# Patient Record
Sex: MALE | Race: WHITE | NOT HISPANIC OR LATINO | Employment: OTHER | URBAN - METROPOLITAN AREA
[De-identification: names, ages, dates, MRNs, and addresses within clinical notes are randomized per-mention and may not be internally consistent; named-entity substitution may affect disease eponyms.]

---

## 2023-04-11 ENCOUNTER — HOSPITAL ENCOUNTER (INPATIENT)
Facility: HOSPITAL | Age: 88
LOS: 7 days | Discharge: HOME | End: 2023-04-19
Attending: EMERGENCY MEDICINE | Admitting: INTERNAL MEDICINE
Payer: OTHER GOVERNMENT

## 2023-04-11 ENCOUNTER — APPOINTMENT (OUTPATIENT)
Dept: GENERAL RADIOLOGY | Facility: HOSPITAL | Age: 88
End: 2023-04-11
Payer: OTHER GOVERNMENT

## 2023-04-11 ENCOUNTER — APPOINTMENT (OUTPATIENT)
Dept: CT IMAGING | Facility: HOSPITAL | Age: 88
End: 2023-04-11
Payer: OTHER GOVERNMENT

## 2023-04-11 DIAGNOSIS — R53.1 GENERALIZED WEAKNESS: Primary | ICD-10-CM

## 2023-04-11 DIAGNOSIS — E87.1 HYPONATREMIA: ICD-10-CM

## 2023-04-11 DIAGNOSIS — R74.8 ELEVATED LIVER ENZYMES: ICD-10-CM

## 2023-04-11 DIAGNOSIS — R41.0 CONFUSION: ICD-10-CM

## 2023-04-11 DIAGNOSIS — K29.80 DUODENITIS: ICD-10-CM

## 2023-04-11 DIAGNOSIS — N17.9 ACUTE KIDNEY INJURY: ICD-10-CM

## 2023-04-11 DIAGNOSIS — N39.0 ACUTE UTI: ICD-10-CM

## 2023-04-11 PROBLEM — N20.0 LEFT NEPHROLITHIASIS: Status: ACTIVE | Noted: 2023-04-11

## 2023-04-11 PROBLEM — N40.0 BPH (BENIGN PROSTATIC HYPERPLASIA): Status: ACTIVE | Noted: 2023-04-11

## 2023-04-11 PROBLEM — R79.89 ELEVATED SERUM CREATININE: Status: ACTIVE | Noted: 2023-04-11

## 2023-04-11 PROBLEM — I10 HYPERTENSION: Status: ACTIVE | Noted: 2023-04-11

## 2023-04-11 PROBLEM — M10.9 GOUT: Status: ACTIVE | Noted: 2023-04-11

## 2023-04-11 LAB
ALBUMIN SERPL-MCNC: 3.3 G/DL (ref 3.5–5.2)
ALBUMIN/GLOB SERPL: 0.9 G/DL
ALP SERPL-CCNC: 198 U/L (ref 39–117)
ALT SERPL W P-5'-P-CCNC: 79 U/L (ref 1–41)
AMMONIA BLD-SCNC: 24 UMOL/L (ref 16–60)
ANION GAP SERPL CALCULATED.3IONS-SCNC: 15 MMOL/L (ref 5–15)
AST SERPL-CCNC: 106 U/L (ref 1–40)
BACTERIA UR QL AUTO: ABNORMAL /HPF
BASOPHILS # BLD AUTO: 0.04 10*3/MM3 (ref 0–0.2)
BASOPHILS NFR BLD AUTO: 0.3 % (ref 0–1.5)
BILIRUB SERPL-MCNC: 0.9 MG/DL (ref 0–1.2)
BILIRUB UR QL STRIP: NEGATIVE
BUN SERPL-MCNC: 59 MG/DL (ref 8–23)
BUN/CREAT SERPL: 29.6 (ref 7–25)
CALCIUM SPEC-SCNC: 8.9 MG/DL (ref 8.6–10.5)
CHLORIDE SERPL-SCNC: 96 MMOL/L (ref 98–107)
CLARITY UR: ABNORMAL
CO2 SERPL-SCNC: 19 MMOL/L (ref 22–29)
COLOR UR: YELLOW
CREAT SERPL-MCNC: 1.99 MG/DL (ref 0.76–1.27)
D-LACTATE SERPL-SCNC: 2 MMOL/L (ref 0.5–2)
DEPRECATED RDW RBC AUTO: 44.8 FL (ref 37–54)
EGFRCR SERPLBLD CKD-EPI 2021: 31.9 ML/MIN/1.73
EOSINOPHIL # BLD AUTO: 0.02 10*3/MM3 (ref 0–0.4)
EOSINOPHIL NFR BLD AUTO: 0.1 % (ref 0.3–6.2)
ERYTHROCYTE [DISTWIDTH] IN BLOOD BY AUTOMATED COUNT: 13.2 % (ref 12.3–15.4)
GLOBULIN UR ELPH-MCNC: 3.6 GM/DL
GLUCOSE SERPL-MCNC: 184 MG/DL (ref 65–99)
GLUCOSE UR STRIP-MCNC: NEGATIVE MG/DL
HAV IGM SERPL QL IA: NORMAL
HBV CORE IGM SERPL QL IA: NORMAL
HBV SURFACE AG SERPL QL IA: NORMAL
HCT VFR BLD AUTO: 43.4 % (ref 37.5–51)
HCV AB SER DONR QL: NORMAL
HGB BLD-MCNC: 15.1 G/DL (ref 13–17.7)
HGB UR QL STRIP.AUTO: ABNORMAL
HOLD SPECIMEN: NORMAL
HYALINE CASTS UR QL AUTO: ABNORMAL /LPF
IMM GRANULOCYTES # BLD AUTO: 0.08 10*3/MM3 (ref 0–0.05)
IMM GRANULOCYTES NFR BLD AUTO: 0.5 % (ref 0–0.5)
KETONES UR QL STRIP: NEGATIVE
LEUKOCYTE ESTERASE UR QL STRIP.AUTO: ABNORMAL
LYMPHOCYTES # BLD AUTO: 0.9 10*3/MM3 (ref 0.7–3.1)
LYMPHOCYTES NFR BLD AUTO: 5.9 % (ref 19.6–45.3)
MAGNESIUM SERPL-MCNC: 2.3 MG/DL (ref 1.6–2.4)
MCH RBC QN AUTO: 31.7 PG (ref 26.6–33)
MCHC RBC AUTO-ENTMCNC: 34.8 G/DL (ref 31.5–35.7)
MCV RBC AUTO: 91.2 FL (ref 79–97)
MONOCYTES # BLD AUTO: 1.82 10*3/MM3 (ref 0.1–0.9)
MONOCYTES NFR BLD AUTO: 11.9 % (ref 5–12)
NEUTROPHILS NFR BLD AUTO: 12.4 10*3/MM3 (ref 1.7–7)
NEUTROPHILS NFR BLD AUTO: 81.3 % (ref 42.7–76)
NITRITE UR QL STRIP: NEGATIVE
NRBC BLD AUTO-RTO: 0 /100 WBC (ref 0–0.2)
PH UR STRIP.AUTO: <=5 [PH] (ref 5–8)
PLATELET # BLD AUTO: 212 10*3/MM3 (ref 140–450)
PMV BLD AUTO: 10.4 FL (ref 6–12)
POTASSIUM SERPL-SCNC: 3.7 MMOL/L (ref 3.5–5.2)
PROCALCITONIN SERPL-MCNC: 3.61 NG/ML (ref 0–0.25)
PROT SERPL-MCNC: 6.9 G/DL (ref 6–8.5)
PROT UR QL STRIP: ABNORMAL
RBC # BLD AUTO: 4.76 10*6/MM3 (ref 4.14–5.8)
RBC # UR STRIP: ABNORMAL /HPF
REF LAB TEST METHOD: ABNORMAL
SODIUM SERPL-SCNC: 130 MMOL/L (ref 136–145)
SP GR UR STRIP: 1.02 (ref 1–1.03)
SQUAMOUS #/AREA URNS HPF: ABNORMAL /HPF
TROPONIN T SERPL HS-MCNC: 23 NG/L
UROBILINOGEN UR QL STRIP: ABNORMAL
WBC # UR STRIP: ABNORMAL /HPF
WBC NRBC COR # BLD: 15.26 10*3/MM3 (ref 3.4–10.8)
WHOLE BLOOD HOLD COAG: NORMAL
WHOLE BLOOD HOLD SPECIMEN: NORMAL

## 2023-04-11 PROCEDURE — 82140 ASSAY OF AMMONIA: CPT | Performed by: EMERGENCY MEDICINE

## 2023-04-11 PROCEDURE — 80053 COMPREHEN METABOLIC PANEL: CPT | Performed by: EMERGENCY MEDICINE

## 2023-04-11 PROCEDURE — 99223 1ST HOSP IP/OBS HIGH 75: CPT | Performed by: PHYSICIAN ASSISTANT

## 2023-04-11 PROCEDURE — 83735 ASSAY OF MAGNESIUM: CPT | Performed by: EMERGENCY MEDICINE

## 2023-04-11 PROCEDURE — 83605 ASSAY OF LACTIC ACID: CPT | Performed by: EMERGENCY MEDICINE

## 2023-04-11 PROCEDURE — 70450 CT HEAD/BRAIN W/O DYE: CPT

## 2023-04-11 PROCEDURE — 93005 ELECTROCARDIOGRAM TRACING: CPT | Performed by: EMERGENCY MEDICINE

## 2023-04-11 PROCEDURE — G0378 HOSPITAL OBSERVATION PER HR: HCPCS

## 2023-04-11 PROCEDURE — 25010000002 CEFTRIAXONE PER 250 MG: Performed by: EMERGENCY MEDICINE

## 2023-04-11 PROCEDURE — 87077 CULTURE AEROBIC IDENTIFY: CPT | Performed by: EMERGENCY MEDICINE

## 2023-04-11 PROCEDURE — 74176 CT ABD & PELVIS W/O CONTRAST: CPT

## 2023-04-11 PROCEDURE — 83036 HEMOGLOBIN GLYCOSYLATED A1C: CPT | Performed by: PHYSICIAN ASSISTANT

## 2023-04-11 PROCEDURE — 87086 URINE CULTURE/COLONY COUNT: CPT | Performed by: EMERGENCY MEDICINE

## 2023-04-11 PROCEDURE — 84145 PROCALCITONIN (PCT): CPT | Performed by: FAMILY MEDICINE

## 2023-04-11 PROCEDURE — 87186 SC STD MICRODIL/AGAR DIL: CPT | Performed by: EMERGENCY MEDICINE

## 2023-04-11 PROCEDURE — 87040 BLOOD CULTURE FOR BACTERIA: CPT | Performed by: EMERGENCY MEDICINE

## 2023-04-11 PROCEDURE — 86140 C-REACTIVE PROTEIN: CPT | Performed by: PHYSICIAN ASSISTANT

## 2023-04-11 PROCEDURE — 99285 EMERGENCY DEPT VISIT HI MDM: CPT

## 2023-04-11 PROCEDURE — 85025 COMPLETE CBC W/AUTO DIFF WBC: CPT | Performed by: EMERGENCY MEDICINE

## 2023-04-11 PROCEDURE — 71045 X-RAY EXAM CHEST 1 VIEW: CPT

## 2023-04-11 PROCEDURE — 93005 ELECTROCARDIOGRAM TRACING: CPT

## 2023-04-11 PROCEDURE — 84484 ASSAY OF TROPONIN QUANT: CPT | Performed by: EMERGENCY MEDICINE

## 2023-04-11 PROCEDURE — 87150 DNA/RNA AMPLIFIED PROBE: CPT | Performed by: EMERGENCY MEDICINE

## 2023-04-11 PROCEDURE — 81001 URINALYSIS AUTO W/SCOPE: CPT | Performed by: EMERGENCY MEDICINE

## 2023-04-11 PROCEDURE — 80074 ACUTE HEPATITIS PANEL: CPT | Performed by: PHYSICIAN ASSISTANT

## 2023-04-11 RX ORDER — SODIUM CHLORIDE 9 MG/ML
40 INJECTION, SOLUTION INTRAVENOUS AS NEEDED
Status: DISCONTINUED | OUTPATIENT
Start: 2023-04-11 | End: 2023-04-19 | Stop reason: HOSPADM

## 2023-04-11 RX ORDER — AMLODIPINE BESYLATE 10 MG/1
10 TABLET ORAL DAILY
COMMUNITY
End: 2023-04-19 | Stop reason: HOSPADM

## 2023-04-11 RX ORDER — TAMSULOSIN HYDROCHLORIDE 0.4 MG/1
0.4 CAPSULE ORAL DAILY
Status: DISCONTINUED | OUTPATIENT
Start: 2023-04-12 | End: 2023-04-19 | Stop reason: HOSPADM

## 2023-04-11 RX ORDER — HEPARIN SODIUM 5000 [USP'U]/ML
5000 INJECTION, SOLUTION INTRAVENOUS; SUBCUTANEOUS EVERY 12 HOURS SCHEDULED
Status: DISCONTINUED | OUTPATIENT
Start: 2023-04-12 | End: 2023-04-11

## 2023-04-11 RX ORDER — ONDANSETRON 2 MG/ML
4 INJECTION INTRAMUSCULAR; INTRAVENOUS EVERY 6 HOURS PRN
Status: DISCONTINUED | OUTPATIENT
Start: 2023-04-11 | End: 2023-04-19 | Stop reason: HOSPADM

## 2023-04-11 RX ORDER — LOSARTAN POTASSIUM 50 MG/1
100 TABLET ORAL DAILY
COMMUNITY

## 2023-04-11 RX ORDER — CHOLECALCIFEROL (VITAMIN D3) 125 MCG
5 CAPSULE ORAL NIGHTLY PRN
Status: DISCONTINUED | OUTPATIENT
Start: 2023-04-11 | End: 2023-04-19 | Stop reason: HOSPADM

## 2023-04-11 RX ORDER — SODIUM CHLORIDE 0.9 % (FLUSH) 0.9 %
10 SYRINGE (ML) INJECTION AS NEEDED
Status: DISCONTINUED | OUTPATIENT
Start: 2023-04-11 | End: 2023-04-19 | Stop reason: HOSPADM

## 2023-04-11 RX ORDER — SODIUM CHLORIDE, SODIUM LACTATE, POTASSIUM CHLORIDE, CALCIUM CHLORIDE 600; 310; 30; 20 MG/100ML; MG/100ML; MG/100ML; MG/100ML
75 INJECTION, SOLUTION INTRAVENOUS CONTINUOUS
Status: ACTIVE | OUTPATIENT
Start: 2023-04-11 | End: 2023-04-12

## 2023-04-11 RX ORDER — FLUTICASONE PROPIONATE 50 MCG
2 SPRAY, SUSPENSION (ML) NASAL DAILY
COMMUNITY

## 2023-04-11 RX ORDER — SODIUM CHLORIDE 0.9 % (FLUSH) 0.9 %
10 SYRINGE (ML) INJECTION EVERY 12 HOURS SCHEDULED
Status: DISCONTINUED | OUTPATIENT
Start: 2023-04-12 | End: 2023-04-19 | Stop reason: HOSPADM

## 2023-04-11 RX ORDER — AMLODIPINE BESYLATE 5 MG/1
5 TABLET ORAL DAILY
COMMUNITY

## 2023-04-11 RX ORDER — TAMSULOSIN HYDROCHLORIDE 0.4 MG/1
1 CAPSULE ORAL NIGHTLY
COMMUNITY

## 2023-04-11 RX ORDER — FINASTERIDE 5 MG/1
5 TABLET, FILM COATED ORAL DAILY
COMMUNITY

## 2023-04-11 RX ADMIN — SODIUM CHLORIDE 1 G: 900 INJECTION INTRAVENOUS at 22:57

## 2023-04-11 RX ADMIN — SODIUM CHLORIDE 500 ML: 9 INJECTION, SOLUTION INTRAVENOUS at 18:39

## 2023-04-11 NOTE — ED PROVIDER NOTES
Subjective   History of Present Illness  Patient is a pleasant 87-year-old male who presents with generalized weakness and mild confusion over the past 4 days.  Family was not aware of this but the patient also states that he is experienced dysuria and increased urinary frequency for the last 8 weeks.  The patient believes that he has a urinary tract infection.  The family is noted that he is typically very independent and self-sufficient.  Able to bathe and even drives the car.  Over the last 3 to 4 days the family had to help him with all of the activities that he normally can perform independently.  Other than the generalized weakness and, dysuria, and increased urinary frequency he denies other acute complaints.        Review of Systems   All other systems reviewed and are negative.      Past Medical History:   Diagnosis Date   • Bladder stone    • BPH (benign prostatic hyperplasia) 04/11/2023   • Gout 04/11/2023   • Hypertension 04/11/2023       No Known Allergies    Past Surgical History:   Procedure Laterality Date   • CYSTOSCOPY     • CYSTOSCOPY W/ URETERAL STENT PLACEMENT     • TONSILLECTOMY         Family History   Problem Relation Age of Onset   • COPD Mother    • Dementia Mother    • Alcohol abuse Father    • Cerebral aneurysm Father        Social History     Socioeconomic History   • Marital status:    Tobacco Use   • Smoking status: Former     Packs/day: 1.00     Years: 30.00     Pack years: 30.00     Types: Cigarettes           Objective   Physical Exam  Vitals and nursing note reviewed.   Constitutional:       General: He is not in acute distress.     Appearance: He is not ill-appearing.   HENT:      Head: Normocephalic.      Comments: Patient has a contusion and abrasion to his right forehead.  Bleeding is controlled.  Eyes:      Conjunctiva/sclera: Conjunctivae normal.      Pupils: Pupils are equal, round, and reactive to light.   Neck:      Thyroid: No thyromegaly.   Cardiovascular:       Rate and Rhythm: Normal rate and regular rhythm.      Heart sounds: Normal heart sounds. No murmur heard.    No friction rub. No gallop.   Pulmonary:      Effort: Pulmonary effort is normal. No respiratory distress.      Breath sounds: Normal breath sounds.   Abdominal:      General: Bowel sounds are normal.      Palpations: Abdomen is soft.      Tenderness: There is no abdominal tenderness.   Musculoskeletal:         General: Normal range of motion.      Cervical back: Normal range of motion and neck supple.   Lymphadenopathy:      Cervical: No cervical adenopathy.   Skin:     General: Skin is warm and dry.   Neurological:      Mental Status: He is alert and oriented to person, place, and time.   Psychiatric:         Behavior: Behavior normal.         Thought Content: Thought content normal.         Procedures           ED Course      Recent Results (from the past 24 hour(s))   ECG 12 Lead ED Triage Standing Order; Weak / Dizzy / AMS    Collection Time: 04/11/23  4:35 PM   Result Value Ref Range    QT Interval 318 ms    QTC Interval 403 ms   Comprehensive Metabolic Panel    Collection Time: 04/11/23  4:57 PM    Specimen: Blood   Result Value Ref Range    Glucose 184 (H) 65 - 99 mg/dL    BUN 59 (H) 8 - 23 mg/dL    Creatinine 1.99 (H) 0.76 - 1.27 mg/dL    Sodium 130 (L) 136 - 145 mmol/L    Potassium 3.7 3.5 - 5.2 mmol/L    Chloride 96 (L) 98 - 107 mmol/L    CO2 19.0 (L) 22.0 - 29.0 mmol/L    Calcium 8.9 8.6 - 10.5 mg/dL    Total Protein 6.9 6.0 - 8.5 g/dL    Albumin 3.3 (L) 3.5 - 5.2 g/dL    ALT (SGPT) 79 (H) 1 - 41 U/L    AST (SGOT) 106 (H) 1 - 40 U/L    Alkaline Phosphatase 198 (H) 39 - 117 U/L    Total Bilirubin 0.9 0.0 - 1.2 mg/dL    Globulin 3.6 gm/dL    A/G Ratio 0.9 g/dL    BUN/Creatinine Ratio 29.6 (H) 7.0 - 25.0    Anion Gap 15.0 5.0 - 15.0 mmol/L    eGFR 31.9 (L) >60.0 mL/min/1.73   Single High Sensitivity Troponin T    Collection Time: 04/11/23  4:57 PM    Specimen: Blood   Result Value Ref Range    HS  Troponin T 23 (H) <15 ng/L   Magnesium    Collection Time: 04/11/23  4:57 PM    Specimen: Blood   Result Value Ref Range    Magnesium 2.3 1.6 - 2.4 mg/dL   Green Top (Gel)    Collection Time: 04/11/23  4:57 PM   Result Value Ref Range    Extra Tube Hold for add-ons.    Lavender Top    Collection Time: 04/11/23  4:57 PM   Result Value Ref Range    Extra Tube hold for add-on    Gold Top - SST    Collection Time: 04/11/23  4:57 PM   Result Value Ref Range    Extra Tube Hold for add-ons.    Gray Top    Collection Time: 04/11/23  4:57 PM   Result Value Ref Range    Extra Tube Hold for add-ons.    Light Blue Top    Collection Time: 04/11/23  4:57 PM   Result Value Ref Range    Extra Tube Hold for add-ons.    CBC Auto Differential    Collection Time: 04/11/23  4:57 PM    Specimen: Blood   Result Value Ref Range    WBC 15.26 (H) 3.40 - 10.80 10*3/mm3    RBC 4.76 4.14 - 5.80 10*6/mm3    Hemoglobin 15.1 13.0 - 17.7 g/dL    Hematocrit 43.4 37.5 - 51.0 %    MCV 91.2 79.0 - 97.0 fL    MCH 31.7 26.6 - 33.0 pg    MCHC 34.8 31.5 - 35.7 g/dL    RDW 13.2 12.3 - 15.4 %    RDW-SD 44.8 37.0 - 54.0 fl    MPV 10.4 6.0 - 12.0 fL    Platelets 212 140 - 450 10*3/mm3    Neutrophil % 81.3 (H) 42.7 - 76.0 %    Lymphocyte % 5.9 (L) 19.6 - 45.3 %    Monocyte % 11.9 5.0 - 12.0 %    Eosinophil % 0.1 (L) 0.3 - 6.2 %    Basophil % 0.3 0.0 - 1.5 %    Immature Grans % 0.5 0.0 - 0.5 %    Neutrophils, Absolute 12.40 (H) 1.70 - 7.00 10*3/mm3    Lymphocytes, Absolute 0.90 0.70 - 3.10 10*3/mm3    Monocytes, Absolute 1.82 (H) 0.10 - 0.90 10*3/mm3    Eosinophils, Absolute 0.02 0.00 - 0.40 10*3/mm3    Basophils, Absolute 0.04 0.00 - 0.20 10*3/mm3    Immature Grans, Absolute 0.08 (H) 0.00 - 0.05 10*3/mm3    nRBC 0.0 0.0 - 0.2 /100 WBC   Ammonia    Collection Time: 04/11/23  4:57 PM    Specimen: Blood   Result Value Ref Range    Ammonia 24 16 - 60 umol/L   Procalcitonin    Collection Time: 04/11/23  4:57 PM    Specimen: Blood   Result Value Ref Range     Procalcitonin 3.61 (H) 0.00 - 0.25 ng/mL   Hepatitis Panel, Acute    Collection Time: 04/11/23  4:57 PM    Specimen: Blood   Result Value Ref Range    Hepatitis B Surface Ag Non-Reactive Non-Reactive    Hep A IgM Non-Reactive Non-Reactive    Hep B C IgM Non-Reactive Non-Reactive    Hepatitis C Ab Non-Reactive Non-Reactive   C-reactive Protein    Collection Time: 04/11/23  4:57 PM    Specimen: Blood   Result Value Ref Range    C-Reactive Protein 29.63 (H) 0.00 - 0.50 mg/dL   Hemoglobin A1c    Collection Time: 04/11/23  4:57 PM    Specimen: Blood   Result Value Ref Range    Hemoglobin A1C 6.90 (H) 4.80 - 5.60 %   Urinalysis With Microscopic If Indicated (No Culture) - Urine, Clean Catch    Collection Time: 04/11/23  6:41 PM    Specimen: Urine, Clean Catch   Result Value Ref Range    Color, UA Yellow Yellow, Straw    Appearance, UA Cloudy (A) Clear    pH, UA <=5.0 5.0 - 8.0    Specific Gravity, UA 1.016 1.001 - 1.030    Glucose, UA Negative Negative    Ketones, UA Negative Negative    Bilirubin, UA Negative Negative    Blood, UA Large (3+) (A) Negative    Protein,  mg/dL (2+) (A) Negative    Leuk Esterase, UA Moderate (2+) (A) Negative    Nitrite, UA Negative Negative    Urobilinogen, UA 1.0 E.U./dL 0.2 - 1.0 E.U./dL   Urinalysis, Microscopic Only - Urine, Clean Catch    Collection Time: 04/11/23  6:41 PM    Specimen: Urine, Clean Catch   Result Value Ref Range    RBC, UA 13-20 (A) None Seen, 0-2 /HPF    WBC, UA Too Numerous to Count (A) None Seen, 0-2 /HPF    Bacteria, UA 4+ (A) None Seen, Trace /HPF    Squamous Epithelial Cells, UA 0-2 None Seen, 0-2 /HPF    Hyaline Casts, UA 0-6 0 - 6 /LPF    Methodology Manual Light Microscopy    Urine Culture - Urine, Urine, Clean Catch    Collection Time: 04/11/23  6:41 PM    Specimen: Urine, Clean Catch   Result Value Ref Range    Urine Culture Growth present, too young to evaluate    Lactic Acid, Plasma    Collection Time: 04/11/23 10:30 PM    Specimen: Blood   Result  Value Ref Range    Lactate 2.0 0.5 - 2.0 mmol/L   Comprehensive Metabolic Panel    Collection Time: 04/12/23  3:24 AM    Specimen: Blood   Result Value Ref Range    Glucose 175 (H) 65 - 99 mg/dL    BUN 53 (H) 8 - 23 mg/dL    Creatinine 1.81 (H) 0.76 - 1.27 mg/dL    Sodium 133 (L) 136 - 145 mmol/L    Potassium 3.8 3.5 - 5.2 mmol/L    Chloride 100 98 - 107 mmol/L    CO2 20.0 (L) 22.0 - 29.0 mmol/L    Calcium 8.1 (L) 8.6 - 10.5 mg/dL    Total Protein 5.0 (L) 6.0 - 8.5 g/dL    Albumin 2.7 (L) 3.5 - 5.2 g/dL    ALT (SGPT) 67 (H) 1 - 41 U/L    AST (SGOT) 82 (H) 1 - 40 U/L    Alkaline Phosphatase 165 (H) 39 - 117 U/L    Total Bilirubin 0.7 0.0 - 1.2 mg/dL    Globulin 2.3 gm/dL    A/G Ratio 1.2 g/dL    BUN/Creatinine Ratio 29.3 (H) 7.0 - 25.0    Anion Gap 13.0 5.0 - 15.0 mmol/L    eGFR 35.7 (L) >60.0 mL/min/1.73   CBC (No Diff)    Collection Time: 04/12/23  3:24 AM    Specimen: Blood   Result Value Ref Range    WBC 13.88 (H) 3.40 - 10.80 10*3/mm3    RBC 4.20 4.14 - 5.80 10*6/mm3    Hemoglobin 13.3 13.0 - 17.7 g/dL    Hematocrit 38.7 37.5 - 51.0 %    MCV 92.1 79.0 - 97.0 fL    MCH 31.7 26.6 - 33.0 pg    MCHC 34.4 31.5 - 35.7 g/dL    RDW 13.3 12.3 - 15.4 %    RDW-SD 45.1 37.0 - 54.0 fl    MPV 11.1 6.0 - 12.0 fL    Platelets 191 140 - 450 10*3/mm3   Sedimentation Rate    Collection Time: 04/12/23  3:24 AM    Specimen: Blood   Result Value Ref Range    Sed Rate 75 (H) 0 - 20 mm/hr   Lipase    Collection Time: 04/12/23  3:24 AM    Specimen: Blood   Result Value Ref Range    Lipase 24 13 - 60 U/L     Note: In addition to lab results from this visit, the labs listed above may include labs taken at another facility or during a different encounter within the last 24 hours. Please correlate lab times with ED admission and discharge times for further clarification of the services performed during this visit.    CT Abdomen Pelvis With Contrast   Final Result   Impression:   1. Obstructing calculus in mid left ureter resolved. Mild left  hydronephrosis and moderate left proximal ureter dilatation slightly improved. Bilateral nonobstructing nephrolithiasis with large calculus in bladder.   2. Inflammatory stranding surrounding second and third portion duodenum consistent with duodenitis.   3. 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   4. Stable small left pleural effusion.   5. Cholelithiasis, prostatomegaly, and chronic findings above.      Electronically Signed: Gurinder Templetonluis     4/12/2023 2:30 PM EDT     Workstation ID: [id number]      CT Abdomen Pelvis Without Contrast   Final Result   Impression:   Bilateral nephrolithiasis is present, with an obstructing stone present in the midportion of the left ureter, with associated left-sided hydronephrosis and nephropathy.      There is severe wall edema with adjacent mesenteric stranding present involving the transverse portion of the duodenum, incompletely characterized given lack of IV contrast, with appearance concerning for an adjacent inflamed large duodenal diverticulum,    with possible small adjacent abscess suspected, measuring approximately 3 cm on image 56 of the axial series. There is no evidence of associated bowel obstruction. Consider contrast-enhanced CT to further evaluate.         Electronically Signed: Myron Vyas     4/11/2023 11:08 PM EDT     Workstation ID: [id number]      CT Head Without Contrast Stroke Protocol   Final Result   Age-related changes of the brain as above, otherwise without evidence of acute intracranial abnormality.          Electronically Signed: Myron Vyas     4/11/2023 10:56 PM EDT     Workstation ID: [id number]      XR Chest 1 View   Final Result   Mild chronic changes of the lung fields without evidence of acute cardiopulmonary abnormality.       Electronically Signed: Myron Vyas     4/11/2023 6:10 PM EDT     Workstation ID: [id number]         Vitals:    04/12/23 0700 04/12/23 0823 04/12/23 1100 04/12/23 1427   BP: 126/60  132/61 137/65   BP Location: Right arm  Right arm Right arm   Patient Position: Lying  Lying Lying   Pulse: 79  77 67   Resp: 16  16 18   Temp: 100.5 °F (38.1 °C) 99.4 °F (37.4 °C) 98.1 °F (36.7 °C) 98.1 °F (36.7 °C)   TempSrc: Axillary Oral Oral Oral   SpO2: 93%  94% 94%   Weight:       Height:         Medications   sodium chloride 0.9 % flush 10 mL (has no administration in time range)   sodium chloride 0.9 % flush 10 mL (10 mL Intravenous Not Given 4/12/23 0819)   sodium chloride 0.9 % flush 10 mL (has no administration in time range)   sodium chloride 0.9 % infusion 40 mL (has no administration in time range)   melatonin tablet 5 mg (has no administration in time range)   ondansetron (ZOFRAN) injection 4 mg (has no administration in time range)   lactated ringers infusion (75 mL/hr Intravenous New Bag 4/12/23 0016)   tamsulosin (FLOMAX) 24 hr capsule 0.4 mg (0.4 mg Oral Given 4/12/23 0016)   piperacillin-tazobactam (ZOSYN) 3.375 g in iso-osmotic dextrose 50 ml (premix) (3.375 g Intravenous New Bag 4/12/23 1448)   sodium chloride 0.9 % bolus 500 mL (0 mL Intravenous Stopped 4/11/23 1900)   piperacillin-tazobactam (ZOSYN) 3.375 g in iso-osmotic dextrose 50 ml (premix) (3.375 g Intravenous New Bag 4/12/23 0818)   iopamidol (ISOVUE-300) 61 % injection 100 mL (95 mL Intravenous Given 4/12/23 1330)     ECG/EMG Results (last 24 hours)     Procedure Component Value Units Date/Time    ECG 12 Lead ED Triage Standing Order; Weak / Dizzy / AMS [[id number]] Collected: 04/11/23 1635     Updated: 04/12/23 0734     QT Interval 318 ms      QTC Interval 403 ms     Narrative:      Test Reason : ED Triage Standing Order~  Blood Pressure :   */*   mmHG  Vent. Rate :  97 BPM     Atrial Rate :  97 BPM     P-R Int : 196 ms          QRS Dur :  90 ms      QT Int : 318 ms       P-R-T Axes :  47 -45  50 degrees     QTc Int : 403 ms    Sinus rhythm with  premature atrial complexes  Left axis deviation  Inferior infarct , age undetermined  Abnormal ECG  No previous ECGs available    Referred By: EDMD           Confirmed By:         ECG 12 Lead ED Triage Standing Order; Weak / Dizzy / AMS   Preliminary Result   Test Reason : ED Triage Standing Order~   Blood Pressure :   */*   mmHG   Vent. Rate :  97 BPM     Atrial Rate :  97 BPM      P-R Int : 196 ms          QRS Dur :  90 ms       QT Int : 318 ms       P-R-T Axes :  47 -45  50 degrees      QTc Int : 403 ms      Sinus rhythm with premature atrial complexes   Left axis deviation   Inferior infarct , age undetermined   Abnormal ECG   No previous ECGs available      Referred By: EDMD           Confirmed By:                                                Medical Decision Making  Wide differential considered.  I suspect his weakness secondary to UTI.  Given his inability to care for himself and his fairly profound weakness and also mental status changes I think he would benefit from a IV antibiotics at this time and therapy evaluation.  I discussed this with the family and they are also most comfortable with this, along with the patient.  I discussed this with internal medicine attending who will admit for further evaluation and management.    Acute kidney injury: complicated acute illness or injury  Acute UTI: complicated acute illness or injury  Confusion: complicated acute illness or injury  Elevated liver enzymes: complicated acute illness or injury  Generalized weakness: complicated acute illness or injury  Hyponatremia: complicated acute illness or injury  Amount and/or Complexity of Data Reviewed  External Data Reviewed: notes.  Labs: ordered. Decision-making details documented in ED Course.  Radiology: ordered and independent interpretation performed. Decision-making details documented in ED Course.  ECG/medicine tests: ordered and independent interpretation performed. Decision-making details documented in ED  Course.  Discussion of management or test interpretation with external provider(s): Case discussed with internal medicine attending who will admit for further evaluation and management.    Risk  Prescription drug management.  Decision regarding hospitalization.          Final diagnoses:   Generalized weakness   Acute kidney injury   Hyponatremia   Confusion   Acute UTI   Elevated liver enzymes       ED Disposition  ED Disposition     ED Disposition   Decision to Admit    Condition   --    Comment   Level of Care: Telemetry [5]   Diagnosis: Generalized weakness [388784]   Admitting Physician: LATRELL DA SILVA [[id number]]   Attending Physician: LATRELL DA SILVA [[id number]]   Bed Request Comments: Ozzy Ralph DO  04/12/23 145

## 2023-04-12 ENCOUNTER — APPOINTMENT (OUTPATIENT)
Dept: CT IMAGING | Facility: HOSPITAL | Age: 88
End: 2023-04-12
Payer: OTHER GOVERNMENT

## 2023-04-12 LAB
ALBUMIN SERPL-MCNC: 2.7 G/DL (ref 3.5–5.2)
ALBUMIN/GLOB SERPL: 1.2 G/DL
ALP SERPL-CCNC: 165 U/L (ref 39–117)
ALT SERPL W P-5'-P-CCNC: 67 U/L (ref 1–41)
ANION GAP SERPL CALCULATED.3IONS-SCNC: 13 MMOL/L (ref 5–15)
AST SERPL-CCNC: 82 U/L (ref 1–40)
BILIRUB SERPL-MCNC: 0.7 MG/DL (ref 0–1.2)
BUN SERPL-MCNC: 53 MG/DL (ref 8–23)
BUN/CREAT SERPL: 29.3 (ref 7–25)
CALCIUM SPEC-SCNC: 8.1 MG/DL (ref 8.6–10.5)
CHLORIDE SERPL-SCNC: 100 MMOL/L (ref 98–107)
CO2 SERPL-SCNC: 20 MMOL/L (ref 22–29)
CREAT SERPL-MCNC: 1.81 MG/DL (ref 0.76–1.27)
CRP SERPL-MCNC: 29.63 MG/DL (ref 0–0.5)
DEPRECATED RDW RBC AUTO: 45.1 FL (ref 37–54)
EGFRCR SERPLBLD CKD-EPI 2021: 35.7 ML/MIN/1.73
ERYTHROCYTE [DISTWIDTH] IN BLOOD BY AUTOMATED COUNT: 13.3 % (ref 12.3–15.4)
ERYTHROCYTE [SEDIMENTATION RATE] IN BLOOD: 75 MM/HR (ref 0–20)
GLOBULIN UR ELPH-MCNC: 2.3 GM/DL
GLUCOSE SERPL-MCNC: 175 MG/DL (ref 65–99)
HBA1C MFR BLD: 6.9 % (ref 4.8–5.6)
HCT VFR BLD AUTO: 38.7 % (ref 37.5–51)
HGB BLD-MCNC: 13.3 G/DL (ref 13–17.7)
LIPASE SERPL-CCNC: 24 U/L (ref 13–60)
MCH RBC QN AUTO: 31.7 PG (ref 26.6–33)
MCHC RBC AUTO-ENTMCNC: 34.4 G/DL (ref 31.5–35.7)
MCV RBC AUTO: 92.1 FL (ref 79–97)
PLATELET # BLD AUTO: 191 10*3/MM3 (ref 140–450)
PMV BLD AUTO: 11.1 FL (ref 6–12)
POTASSIUM SERPL-SCNC: 3.8 MMOL/L (ref 3.5–5.2)
PROT SERPL-MCNC: 5 G/DL (ref 6–8.5)
RBC # BLD AUTO: 4.2 10*6/MM3 (ref 4.14–5.8)
SODIUM SERPL-SCNC: 133 MMOL/L (ref 136–145)
WBC NRBC COR # BLD: 13.88 10*3/MM3 (ref 3.4–10.8)

## 2023-04-12 PROCEDURE — 25510000001 IOPAMIDOL 61 % SOLUTION: Performed by: HOSPITALIST

## 2023-04-12 PROCEDURE — 74177 CT ABD & PELVIS W/CONTRAST: CPT

## 2023-04-12 PROCEDURE — 99232 SBSQ HOSP IP/OBS MODERATE 35: CPT | Performed by: HOSPITALIST

## 2023-04-12 PROCEDURE — 97116 GAIT TRAINING THERAPY: CPT

## 2023-04-12 PROCEDURE — 85027 COMPLETE CBC AUTOMATED: CPT | Performed by: PHYSICIAN ASSISTANT

## 2023-04-12 PROCEDURE — 25010000002 PIPERACILLIN SOD-TAZOBACTAM PER 1 G: Performed by: FAMILY MEDICINE

## 2023-04-12 PROCEDURE — 83690 ASSAY OF LIPASE: CPT | Performed by: STUDENT IN AN ORGANIZED HEALTH CARE EDUCATION/TRAINING PROGRAM

## 2023-04-12 PROCEDURE — 85652 RBC SED RATE AUTOMATED: CPT | Performed by: PHYSICIAN ASSISTANT

## 2023-04-12 PROCEDURE — 97162 PT EVAL MOD COMPLEX 30 MIN: CPT

## 2023-04-12 PROCEDURE — 97165 OT EVAL LOW COMPLEX 30 MIN: CPT

## 2023-04-12 PROCEDURE — 80053 COMPREHEN METABOLIC PANEL: CPT | Performed by: PHYSICIAN ASSISTANT

## 2023-04-12 RX ORDER — HYDROCHLOROTHIAZIDE 12.5 MG/1
12.5 CAPSULE, GELATIN COATED ORAL
COMMUNITY
End: 2023-04-19 | Stop reason: HOSPADM

## 2023-04-12 RX ORDER — COLCHICINE 0.6 MG/1
0.6 TABLET ORAL
COMMUNITY

## 2023-04-12 RX ADMIN — TAZOBACTAM SODIUM AND PIPERACILLIN SODIUM 3.38 G: 375; 3 INJECTION, SOLUTION INTRAVENOUS at 14:48

## 2023-04-12 RX ADMIN — SODIUM CHLORIDE, POTASSIUM CHLORIDE, SODIUM LACTATE AND CALCIUM CHLORIDE 75 ML/HR: 600; 310; 30; 20 INJECTION, SOLUTION INTRAVENOUS at 00:16

## 2023-04-12 RX ADMIN — TAZOBACTAM SODIUM AND PIPERACILLIN SODIUM 3.38 G: 375; 3 INJECTION, SOLUTION INTRAVENOUS at 08:18

## 2023-04-12 RX ADMIN — TAMSULOSIN HYDROCHLORIDE 0.4 MG: 0.4 CAPSULE ORAL at 00:16

## 2023-04-12 RX ADMIN — IOPAMIDOL 95 ML: 612 INJECTION, SOLUTION INTRAVENOUS at 13:30

## 2023-04-12 RX ADMIN — TAZOBACTAM SODIUM AND PIPERACILLIN SODIUM 3.38 G: 375; 3 INJECTION, SOLUTION INTRAVENOUS at 23:55

## 2023-04-12 NOTE — PROGRESS NOTES
[facility] Medicine Services  PROGRESS NOTE    Patient Name: Abdiaziz Enrique Jr.  : 1935  MRN: [id number]    Date of Admission: 2023  Primary Care Physician: System, Provider Not In    Subjective   Subjective     CC:   Generalized Weakness    HPI:   Daughter in room today.  [facility] and he says he was in the [employer].      ROS:    Gen - No fevers, chills  CV - No chest pain, palpitations  Resp - No cough, dyspnea  GI - No N/V/D, abd pain    Objective   Objective     Vital Signs:   Temp:  [97.7 °F (36.5 °C)-100.5 °F (38.1 °C)] 97.7 °F (36.5 °C)  Heart Rate:  [] 78  Resp:  [14-26] 18  BP: (125-164)/(60-83) 148/66     Physical Exam:    Constitutional:  awake  HENT: NCAT, no JVD  Respiratory: Clear to auscultation bilaterally, respiratory effort normal   Cardiovascular: RRR, s1 and s2  Gastrointestinal: Positive bowel sounds, soft, nontender, nondistended  Musculoskeletal: No bilateral ankle edema  Psychiatric: Appropriate affect, cooperative  Neurologic: Oriented x 3, generalized weakness, speech clear  Skin: No rashes    Results Reviewed:  LAB RESULTS:      Lab 23  2230 23  1657   WBC 13.88*  --  15.26*   HEMOGLOBIN 13.3  --  15.1   HEMATOCRIT 38.7  --  43.4   PLATELETS 191  --  212   NEUTROS ABS  --   --  12.40*   IMMATURE GRANS (ABS)  --   --  0.08*   LYMPHS ABS  --   --  0.90   MONOS ABS  --   --  1.82*   EOS ABS  --   --  0.02   MCV 92.1  --  91.2   SED RATE 75*  --   --    CRP  --   --  29.63*   PROCALCITONIN  --   --  3.61*   LACTATE  --  2.0  --          Lab 23  03223  1657   SODIUM 133* 130*   POTASSIUM 3.8 3.7   CHLORIDE 100 96*   CO2 20.0* 19.0*   ANION GAP 13.0 15.0   BUN 53* 59*   CREATININE 1.81* 1.99*   EGFR 35.7* 31.9*   GLUCOSE 175* 184*   CALCIUM 8.1* 8.9   MAGNESIUM  --  2.3   HEMOGLOBIN A1C  --  6.90*         Lab 23  0324 23  1657   TOTAL PROTEIN 5.0* 6.9   ALBUMIN 2.7* 3.3*   GLOBULIN  2.3 3.6   ALT (SGPT) 67* 79*   AST (SGOT) 82* 106*   BILIRUBIN 0.7 0.9   ALK PHOS 165* 198*   LIPASE 24  --          Lab 04/11/23  1657   HSTROP T 23*                 Brief Urine Lab Results  (Last result in the past 365 days)      Color   Clarity   Blood   Leuk Est   Nitrite   Protein   CREAT   Urine HCG        04/11/23 1841 Yellow   Cloudy   Large (3+)   Moderate (2+)   Negative   100 mg/dL (2+)                 Microbiology Results Abnormal     Procedure Component Value - Date/Time    Urine Culture - Urine, Urine, Clean Catch [[id number]]  (Normal) Collected: 04/11/23 1841    Lab Status: Preliminary result Specimen: Urine, Clean Catch Updated: 04/12/23 0934     Urine Culture Growth present, too young to evaluate          CT Abdomen Pelvis Without Contrast    Result Date: 4/11/2023  CT ABDOMEN PELVIS WO CONTRAST Date of Exam: 4/11/2023 10:34 PM EDT Indication: new UTI in male , microscopic hematuria. Comparison: None available. Technique: Axial CT images were obtained of the abdomen and pelvis without the administration of contrast. Reconstructed coronal and sagittal images were also obtained. Automated exposure control and iterative construction methods were used. Findings: The lung bases are clear. Evaluation of the body wall soft tissues demonstrates no acute or suspicious findings. There is advanced multilevel spondylosis and lumbar dextrocurvature. The liver is homogeneous. Cholelithiasis is present with the gallbladder  otherwise nondistended. Homogeneous spleen. No suspicious focal pancreatic lesion. There is extensive bilateral nephrolithiasis, without evidence of obstruction on the right. On the left, there is moderate to severe hydronephrosis and a rounded 6 mm stone is present within the mid portion of the left ureter. A large stone is noted in the bladder. There is severe wall edema with adjacent mesenteric stranding present involving the transverse portion of the duodenum, incompletely characterized  given lack of IV contrast, with appearance concerning for an adjacent inflamed large duodenal diverticulum, with component of small adjacent abscess suspected, measuring approximately 3 cm on image 56 of the axial series. There is no evidence of associated bowel obstruction. There is no overt pneumoperitoneum. Atherosclerotic abdominal aorta. The pelvic viscera demonstrate no additional acute findings.     Impression: Impression: Bilateral nephrolithiasis is present, with an obstructing stone present in the midportion of the left ureter, with associated left-sided hydronephrosis and nephropathy. There is severe wall edema with adjacent mesenteric stranding present involving the transverse portion of the duodenum, incompletely characterized given lack of IV contrast, with appearance concerning for an adjacent inflamed large duodenal diverticulum,  with possible small adjacent abscess suspected, measuring approximately 3 cm on image 56 of the axial series. There is no evidence of associated bowel obstruction. Consider contrast-enhanced CT to further evaluate. Electronically Signed: Myron Vays  4/11/2023 11:08 PM EDT  Workstation ID: [id number]    CT Abdomen Pelvis With Contrast    Result Date: 4/12/2023  CT ABDOMEN PELVIS W CONTRAST Date of Exam: 4/12/2023 1:26 PM EDT Indication: Abdominal pain, acute, nonlocalized, recent abdominal CT without contrast demonstrating possible diverticular abscess associated with the duodenum  Comparison: CT abdomen and pelvis without contrast 4/11/2023 Technique: Axial CT images were obtained of the abdomen and pelvis following the uneventful intravenous administration of 95 mL Isovue 300. Reconstructed coronal and sagittal images were also obtained. Automated exposure control and iterative construction methods were used. Findings: Lung bases without consolidation. The visualized pulmonary arteries are well-opacified with contrast. Heart size normal. Small left pleural effusion stable  from prior study with minimal left basilar atelectasis. The liver is normal in size and contour. Gallbladder present with cholelithiasis noted. No pericholecystic inflammation. The adrenal glands are normal. The spleen is normal in size. Scattered areas of fatty replacement of pancreatic parenchyma. At the pancreatic head there is a low-attenuation cystic lesion measuring 3.6 x 2.2 cm. No upstream main pancreatic duct dilatation. Normal caliber common bile duct. Kidneys are symmetric in size. Previously seen 6 mm obstructing calculus in mid left ureter has resolved. There is mild left urothelial hyperemia and thickening at this level the ureter which relate to inflammation. There are 2 nonobstructing calculi at the left kidney measuring 7 mm and 8 mm which are stable. Mild left hydronephrosis and moderate left renal pelvic dilatation and proximal ureteral dilatation slightly decreased from prior study. Multiple nonobstructing right-sided renal calculi noted for  example at the lower pole measuring 16 mm and mid left kidney measuring 14 mm. Duplicated right renal collecting system. No right ureteral calculus. Large calculus dependently in bladder measures 17 mm, unchanged. Prostatomegaly. Negative for pneumoperitoneum. No bowel obstruction. The appendix is normal. There is hyperemia and inflammatory stranding surrounding the second and third portions of the duodenum suggesting duodenitis. No intramural abscess. The portal vein, splenic vein, superior mesenteric vein are patent. Moderate vascular calcifications of the abdominal aorta with ectasia of infrarenal aorta measuring up to 2.8 x 2.4 cm. Moderate convex right dextrocurvature of the lumbar spine centered at L3. No aggressive osseous lesion or fracture. 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     Impression: Impression: 1. Obstructing calculus in mid left ureter resolved. Mild left hydronephrosis and moderate left proximal  ureter dilatation slightly improved. Bilateral nonobstructing nephrolithiasis with large calculus in bladder. 2. Inflammatory stranding surrounding second and third portion duodenum consistent with duodenitis. 3. 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 4. Stable small left pleural effusion. 5. Cholelithiasis, prostatomegaly, and chronic findings above. Electronically Signed: Gurinder Jeffers  4/12/2023 2:30 PM EDT  Workstation ID: [id number]    XR Chest 1 View    Result Date: 4/11/2023  XR CHEST 1 VW Date of Exam: 4/11/2023 5:22 PM EDT Indication: Weak/Dizzy/AMS triage protocol. Comparison: None available. FINDINGS: Mild chronic changes of the lung fields are present without focal airspace opacity. There is no significant pleural effusion or distinct pneumothorax. Normal heart and mediastinal contours.     Impression: Mild chronic changes of the lung fields without evidence of acute cardiopulmonary abnormality. Electronically Signed: Myron Vyas  4/11/2023 6:10 PM EDT  Workstation ID: [id number]    CT Head Without Contrast Stroke Protocol    Result Date: 4/11/2023  CT HEAD WO CONTRAST STROKE PROTOCOL Date of Exam: 4/11/2023 10:31 PM EDT Indication: aMS, ATAXIA. Comparison: None available. Technique: Axial CT images were obtained of the head without contrast administration.  Reconstructed coronal and sagittal images were also obtained. Automated exposure control and iterative construction methods were used. FINDINGS: Gray-white differentiation is maintained and there is no evidence of intracranial hemorrhage, mass or mass effect. Age-related changes of the brain are present including volume loss and typical periventricular sequela of chronic small vessel ischemia. There is otherwise no evidence of intracranial hemorrhage, mass or mass effect. The ventricles are normal in size and configuration  accounting for surrounding volume loss. The orbits are normal and the paranasal sinuses are grossly clear.     Impression: Age-related changes of the brain as above, otherwise without evidence of acute intracranial abnormality.  Electronically Signed: Myron Vyas  4/11/2023 10:56 PM EDT  Workstation ID: [id number]          Current medications:  Scheduled Meds:piperacillin-tazobactam, 3.375 g, Intravenous, Q8H  sodium chloride, 10 mL, Intravenous, Q12H  tamsulosin, 0.4 mg, Oral, Daily      Continuous Infusions:   PRN Meds:.•  melatonin  •  ondansetron  •  sodium chloride  •  sodium chloride  •  sodium chloride    Assessment & Plan   Assessment & Plan     Active Hospital Problems    Diagnosis  POA   • **Generalized weakness [R53.1]  Yes   • Acute UTI [N39.0]  Yes   • Elevated serum creatinine [R79.89]  Yes   • Elevated liver enzymes [R74.8]  Yes   • Hypertension [I10]  Yes   • Gout [M10.9]  Yes   • BPH (benign prostatic hyperplasia) [N40.0]  Yes   • Left obsructing nephrolithiasis [N20.0]  Yes      Resolved Hospital Problems   No resolved problems to display.        Brief Hospital Course to date:  Abdiaziz Enrique Jr. is a 87 y.o. male with history of BPH, hypertension, bladder stone, gout who presented to the ER with Generalized Weakness and Mild Confusion.    ER reported Urinary Symptoms for the last 8 weeks     Confusion  Generalized Weakness  Acute UTI  Obstructing stone  Concerning for Diverticular abscess   - AMS likely secondary to infectious process  - CT head negative  - PT/OT  - UA positive for acute infection, hematuria, TNTC WBCs  - complicated by obstructing left kidney stone with hydronephrosis  - history of BPH  - blood and urine cultures pending  - IV fluids  - Changed to Zosyn to cover UTI and GI issues  - tamsulosin 0.4 mg daily  - Urology Consultation     Abnormal CT abdomen  Possible Duodenal Abscess  - imaging without constrast concerning for diverticular abscess, suggests repeat scan WITH  CONTRAST to further access intraabdominal process.  - check procalcitonin, lactic acid, inflammatory markers  - continue with Zosyn per above  - General Surgery Evaluation tomorrow ? abscess     Elevated Liver Enzymes  - acute hepatitis panel  - CT A/P  - consider US gallbladder     Elevated Serum Creatinine  - likely post renal related to obstruction  - baseline unknown  - GFR stable at 59  - strict I&O  - avoid nephrotoxins  - continue with IV fluids      HTN  - on HCTZ  - holding due to DAE     Hyperuricemia   - previously on colchicine. Check uric acid level   - avoid restarting HCTZ due to DAE and hyperuricemia     Expected Discharge Location and Transportation:  home  Expected Discharge   Expected Discharge Date and Time     Expected Discharge Date Expected Discharge Time    Apr 19, 2023            DVT prophylaxis:  Mechanical DVT prophylaxis orders are present.     AM-PAC 6 Clicks Score (PT): 18 (04/12/23 0800)    CODE STATUS:   Code Status and Medical Interventions:   Ordered at: 04/11/23 2746     Level Of Support Discussed With:    Patient     Code Status (Patient has no pulse and is not breathing):    CPR (Attempt to Resuscitate)     Medical Interventions (Patient has pulse or is breathing):    Full Support       Rashaad Santiago MD  04/12/23

## 2023-04-12 NOTE — PLAN OF CARE
Goal Outcome Evaluation:  Plan of Care Reviewed With: patient, daughter        Progress: improving  Outcome Evaluation: Patient ambulated 200 feet with CGA and L HHA, demonstrating occasional mild unsteadiness, limited by fatigue. Patient currently below baseline, demonstrating decreased functional mobility status, impaired balance, and decreased strength. Will address these deficits to promote return to PLOF. Recommend d/c home wt 24/7 assist from family and outpatient PT.

## 2023-04-12 NOTE — PLAN OF CARE
Goal Outcome Evaluation:  Plan of Care Reviewed With: patient, daughter        Progress: improving  Outcome Evaluation: OT eval complete. Pt presents below baseline with decreased balance and activity tolerance. Claude for bed mobility and CGA for transfers. MinAx1 for all functional mobility due to noted LOB. Independent with LBD. Recommend d/c home with 24/7 assist and OP servives.

## 2023-04-12 NOTE — CONSULTS
Colorectal surgery was consulted due to CT findings of diverticulum and stranding versus abscess.  Upon further review of the report and evaluating the CT scan personally, this process is located in the third portion of the duodenum.    I believe this would be best served by the expertise of general surgery.  I have communicated this with internal medicine as well as the patient.    Please feel free to call back with any questions or concerns.

## 2023-04-12 NOTE — CASE MANAGEMENT/SOCIAL WORK
Discharge Planning Assessment  [facility]     Patient Name: Abdiaziz Enrique Jr.  MRN: [id number]  Today's Date: 4/12/2023    Admit Date: 4/11/2023    Plan: Home   Discharge Needs Assessment     Row Name 04/12/23 1600       Living Environment    People in Home alone    Current Living Arrangements home    Primary Care Provided by self    Provides Primary Care For no one    Family Caregiver if Needed child(josse), adult    Able to Return to Prior Arrangements yes       Transition Planning    Patient/Family Anticipates Transition to home    Transportation Anticipated family or friend will provide       Discharge Needs Assessment    Readmission Within the Last 30 Days no previous admission in last 30 days    Equipment Currently Used at Home none    Current Discharge Risk lives alone               Discharge Plan     Row Name 04/12/23 1600       Plan    Plan Home    Patient/Family in Agreement with Plan yes    Plan Comments I met with Mr. Enrique at the bedside. He lives alone in [facility]. He is independent with mobility and activities of daily living. He drives himself when leaving the home and is not current with any home or outpatient services. He does have a cleaning lady that comes once a week. He has no medical equipment at home and denies any difficulty affording his medications. Mr. Enrique will go home at the time of discharge and will be transported home by his family via private vehicle. No discharge needs identified. Case management will continue to follow.    Final Discharge Disposition Code 01 - home or self-care              Continued Care and Services - Admitted Since 4/11/2023    Coordination has not been started for this encounter.       Expected Discharge Date and Time     Expected Discharge Date Expected Discharge Time    Apr 19, 2023          Demographic Summary     Row Name 04/12/23 1559       General Information    General Information Comments [facility] and [facility]               Functional Status     Row Name 04/12/23 1600       Functional Status, IADL    Medications independent    Meal Preparation independent    Housekeeping independent    Laundry independent    Shopping independent    IADL Comments Son lives next door.       Employment/    Employment Status retired    Current or Previous Occupation                Psychosocial    No documentation.                Abuse/Neglect    No documentation.                Legal    No documentation.                Substance Abuse    No documentation.                Patient Forms    No documentation.                   J Luis Tate RN

## 2023-04-12 NOTE — H&P
"    [facility] Medicine Services  HISTORY AND PHYSICAL    Patient Name: Abdiaziz Enrique Jr.  : 1935  MRN: [id number]  Primary Care Physician: System, Provider Not In  Date of admission: 2023    Subjective   Subjective     Chief Complaint:  Generalized weakness    HPI:  Abdiaziz Enrique Jr. is an 87 y.o. male with a past medical history significant for hypertension, BPH, gout, kidney stones and bladder stone. He presents today with generalized weakness and confusion. Last known well 3 days ago. Daughter at bedside volunteers that patient lives at home alone. Cares for self independently and still drives. States brother called her concerned that patient seemed increasingly confused and lethargic. Patient himself admitted to \"not feeling well\", poor PO intake, and sleeping most of the day. Also reports dark, blood tinged urine with dysuria for 2 months plus dizziness and recurrent mechanical falls. He fell 3 days ago and hit his head, sustained a left elbow abrasion. Fall was unwitnessed. Denies LOC. He is not anticoagulated. Family was concerned and brought the patient in for further evaluation and treatment.  Currently there are no complaints of fever, cough, congestion, SOB, or chest pain. No focal weakness/parathesias. No headache. No abdominal pain or N/V/D. Now new medications. Denies alcohol or substance use. Of note, patient reports long standing history of kidney stones requiring stent placement in the past. He had some bladder sones a few years ago but refused intervention at that time. Will admit to inpatient.      Review of Systems   Constitutional: Positive for fatigue. Negative for chills and fever.   HENT: Negative for congestion and trouble swallowing.    Eyes: Negative for photophobia and visual disturbance.   Respiratory: Negative for cough and shortness of breath.    Cardiovascular: Positive for chest pain (pt reprots one episode of chest pain 4 weeks ago, none " since. ). Negative for leg swelling.   Gastrointestinal: Negative for abdominal pain, diarrhea, nausea and vomiting.   Endocrine: Negative for cold intolerance and heat intolerance.   Genitourinary: Negative for dysuria and flank pain.   Musculoskeletal: Positive for back pain and gait problem (frequent falls ).   Skin: Positive for wound. Negative for pallor.   Allergic/Immunologic: Positive for immunocompromised state.   Neurological: Positive for dizziness, weakness and light-headedness. Negative for headaches.   Hematological: Negative for adenopathy.   Psychiatric/Behavioral: Positive for confusion. Negative for agitation.            Personal History     Past Medical History:   Diagnosis Date   • Bladder stone    • BPH (benign prostatic hyperplasia) 04/11/2023   • Gout 04/11/2023   • Hypertension 04/11/2023             Past Surgical History:   Procedure Laterality Date   • CYSTOSCOPY     • TONSILLECTOMY         Family History:  family history includes Alcohol abuse in his father; COPD in his mother; Cerebral aneurysm in his father; Dementia in his mother.     Social History:  reports that he has quit smoking. His smoking use included cigarettes. He has a 30.00 pack-year smoking history. He does not have any smokeless tobacco history on file.  Social History     Social History Narrative   • Not on file       Medications:  amLODIPine, finasteride, fluticasone, losartan, metFORMIN, mupirocin, and tamsulosin    No Known Allergies    Objective   Objective     Vital Signs:   Temp:  [98.1 °F (36.7 °C)-98.4 °F (36.9 °C)] 98.1 °F (36.7 °C)  Heart Rate:  [] 82  Resp:  [14-26] 14  BP: (125-164)/(62-83) 155/62    Physical Exam   Constitutional: Awake, alert, pleasantly confused but alert and orient X3  Eyes: PERRLA, sclerae anicteric, no conjunctival injection  HENT: NCAT, mucous membranes moist  Neck: Supple, no thyromegaly, no lymphadenopathy, trachea midline  Respiratory: Clear to auscultation bilaterally,  nonlabored respirations   Cardiovascular: RRR, no murmurs, rubs, or gallops, palpable pedal pulses bilaterally  Gastrointestinal: Positive bowel sounds, soft, nontender, nondistended  Musculoskeletal: No bilateral ankle edema, no clubbing or cyanosis to extremities  Abrasion to left elbow  Psychiatric: Appropriate affect, cooperative  Neurologic: Oriented x 3, strength symmetric in all extremities, Cranial Nerves grossly intact to confrontation, speech clear  Skin: No rashes      Result Review:  I have personally reviewed the results from the time of this admission to 4/11/2023 23:26 EDT and agree with these findings:  [x]  Laboratory list / accordion  []  Microbiology  []  Radiology  []  EKG/Telemetry   []  Cardiology/Vascular   []  Pathology  [x]  Old records  []  Other:  Most notable findings include: vitals stable. Glucose 184. Sodium 130. Creatinine 1.99. BUN 59. Alk phos 198. ALT 79. . Albumin 3.3. procal 3.6. WBC 15.2    LAB RESULTS:      Lab 04/11/23 2230 04/11/23  1657   WBC  --  15.26*   HEMOGLOBIN  --  15.1   HEMATOCRIT  --  43.4   PLATELETS  --  212   NEUTROS ABS  --  12.40*   IMMATURE GRANS (ABS)  --  0.08*   LYMPHS ABS  --  0.90   MONOS ABS  --  1.82*   EOS ABS  --  0.02   MCV  --  91.2   PROCALCITONIN  --  3.61*   LACTATE 2.0  --          Lab 04/11/23  1657   SODIUM 130*   POTASSIUM 3.7   CHLORIDE 96*   CO2 19.0*   ANION GAP 15.0   BUN 59*   CREATININE 1.99*   EGFR 31.9*   GLUCOSE 184*   CALCIUM 8.9   MAGNESIUM 2.3         Lab 04/11/23  1657   TOTAL PROTEIN 6.9   ALBUMIN 3.3*   GLOBULIN 3.6   ALT (SGPT) 79*   AST (SGOT) 106*   BILIRUBIN 0.9   ALK PHOS 198*         Lab 04/11/23  1657   HSTROP T 23*                 Brief Urine Lab Results  (Last result in the past 365 days)      Color   Clarity   Blood   Leuk Est   Nitrite   Protein   CREAT   Urine HCG        04/11/23 1841 Yellow   Cloudy   Large (3+)   Moderate (2+)   Negative   100 mg/dL (2+)               Microbiology Results (last 10 days)      ** No results found for the last 240 hours. **          CT Abdomen Pelvis Without Contrast    Result Date: 4/11/2023  CT ABDOMEN PELVIS WO CONTRAST Date of Exam: 4/11/2023 10:34 PM EDT Indication: new UTI in male , microscopic hematuria. Comparison: None available. Technique: Axial CT images were obtained of the abdomen and pelvis without the administration of contrast. Reconstructed coronal and sagittal images were also obtained. Automated exposure control and iterative construction methods were used. Findings: The lung bases are clear. Evaluation of the body wall soft tissues demonstrates no acute or suspicious findings. There is advanced multilevel spondylosis and lumbar dextrocurvature. The liver is homogeneous. Cholelithiasis is present with the gallbladder  otherwise nondistended. Homogeneous spleen. No suspicious focal pancreatic lesion. There is extensive bilateral nephrolithiasis, without evidence of obstruction on the right. On the left, there is moderate to severe hydronephrosis and a rounded 6 mm stone is present within the mid portion of the left ureter. A large stone is noted in the bladder. There is severe wall edema with adjacent mesenteric stranding present involving the transverse portion of the duodenum, incompletely characterized given lack of IV contrast, with appearance concerning for an adjacent inflamed large duodenal diverticulum, with component of small adjacent abscess suspected, measuring approximately 3 cm on image 56 of the axial series. There is no evidence of associated bowel obstruction. There is no overt pneumoperitoneum. Atherosclerotic abdominal aorta. The pelvic viscera demonstrate no additional acute findings.     Impression: Impression: Bilateral nephrolithiasis is present, with an obstructing stone present in the midportion of the left ureter, with associated left-sided hydronephrosis and nephropathy. There is severe wall edema with adjacent mesenteric stranding present  involving the transverse portion of the duodenum, incompletely characterized given lack of IV contrast, with appearance concerning for an adjacent inflamed large duodenal diverticulum,  with possible small adjacent abscess suspected, measuring approximately 3 cm on image 56 of the axial series. There is no evidence of associated bowel obstruction. Consider contrast-enhanced CT to further evaluate. Electronically Signed: Myron Vyas  4/11/2023 11:08 PM EDT  Workstation ID: [id number]    XR Chest 1 View    Result Date: 4/11/2023  XR CHEST 1 VW Date of Exam: 4/11/2023 5:22 PM EDT Indication: Weak/Dizzy/AMS triage protocol. Comparison: None available. FINDINGS: Mild chronic changes of the lung fields are present without focal airspace opacity. There is no significant pleural effusion or distinct pneumothorax. Normal heart and mediastinal contours.     Impression: Mild chronic changes of the lung fields without evidence of acute cardiopulmonary abnormality. Electronically Signed: Myron Vyas  4/11/2023 6:10 PM EDT  Workstation ID: [id number]    CT Head Without Contrast Stroke Protocol    Result Date: 4/11/2023  CT HEAD WO CONTRAST STROKE PROTOCOL Date of Exam: 4/11/2023 10:31 PM EDT Indication: aMS, ATAXIA. Comparison: None available. Technique: Axial CT images were obtained of the head without contrast administration.  Reconstructed coronal and sagittal images were also obtained. Automated exposure control and iterative construction methods were used. FINDINGS: Gray-white differentiation is maintained and there is no evidence of intracranial hemorrhage, mass or mass effect. Age-related changes of the brain are present including volume loss and typical periventricular sequela of chronic small vessel ischemia. There is otherwise no evidence of intracranial hemorrhage, mass or mass effect. The ventricles are normal in size and configuration accounting for surrounding volume loss. The orbits are normal and the paranasal  sinuses are grossly clear.     Impression: Age-related changes of the brain as above, otherwise without evidence of acute intracranial abnormality.  Electronically Signed: Myron Vyas  4/11/2023 10:56 PM EDT  Workstation ID: [id number]          Assessment & Plan   Assessment & Plan       Generalized weakness    Acute UTI    Elevated serum creatinine    Left obsructing nephrolithiasis    Elevated liver enzymes    Hypertension    Gout    BPH (benign prostatic hyperplasia)    86 yo male here with confusion found to have acute UTI with obstructing stone plus possible diverticular abscess. Admitting for urology, abx, and further imaging.    Confusion  Generalized Weakness  Acute UTI  Obstructing stone  Concerning for Diverticular abscess   - AMS likely secondary to infectious process  - CT head negative  - PT/OT  - UA positive for acute infection, hematuria, TNTC WBCs  - complicated by obstructing left kidney stone with hydronephrosis  - history of BPH  - blood and urine cultures pending  - IV fluids  - administered rocephin in ED. Change to Zosyn to cover UTI and GI issues  - add flomax  - consult to urology  - pain and nausea control as needed  - am labs    Abnormal CT abdomen  - imaging without constrast concerning for diverticular abscess, suggests repeat scan WITH CONTRAST to further access intraabdominal process.  - check procalcitonin, lactic acid, inflammatory markers  - continue with Zosyn per above  - colorectal input as needed  - NPO for now      Elevated Liver Enzymes  - acute hepatitis panel  - CT A/P  - consider US gallbladder    Elevated Serum Creatinine  - likely post renal related to obstruction  - baseline unknown  - GFR stable at 59  - strict I&O  - avoid nephrotoxins  - continue with IV fluids     HTN  - on HCTZ, hold per DAE    Hyperuricemia   - previously on colchicine. Check uric acid level   - avoid restarting HCTZ due to DAE and hyperuricemia     DVT prophylaxis: mechanical    CODE STATUS: full  "code  Level Of Support Discussed With: Patient  Code Status (Patient has no pulse and is not breathing): CPR (Attempt to Resuscitate)  Medical Interventions (Patient has pulse or is breathing): Full Support      Expected Discharge  TBD    This note has been completed as part of a split-shared workflow.     Electronically signed by Vahe Rendon PA-C, 04/11/23, 11:29 PM EDT.    Total time spent: 90  Time spent includes time reviewing chart, face-to-face time, counseling patient/family/caregiver, ordering medications/tests/procedures, communicating with other health care professionals, documenting clinical information in the electronic health record, and coordination of care.          Attending   Admission Attestation       I have performed an independent face-to-face diagnostic evaluation including performing an independent physical examination as documented here.  The documented plan of care above was reviewed and developed with the advanced practice clinician (APC).      Brief Summary Statement:   Abdiaziz Enrique Jr. is a 87 y.o. male with past medical history of HTN, BPH, T2DM, hyperuricemia, and history of kidney stones and bladder stone for which patient had been advised to have surgery but it refused to 10 years ago..pt currently lives alone and still drives. Family brought to ED after 3 day hx of worsening confusion. Disoriented and sleeping the most of 3 days. Reports discolored urine, dark and at times noted blood in urine. Denies fever or chills. Denies difficulty urinating. However his daughter reported he had complained of dysuria. Denies abdominal pain, denies N/V. Denies blood in stools. Pt does admit to being lightheaded and dizzy for the past 6 weeks. Pt daughter reports he fell and hit his head 3 days ago. Pt reports he is off balance and \"staggers when he walks\" and that is why he is falling. Noted to have RBCs in urine and TNTC WBCs. CT of abd and pelvis obtained noted \" bilateral " "nephrolithiasis with an obstructing stone present in the midportion of the left ureter with associated left hydronephrosis and nephropathy.  \"Also noted to have severe wall edema with adjacent mesenteric stranding present in the transverse portion of the duodenum incompletely characterized due to lack of oral contrast.  \"Possible small adjacent abscess suspected measuring approximately 3 cm.\"Due to these findings we will continue patient on vancomycin and Zosyn.  We will consult urology and colorectal surgery.  Due to confusion and falls we will also CT patient's head.  Findings less suspicious for stroke or ischemic event and confusion likely secondary to sepsis.  Patient was briefly evaluated by stroke navigator.           Remainder of detailed HPI is as noted by APC and has been reviewed and/or edited by me for completeness.    Attending Physical Exam:  Temp:  [98.1 °F (36.7 °C)-98.4 °F (36.9 °C)] 98.1 °F (36.7 °C)  Heart Rate:  [] 73  Resp:  [14-26] 16  BP: (125-164)/(60-83) 135/60    Constitutional: Awake, confused, poor recall   Eyes: PERRLA, sclerae anicteric, no conjunctival injection  HENT: NCAT, mucous membranes moist  Neck: Supple, no thyromegaly, no lymphadenopathy, trachea midline  Respiratory: Clear to auscultation bilaterally, nonlabored respirations   Cardiovascular: RRR, no murmurs, rubs, or gallops, palpable pedal pulses bilaterally  Gastrointestinal: Positive bowel sounds, soft, nontender, nondistended  Musculoskeletal: No bilateral ankle edema, no clubbing or cyanosis to extremities  Psychiatric: Appropriate affect, cooperative  Neurologic: limited recall of events and timelines, Oriented x 3, strength symmetric in all extremities, Cranial Nerves grossly intact to confrontation, speech clear  Skin: No rashes      Brief Assessment/Plan :  See detailed assessment and plan developed with APC which I have reviewed and/or edited for completeness.            Bridgette Hernadez DO  04/12/23          "

## 2023-04-12 NOTE — PLAN OF CARE
Goal Outcome Evaluation:                 Problem: Fall Injury Risk  Goal: Absence of Fall and Fall-Related Injury  Intervention: Identify and Manage Contributors  Recent Flowsheet Documentation  Taken 4/12/2023 0200 by Pascual Maldonado RN  Medication Review/Management: medications reviewed  Taken 4/12/2023 0000 by Pascual Maldonado RN  Medication Review/Management: medications reviewed  Taken 4/11/2023 2312 by Pascual Maldonado RN  Medication Review/Management: medications reviewed  Intervention: Promote Injury-Free Environment  Recent Flowsheet Documentation  Taken 4/12/2023 0200 by Pascual Maldonado, RN  Safety Promotion/Fall Prevention:   activity supervised   safety round/check completed   toileting scheduled  Taken 4/12/2023 0000 by Pascual Maldonado RN  Safety Promotion/Fall Prevention:   activity supervised   safety round/check completed   toileting scheduled  Taken 4/11/2023 2312 by Pascual Maldonado RN  Safety Promotion/Fall Prevention:   activity supervised   safety round/check completed   toileting scheduled     VSS, voids well, rested through the night, pain managed with PRN medications, will continue to monitor for changes.

## 2023-04-12 NOTE — THERAPY EVALUATION
Patient Name: Abdiaziz Enrique Jr.  : 1935    MRN: [id number]                              Today's Date: 2023       Admit Date: 2023    Visit Dx:     ICD-10-CM ICD-9-CM   1. Generalized weakness  R53.1 780.79   2. Acute kidney injury  N17.9 584.9   3. Hyponatremia  E87.1 276.1   4. Confusion  R41.0 298.9   5. Acute UTI  N39.0 599.0   6. Elevated liver enzymes  R74.8 790.5     Patient Active Problem List   Diagnosis   • Generalized weakness   • Acute UTI   • Elevated serum creatinine   • Elevated liver enzymes   • Hypertension   • Gout   • BPH (benign prostatic hyperplasia)   • Left obsructing nephrolithiasis     Past Medical History:   Diagnosis Date   • Bladder stone    • BPH (benign prostatic hyperplasia) 2023   • Gout 2023   • Hypertension 2023     Past Surgical History:   Procedure Laterality Date   • CYSTOSCOPY     • CYSTOSCOPY W/ URETERAL STENT PLACEMENT     • TONSILLECTOMY        General Information     Row Name 23 1518          OT Time and Intention    Document Type evaluation  -     Mode of Treatment occupational therapy  -     Row Name 23 1518          General Information    Patient Profile Reviewed yes  -     Prior Level of Function independent:;all household mobility;gait;community mobility;transfer;bed mobility;ADL's;yard work;driving;using stairs  -     Existing Precautions/Restrictions fall  -     Barriers to Rehab medically complex;hearing deficit  -     Row Name 23 1518          Living Environment    People in Home alone  -     Row Name 23 1518          Home Main Entrance    Number of Stairs, Main Entrance two  -     Stair Railings, Main Entrance railing on right side (ascending)  -     Row Name 23 1518          Stairs Within Home, Primary    Number of Stairs, Within Home, Primary none  -HM     Stair Railings, Within Home, Primary none  -     Row Name 23 1518          Cognition    Orientation Status  (Cognition) oriented x 4  -     Row Name 04/12/23 1518          Safety Issues, Functional Mobility    Safety Issues Affecting Function (Mobility) safety precautions follow-through/compliance;safety precaution awareness;awareness of need for assistance  -     Impairments Affecting Function (Mobility) balance;coordination;postural/trunk control  -           User Key  (r) = Recorded By, (t) = Taken By, (c) = Cosigned By    Initials Name Provider Type     Juli Pelaez OT Occupational Therapist                 Mobility/ADL's     Row Name 04/12/23 1520          Bed Mobility    Bed Mobility scooting/bridging;supine-sit  -     Scooting/Bridging [place] (Bed Mobility) supervision;verbal cues  -     Supine-Sit [place] (Bed Mobility) minimum assist (75% patient effort);verbal cues  -     Bed Mobility, Safety Issues impaired trunk control for bed mobility  -     Assistive Device (Bed Mobility) draw sheet  -     Comment, (Bed Mobility) Amina to advance into sittng.  -     Row Name 04/12/23 1520          Transfers    Transfers sit-stand transfer  -     Row Name 04/12/23 1520          Sit-Stand Transfer    Sit-Stand [place] (Transfers) contact guard;verbal cues  -     Assistive Device (Sit-Stand Transfers) other (see comments)  L UE support  -     Row Name 04/12/23 1520          Functional Mobility    Functional Mobility- Ind. Level minimum assist (75% patient effort)  -     Functional Mobility- Device other (see comments)  L UE support  -     Functional Mobility- Safety Issues balance decreased during turns;sequencing ability decreased;weight-shifting ability decreased;step length decreased  -     Functional Mobility- Comment CGA however required Amina at time due to a few noted LOB.  -     Row Name 04/12/23 1520          Activities of Daily Living    BADL Assessment/Intervention lower body dressing  -     Row Name 04/12/23 1520          Lower Body Dressing  Assessment/Training    [place] Level (Lower Body Dressing) don;socks;independent  -HM     Position (Lower Body Dressing) edge of bed sitting  -HM           User Key  (r) = Recorded By, (t) = Taken By, (c) = Cosigned By    Initials Name Provider Type     Juli Pelaez OT Occupational Therapist               Obj/Interventions     Row Name 04/12/23 1521          Sensory Assessment (Somatosensory)    Sensory Assessment (Somatosensory) sensation intact  -HM     Row Name 04/12/23 1521          Vision Assessment/Intervention    Visual Impairment/Limitations WFL  -HM     Row Name 04/12/23 1521          Range of Motion Comprehensive    General Range of Motion no range of motion deficits identified  -HM     Comment, General Range of Motion BUE WNL  -HM     Row Name 04/12/23 1521          Strength Comprehensive (MMT)    General Manual Muscle Testing (MMT) Assessment no strength deficits identified  -HM     Comment, General Manual Muscle Testing (MMT) Assessment B UE 5/5  -HM     Row Name 04/12/23 1521          Motor Skills    Motor Skills coordination;functional endurance  -HM     Coordination WFL  -HM     Functional Endurance decreased  -HM     Row Name 04/12/23 1521          Balance    Balance Assessment sitting static balance;sitting dynamic balance;standing static balance;standing dynamic balance  -HM     Static Sitting Balance standby assist  -HM     Dynamic Sitting Balance minimal assist  -HM     Position, Sitting Balance unsupported;sitting edge of bed  -HM     Static Standing Balance contact guard  -HM     Dynamic Standing Balance contact guard  -HM     Position/Device Used, Standing Balance supported  L UE support  -HM           User Key  (r) = Recorded By, (t) = Taken By, (c) = Cosigned By    Initials Name Provider Type     Juli Pelaez OT Occupational Therapist               Goals/Plan     Row Name 04/12/23 1534          Bed Mobility Goal 1 (OT)    Activity/Assistive Device (Bed Mobility Goal  1, OT) sit to supine/supine to sit;scooting  -HM     [place] Level/Cues Needed (Bed Mobility Goal 1, OT) contact guard required;verbal cues required  -HM     Time Frame (Bed Mobility Goal 1, OT) long term goal (LTG);by discharge  -HM     Progress/Outcomes (Bed Mobility Goal 1, OT) goal ongoing  [facility]     Row Name 04/12/23 1534          Transfer Goal 1 (OT)    Activity/Assistive Device (Transfer Goal 1, OT) sit-to-stand/stand-to-sit;bed-to-chair/chair-to-bed;toilet  -HM     [place] Level/Cues Needed (Transfer Goal 1, OT) contact guard required;verbal cues required  -HM     Time Frame (Transfer Goal 1, OT) by discharge;long term goal (LTG)  -HM     Progress/Outcome (Transfer Goal 1, OT) goal ongoing  -     Row Name 04/12/23 1534          Toileting Goal 1 (OT)    Activity/Device (Toileting Goal 1, OT) adjust/manage clothing;perform perineal hygiene  -HM     [place] Level/Cues Needed (Toileting Goal 1, OT) supervision required  -HM     Time Frame (Toileting Goal 1, OT) by discharge;long term goal (LTG)  -HM     Progress/Outcome (Toileting Goal 1, OT) goal ongoing  -     Row Name 04/12/23 1534          Grooming Goal 1 (OT)    Activity/Device (Grooming Goal 1, OT) hair care;oral care;wash face, hands  -HM     [place] (Grooming Goal 1, OT) minimum assist (75% or more patient effort);verbal cues required  -HM     Time Frame (Grooming Goal 1, OT) by discharge;long term goal (LTG)  -HM     Strategies/Barriers (Grooming Goal 1, OT) sink side  -HM     Progress/Outcome (Grooming Goal 1, OT) goal ongoing  -     Row Name 04/12/23 1534          Therapy Assessment/Plan (OT)    Planned Therapy Interventions (OT) adaptive equipment training;BADL retraining;activity tolerance training;functional balance retraining;occupation/activity based interventions;ROM/therapeutic exercise;transfer/mobility retraining  -           User Key  (r) = Recorded By, (t) = Taken By, (c) = Cosigned By    Initials Name Provider  Type     Juli Pelaez, OT Occupational Therapist               Clinical Impression     Row Name 04/12/23 1532          Pain Assessment    Pretreatment Pain Rating 0/10 - no pain  -     Posttreatment Pain Rating 0/10 - no pain  -     Row Name 04/12/23 1532          Plan of Care Review    Plan of Care Reviewed With patient;daughter  -     Progress improving  -     Outcome Evaluation OT eval complete. Pt presents below baseline with decreased balance and activity tolerance. [place] for bed mobility and CGA for transfers. MinAx1 for all functional mobility due to noted LOB. Independent with LBD. Recommend d/c home with 24/7 assist and OP servives.  -     Row Name 04/12/23 1532          Therapy Assessment/Plan (OT)    Patient/Family Therapy Goal Statement (OT) Pt would like to imrove and return home.  -     Rehab Potential (OT) good, to achieve stated therapy goals  -     Criteria for Skilled Therapeutic Interventions Met (OT) yes;skilled treatment is necessary  -     Therapy Frequency (OT) daily  -     Row Name 04/12/23 1532          Therapy Plan Review/Discharge Plan (OT)    Anticipated Discharge Disposition (OT) home with 24/7 care;home with outpatient therapy services  -     Row Name 04/12/23 1532          Vital Signs    Intra Systolic BP Rehab 142  -HM     Intra Treatment Diastolic BP 72  -HM     O2 Delivery Pre Treatment room air  -HM     O2 Delivery Intra Treatment room air  -HM     O2 Delivery Post Treatment room air  -HM     Pre Patient Position Supine  -     Intra Patient Position Standing  -     Post Patient Position Sitting  -     Row Name 04/12/23 1532          Positioning and Restraints    Pre-Treatment Position in bed  -     Post Treatment Position chair  -HM     In Chair notified nsg;reclined;call light within reach;encouraged to call for assist;exit alarm on;with family/caregiver;waffle cushion;on mechanical lift sling  -           User Key  (r) = Recorded By, (t) =  Taken By, (c) = Cosigned By    Initials Name Provider Type     Juli Pelaez, OT Occupational Therapist               Outcome Measures     Row Name 04/12/23 1534          How much help from another is currently needed...    Putting on and taking off regular lower body clothing? 4  -HM     Bathing (including washing, rinsing, and drying) 3  -HM     Toileting (which includes using toilet bed pan or urinal) 3  -HM     Putting on and taking off regular upper body clothing 4  -HM     Taking care of personal grooming (such as brushing teeth) 3  -HM     Eating meals 4  -HM     AM-PAC 6 Clicks Score (OT) 21  -HM     Row Name 04/12/23 1438 04/12/23 0800       How much help from another person do you currently need...    Turning from your back to your side while in flat bed without using bedrails? 4  -LR 3  -BC    Moving from lying on back to sitting on the side of a flat bed without bedrails? 3  -LR 3  -BC    Moving to and from a bed to a chair (including a wheelchair)? 3  -LR 3  -BC    Standing up from a chair using your arms (e.g., wheelchair, bedside chair)? 3  -LR 3  -BC    Climbing 3-5 steps with a railing? 3  -LR 3  -BC    To walk in hospital room? 3  -LR 3  -BC    AM-PAC 6 Clicks Score (PT) 19  -LR 18  -BC    Highest level of mobility 6 --> Walked 10 steps or more  -LR 6 --> Walked 10 steps or more  -BC    Row Name 04/12/23 1534 04/12/23 1438       Functional Assessment    Outcome Measure [facility] AM-PAC 6 Clicks Daily Activity (OT)  - AM-PAC 6 Clicks Basic Mobility (PT)  -LR          User Key  (r) = Recorded By, (t) = Taken By, (c) = Cosigned By    Initials Name Provider Type    LR Melissa Tang, PT Physical Therapist     Juli Pelaez, OT Occupational Therapist    BC Lesley Phelps, RN Registered Nurse                Occupational Therapy Education     Title: PT OT SLP Therapies (In Progress)     Topic: Occupational Therapy (In Progress)     Point: ADL training (Done)     Description:    Instruct learner(s) on proper safety adaptation and remediation techniques during self care or transfers.   Instruct in proper use of assistive devices.              Learning Progress Summary           Patient Acceptance, E,TB, VU,NR by  at 4/12/2023 1535                   Point: Home exercise program (Not Started)     Description:   Instruct learner(s) on appropriate technique for monitoring, assisting and/or progressing therapeutic exercises/activities.              Learner Progress:  Not documented in this visit.          Point: Precautions (Done)     Description:   Instruct learner(s) on prescribed precautions during self-care and functional transfers.              Learning Progress Summary           Patient Acceptance, E,TB, VU,NR by  at 4/12/2023 1535                   Point: Body mechanics (Done)     Description:   Instruct learner(s) on proper positioning and spine alignment during self-care, functional mobility activities and/or exercises.              Learning Progress Summary           Patient Acceptance, E,TB, VU,NR by  at 4/12/2023 1535                               User Key     Initials Effective Dates Name Provider Type Discipline     10/25/22 -  Juli Pelaez OT Occupational Therapist OT              OT Recommendation and Plan  Planned Therapy Interventions (OT): adaptive equipment training, BADL retraining, activity tolerance training, functional balance retraining, occupation/activity based interventions, ROM/therapeutic exercise, transfer/mobility retraining  Therapy Frequency (OT): daily  Plan of Care Review  Plan of Care Reviewed With: patient, daughter  Progress: improving  Outcome Evaluation: OT eval complete. Pt presents below baseline with decreased balance and activity tolerance. [place] for bed mobility and CGA for transfers. MinAx1 for all functional mobility due to noted LOB. Independent with LBD. Recommend d/c home with 24/7 assist and OP servives.     Time Calculation:     Time Calculation- OT     Row Name 04/12/23 1445 04/12/23 1438          Time Calculation- OT    OT Start Time 1445  -HM --     OT Received On 04/12/23  -HM --     OT Goal Re-Cert Due Date 04/22/23  -HM --        Timed Charges    85563 - Gait Training Minutes  -- 10  -LR        Untimed Charges    OT Eval/Re-eval Minutes 60  -HM --        Total Minutes    Timed Charges Total Minutes -- 10  -LR     Untimed Charges Total Minutes 60  -HM --      Total Minutes 60  -HM 10  -LR           User Key  (r) = Recorded By, (t) = Taken By, (c) = Cosigned By    Initials Name Provider Type    LR Melissa Tang, PT Physical Therapist     Juli Pelaez, OT Occupational Therapist              Therapy Charges for Today     Code Description Service Date Service Provider Modifiers Qty    39058896959 HC OT EVAL LOW COMPLEXITY 4 4/12/2023 Juli Pelaez OT GO 1               Juli Pelaez OT  4/12/2023

## 2023-04-12 NOTE — THERAPY EVALUATION
Patient Name: Abdiaziz Enrique Jr.  : 1935    MRN: [id number]                              Today's Date: 2023       Admit Date: 2023    Visit Dx:     ICD-10-CM ICD-9-CM   1. Generalized weakness  R53.1 780.79   2. Acute kidney injury  N17.9 584.9   3. Hyponatremia  E87.1 276.1   4. Confusion  R41.0 298.9   5. Acute UTI  N39.0 599.0   6. Elevated liver enzymes  R74.8 790.5     Patient Active Problem List   Diagnosis   • Generalized weakness   • Acute UTI   • Elevated serum creatinine   • Elevated liver enzymes   • Hypertension   • Gout   • BPH (benign prostatic hyperplasia)   • Left obsructing nephrolithiasis     Past Medical History:   Diagnosis Date   • Bladder stone    • BPH (benign prostatic hyperplasia) 2023   • Gout 2023   • Hypertension 2023     Past Surgical History:   Procedure Laterality Date   • CYSTOSCOPY     • CYSTOSCOPY W/ URETERAL STENT PLACEMENT     • TONSILLECTOMY        General Information     Row Name 23 1438          Physical Therapy Time and Intention    Document Type evaluation  -LR     Mode of Treatment physical therapy  -LR     Row Name 23 1438          General Information    Patient Profile Reviewed yes  -LR     Prior Level of Function independent:;all household mobility;community mobility;gait;transfer;bed mobility;ADL's;home management;cooking;cleaning;driving;shopping;using stairs;yard work  not using AD PTA  -LR     Existing Precautions/Restrictions fall;other (see comments)  [place], dizziness  -LR     Barriers to Rehab hearing deficit  -LR     Row Name 23 1438          Living Environment    People in Home alone;other (see comments)  children can assist patient at all times upon d/c home if needed  -LR     Row Name 23 1438          Home Main Entrance    Number of Stairs, Main Entrance two  2 separate steps  -LR     Stair Railings, Main Entrance railing on right side (ascending)  -LR     Row Name 23 1438          Stairs Within  Home, Primary    Number of Stairs, Within Home, Primary none  -LR     Row Name 04/12/23 1438          Cognition    Orientation Status (Cognition) oriented x 4  -LR     Row Name 04/12/23 1438          Safety Issues, Functional Mobility    Safety Issues Affecting Function (Mobility) safety precautions follow-through/compliance;insight into deficits/self-awareness;awareness of need for assistance;safety precaution awareness  -LR     Impairments Affecting Function (Mobility) strength;balance;endurance/activity tolerance;shortness of breath  -LR           User Key  (r) = Recorded By, (t) = Taken By, (c) = Cosigned By    Initials Name Provider Type    LR Melissa Tang, PT Physical Therapist               Mobility     Row Name 04/12/23 1438          Bed Mobility    Bed Mobility supine-sit  -LR     Supine-Sit [place] (Bed Mobility) verbal cues;minimum assist (75% patient effort)  -LR     Comment, (Bed Mobility) Verbal cues to move LEs towards EOB and to push up from bed to raise trunk into sitting. Required min assist to pull trunk into sitting. C/o mild dizziness upon sitting up. Improved with prolonged seated rest.  -LR     Row Name 04/12/23 1438          Transfers    Comment, (Transfers) Verbal cues to push up from bed to stand and to reach back for chair to lower into sitting.  -LR     Row Name 04/12/23 1438          Bed-Chair Transfer    Bed-Chair [place] (Transfers) not tested  -LR     Row Name 04/12/23 1438          Sit-Stand Transfer    Sit-Stand [place] (Transfers) verbal cues;contact guard  -LR     Assistive Device (Sit-Stand Transfers) other (see comments)  no AD  -LR     Row Name 04/12/23 1438          Gait/Stairs (Locomotion)    [place] Level (Gait) verbal cues;contact guard  -LR     Assistive Device (Gait) other (see comments)  L HHA  -LR     Distance in Feet (Gait) 200  -LR     Deviations/Abnormal Patterns (Gait) bilateral deviations;fletcher decreased;gait speed decreased;stride  length decreased  -LR     Bilateral Gait Deviations heel strike decreased  -LR     [place] Level (Stairs) not tested  -LR     Comment, (Gait/Stairs) Patient ambulated with step through gait pattern at slow pace. Occasional mild unsteadiness with ambulation. Improved as gait progressed and L HHA for stability. Gait limited by fatigue. Patient denied dizziness with ambulation.  -LR           User Key  (r) = Recorded By, (t) = Taken By, (c) = Cosigned By    Initials Name Provider Type    LR Melissa Tang, PT Physical Therapist               Obj/Interventions     Row Name 04/12/23 1438          Range of Motion Comprehensive    General Range of Motion bilateral lower extremity ROM WFL  -LR     Row Name 04/12/23 1438          Strength Comprehensive (MMT)    General Manual Muscle Testing (MMT) Assessment lower extremity strength deficits identified  -LR     Row Name 04/12/23 1438          Balance    Balance Assessment sitting static balance;sitting dynamic balance;standing static balance;standing dynamic balance  -LR     Static Sitting Balance standby assist  -LR     Dynamic Sitting Balance minimal assist;other (see comments)  posterior lean during MMT sitting EOB  -LR     Position, Sitting Balance unsupported;sitting edge of bed  -LR     Static Standing Balance contact guard  -LR     Dynamic Standing Balance contact guard  -LR     Position/Device Used, Standing Balance supported;other (see comments)  L HHA  -LR     Row Name 04/12/23 1438          Sensory Assessment (Somatosensory)    Sensory Assessment (Somatosensory) LE sensation intact;other (see comments)  denies numbness/tingling; reports light touch equal and intact upon assessment  -LR     Row Name 04/12/23 1438          Lower Extremity (Manual Muscle Testing)    Comment, MMT: Lower Extremity B hip flexors: 4/5, B knee extensors: 5/5, B knee flexors: 4+/5, B ankle DFs: 5/5  -LR           User Key  (r) = Recorded By, (t) = Taken By, (c) = Cosigned By     Initials Name Provider Type    LR Melissa Tang, PT Physical Therapist               Goals/Plan     Row Name 04/12/23 1438          Bed Mobility Goal 1 (PT)    Activity/Assistive Device (Bed Mobility Goal 1, PT) sit to supine/supine to sit  -LR     [place] Level/Cues Needed (Bed Mobility Goal 1, PT) independent  -LR     Time Frame (Bed Mobility Goal 1, PT) long term goal (LTG);5 days  -LR     Progress/Outcomes (Bed Mobility Goal 1, PT) goal ongoing  -LR     Row Name 04/12/23 1438          Transfer Goal 1 (PT)    Activity/Assistive Device (Transfer Goal 1, PT) sit-to-stand/stand-to-sit;bed-to-chair/chair-to-bed  -LR     [place] Level/Cues Needed (Transfer Goal 1, PT) standby assist  -LR     Time Frame (Transfer Goal 1, PT) long term goal (LTG);5 days  -LR     Progress/Outcome (Transfer Goal 1, PT) goal ongoing  -LR     Row Name 04/12/23 1438          Gait Training Goal 1 (PT)    Activity/Assistive Device (Gait Training Goal 1, PT) gait (walking locomotion)  -LR     [place] Level (Gait Training Goal 1, PT) standby assist  -LR     Distance (Gait Training Goal 1, PT) 500 feet  -LR     Time Frame (Gait Training Goal 1, PT) long term goal (LTG);5 days  -LR     Progress/Outcome (Gait Training Goal 1, PT) goal ongoing  -LR     Row Name 04/12/23 1438          Stairs Goal 1 (PT)    Activity/Assistive Device (Stairs Goal 1, PT) ascending stairs;descending stairs;step-to-step;using handrail, right  -LR     [place] Level/Cues Needed (Stairs Goal 1, PT) contact guard required  -LR     Number of Stairs (Stairs Goal 1, PT) 2  -LR     Time Frame (Stairs Goal 1, PT) long term goal (LTG);5 days  -LR     Progress/Outcome (Stairs Goal 1, PT) goal ongoing  -LR     Row Name 04/12/23 1438          Therapy Assessment/Plan (PT)    Planned Therapy Interventions (PT) balance training;bed mobility training;gait training;home exercise program;patient/family education;stair training;strengthening;transfer training   -LR           User Key  (r) = Recorded By, (t) = Taken By, (c) = Cosigned By    Initials Name Provider Type    Melissa Orozco, PT Physical Therapist               Clinical Impression     Row Name 04/12/23 1438          Pain    Pretreatment Pain Rating 0/10 - no pain  -LR     Posttreatment Pain Rating 0/10 - no pain  -LR     Pain Intervention(s) Ambulation/increased activity;Repositioned  -LR     Row Name 04/12/23 1438          Plan of Care Review    Plan of Care Reviewed With patient;daughter  -LR     Progress improving  -LR     Outcome Evaluation Patient ambulated 200 feet with CGA and L HHA, demonstrating occasional mild unsteadiness, limited by fatigue. Patient currently below baseline, demonstrating decreased functional mobility status, impaired balance, and decreased strength. Will address these deficits to promote return to PLOF. Recommend d/c home wt 24/7 assist from family and outpatient PT.  -LR     Row Name 04/12/23 1438          Therapy Assessment/Plan (PT)    Patient/Family Therapy Goals Statement (PT) get better, go home  -LR     Rehab Potential (PT) good, to achieve stated therapy goals  -LR     Criteria for Skilled Interventions Met (PT) yes;meets criteria;skilled treatment is necessary  -LR     Therapy Frequency (PT) daily  -LR     Row Name 04/12/23 1438          Positioning and Restraints    Pre-Treatment Position in bed  -LR     Post Treatment Position chair  -LR     In Chair notified nsg;reclined;sitting;call light within reach;encouraged to call for assist;exit alarm on;with family/caregiver;legs elevated;compression device;waffle cushion;on mechanical lift sling  -LR           User Key  (r) = Recorded By, (t) = Taken By, (c) = Cosigned By    Initials Name Provider Type    Melissa Orozco, PT Physical Therapist               Outcome Measures     Row Name 04/12/23 1438 04/12/23 0800       How much help from another person do you currently need...    Turning from your back to  your side while in flat bed without using bedrails? 4  -LR 3  -BC    Moving from lying on back to sitting on the side of a flat bed without bedrails? 3  -LR 3  -BC    Moving to and from a bed to a chair (including a wheelchair)? 3  -LR 3  -BC    Standing up from a chair using your arms (e.g., wheelchair, bedside chair)? 3  -LR 3  -BC    Climbing 3-5 steps with a railing? 3  -LR 3  -BC    To walk in hospital room? 3  -LR 3  -BC    AM-PAC 6 Clicks Score (PT) 19  -LR 18  -BC    Highest level of mobility 6 --> Walked 10 steps or more  -LR 6 --> Walked 10 steps or more  -BC    Row Name 04/12/23 1438          Functional Assessment    Outcome Measure Options AM-PAC 6 Clicks Basic Mobility (PT)  -LR           User Key  (r) = Recorded By, (t) = Taken By, (c) = Cosigned By    Initials Name Provider Type    LR Melissa aTng, PT Physical Therapist    BC Lesley Phelps, RN Registered Nurse                             Physical Therapy Education     Title: PT OT SLP Therapies (Done)     Topic: Physical Therapy (Done)     Point: Mobility training (Done)     Learning Progress Summary           Patient Acceptance, E,D, VU,NR by LR at 4/12/2023 1438    Comment: Educated on benefits of mobility and being OOB. Educated on safety with mobility, correct supine to sit t/f technique, correct sit<->stand t/f technique, correct gait mechanics, and progression of POC.                   Point: Home exercise program (Done)     Learning Progress Summary           Patient Acceptance, E,D, VU,NR by LR at 4/12/2023 1438    Comment: Educated on benefits of mobility and being OOB. Educated on safety with mobility, correct supine to sit t/f technique, correct sit<->stand t/f technique, correct gait mechanics, and progression of POC.                   Point: Body mechanics (Done)     Learning Progress Summary           Patient Acceptance, E,D, VU,NR by LR at 4/12/2023 1438    Comment: Educated on benefits of mobility and being OOB. Educated on  safety with mobility, correct supine to sit t/f technique, correct sit<->stand t/f technique, correct gait mechanics, and progression of POC.                   Point: Precautions (Done)     Learning Progress Summary           Patient Acceptance, E,D, VU,NR by LR at 4/12/2023 1438    Comment: Educated on benefits of mobility and being OOB. Educated on safety with mobility, correct supine to sit t/f technique, correct sit<->stand t/f technique, correct gait mechanics, and progression of POC.                               User Key     Initials Effective Dates Name Provider Type Discipline    LR 02/03/23 -  Melissa Tang, PT Physical Therapist PT              PT Recommendation and Plan  Planned Therapy Interventions (PT): balance training, bed mobility training, gait training, home exercise program, patient/family education, stair training, strengthening, transfer training  Plan of Care Reviewed With: patient, daughter  Progress: improving  Outcome Evaluation: Patient ambulated 200 feet with CGA and L HHA, demonstrating occasional mild unsteadiness, limited by fatigue. Patient currently below baseline, demonstrating decreased functional mobility status, impaired balance, and decreased strength. Will address these deficits to promote return to PLOF. Recommend d/c home wt 24/7 assist from family and outpatient PT.     Time Calculation:    PT Charges     Row Name 04/12/23 1438             Time Calculation    Start Time 1438  -LR      PT Received On 04/12/23  -LR      PT Goal Re-Cert Due Date 04/22/23  -LR         Timed Charges    01164 - Gait Training Minutes  10  -LR         Untimed Charges    PT Eval/Re-eval Minutes 40  -LR         Total Minutes    Timed Charges Total Minutes 10  -LR      Untimed Charges Total Minutes 40  -LR       Total Minutes 50  -LR            User Key  (r) = Recorded By, (t) = Taken By, (c) = Cosigned By    Initials Name Provider Type    LR Melissa Tang, PT Physical Therapist               Therapy Charges for Today     Code Description Service Date Service Provider Modifiers Qty    [id number]  GAIT TRAINING EA 15 MIN 4/12/2023 Melissa Tang, PT GP 1    [id number] HC PT EVAL MOD COMPLEXITY 3 4/12/2023 Melissa Tang, PT GP 1          PT G-Codes  Outcome Measure Options: AM-PAC 6 Clicks Basic Mobility (PT)  AM-PAC 6 Clicks Score (PT): 19  PT Discharge Summary  Anticipated Discharge Disposition (PT): home with 24/7 care, home with outpatient therapy services    Melissa Tang, PT  4/12/2023

## 2023-04-13 ENCOUNTER — APPOINTMENT (OUTPATIENT)
Dept: MRI IMAGING | Facility: HOSPITAL | Age: 88
End: 2023-04-13
Payer: OTHER GOVERNMENT

## 2023-04-13 LAB
ALBUMIN SERPL-MCNC: 2.6 G/DL (ref 3.5–5.2)
ALBUMIN/GLOB SERPL: 0.9 G/DL
ALP SERPL-CCNC: 223 U/L (ref 39–117)
ALT SERPL W P-5'-P-CCNC: 106 U/L (ref 1–41)
ANION GAP SERPL CALCULATED.3IONS-SCNC: 10 MMOL/L (ref 5–15)
AST SERPL-CCNC: 179 U/L (ref 1–40)
BACTERIA BLD CULT: ABNORMAL
BACTERIA SPEC AEROBE CULT: ABNORMAL
BASOPHILS # BLD AUTO: 0.04 10*3/MM3 (ref 0–0.2)
BASOPHILS NFR BLD AUTO: 0.3 % (ref 0–1.5)
BILIRUB SERPL-MCNC: 0.7 MG/DL (ref 0–1.2)
BOTTLE TYPE: ABNORMAL
BUN SERPL-MCNC: 46 MG/DL (ref 8–23)
BUN/CREAT SERPL: 25 (ref 7–25)
CALCIUM SPEC-SCNC: 8 MG/DL (ref 8.6–10.5)
CHLORIDE SERPL-SCNC: 104 MMOL/L (ref 98–107)
CO2 SERPL-SCNC: 22 MMOL/L (ref 22–29)
CREAT SERPL-MCNC: 1.84 MG/DL (ref 0.76–1.27)
CRP SERPL-MCNC: 23.79 MG/DL (ref 0–0.5)
D-LACTATE SERPL-SCNC: 1 MMOL/L (ref 0.5–2)
DEPRECATED RDW RBC AUTO: 47.8 FL (ref 37–54)
EGFRCR SERPLBLD CKD-EPI 2021: 35 ML/MIN/1.73
EOSINOPHIL # BLD AUTO: 0.06 10*3/MM3 (ref 0–0.4)
EOSINOPHIL NFR BLD AUTO: 0.4 % (ref 0.3–6.2)
ERYTHROCYTE [DISTWIDTH] IN BLOOD BY AUTOMATED COUNT: 13.9 % (ref 12.3–15.4)
ERYTHROCYTE [SEDIMENTATION RATE] IN BLOOD: 85 MM/HR (ref 0–20)
GLOBULIN UR ELPH-MCNC: 3 GM/DL
GLUCOSE SERPL-MCNC: 173 MG/DL (ref 65–99)
HCT VFR BLD AUTO: 39.1 % (ref 37.5–51)
HGB BLD-MCNC: 13.1 G/DL (ref 13–17.7)
IMM GRANULOCYTES # BLD AUTO: 0.2 10*3/MM3 (ref 0–0.05)
IMM GRANULOCYTES NFR BLD AUTO: 1.5 % (ref 0–0.5)
LYMPHOCYTES # BLD AUTO: 0.91 10*3/MM3 (ref 0.7–3.1)
LYMPHOCYTES NFR BLD AUTO: 6.6 % (ref 19.6–45.3)
MCH RBC QN AUTO: 31.5 PG (ref 26.6–33)
MCHC RBC AUTO-ENTMCNC: 33.5 G/DL (ref 31.5–35.7)
MCV RBC AUTO: 94 FL (ref 79–97)
MONOCYTES # BLD AUTO: 1.41 10*3/MM3 (ref 0.1–0.9)
MONOCYTES NFR BLD AUTO: 10.2 % (ref 5–12)
NEUTROPHILS NFR BLD AUTO: 11.17 10*3/MM3 (ref 1.7–7)
NEUTROPHILS NFR BLD AUTO: 81 % (ref 42.7–76)
NRBC BLD AUTO-RTO: 0 /100 WBC (ref 0–0.2)
PLATELET # BLD AUTO: 223 10*3/MM3 (ref 140–450)
PMV BLD AUTO: 10.9 FL (ref 6–12)
POTASSIUM SERPL-SCNC: 3.9 MMOL/L (ref 3.5–5.2)
PROT SERPL-MCNC: 5.6 G/DL (ref 6–8.5)
RBC # BLD AUTO: 4.16 10*6/MM3 (ref 4.14–5.8)
SODIUM SERPL-SCNC: 136 MMOL/L (ref 136–145)
WBC NRBC COR # BLD: 13.79 10*3/MM3 (ref 3.4–10.8)

## 2023-04-13 PROCEDURE — 25010000002 PIPERACILLIN SOD-TAZOBACTAM PER 1 G: Performed by: FAMILY MEDICINE

## 2023-04-13 PROCEDURE — 80053 COMPREHEN METABOLIC PANEL: CPT | Performed by: HOSPITALIST

## 2023-04-13 PROCEDURE — 99233 SBSQ HOSP IP/OBS HIGH 50: CPT | Performed by: FAMILY MEDICINE

## 2023-04-13 PROCEDURE — 86140 C-REACTIVE PROTEIN: CPT | Performed by: HOSPITALIST

## 2023-04-13 PROCEDURE — 85025 COMPLETE CBC W/AUTO DIFF WBC: CPT | Performed by: HOSPITALIST

## 2023-04-13 PROCEDURE — 99222 1ST HOSP IP/OBS MODERATE 55: CPT | Performed by: PHYSICIAN ASSISTANT

## 2023-04-13 PROCEDURE — 83605 ASSAY OF LACTIC ACID: CPT | Performed by: HOSPITALIST

## 2023-04-13 PROCEDURE — 25010000002 CEFTRIAXONE PER 250 MG: Performed by: FAMILY MEDICINE

## 2023-04-13 PROCEDURE — 85652 RBC SED RATE AUTOMATED: CPT | Performed by: HOSPITALIST

## 2023-04-13 RX ORDER — PANTOPRAZOLE SODIUM 40 MG/10ML
40 INJECTION, POWDER, LYOPHILIZED, FOR SOLUTION INTRAVENOUS
Status: DISCONTINUED | OUTPATIENT
Start: 2023-04-13 | End: 2023-04-19 | Stop reason: HOSPADM

## 2023-04-13 RX ORDER — POLYETHYLENE GLYCOL 3350 17 G/17G
17 POWDER, FOR SOLUTION ORAL DAILY PRN
Status: DISCONTINUED | OUTPATIENT
Start: 2023-04-13 | End: 2023-04-19 | Stop reason: HOSPADM

## 2023-04-13 RX ORDER — BISACODYL 5 MG/1
5 TABLET, DELAYED RELEASE ORAL DAILY PRN
Status: DISCONTINUED | OUTPATIENT
Start: 2023-04-13 | End: 2023-04-19 | Stop reason: HOSPADM

## 2023-04-13 RX ORDER — BISACODYL 10 MG
10 SUPPOSITORY, RECTAL RECTAL DAILY PRN
Status: DISCONTINUED | OUTPATIENT
Start: 2023-04-13 | End: 2023-04-19 | Stop reason: HOSPADM

## 2023-04-13 RX ORDER — AMOXICILLIN 250 MG
2 CAPSULE ORAL 2 TIMES DAILY
Status: DISCONTINUED | OUTPATIENT
Start: 2023-04-13 | End: 2023-04-19 | Stop reason: HOSPADM

## 2023-04-13 RX ADMIN — PANTOPRAZOLE SODIUM 40 MG: 40 INJECTION, POWDER, LYOPHILIZED, FOR SOLUTION INTRAVENOUS at 17:33

## 2023-04-13 RX ADMIN — Medication 10 ML: at 10:04

## 2023-04-13 RX ADMIN — TAZOBACTAM SODIUM AND PIPERACILLIN SODIUM 3.38 G: 375; 3 INJECTION, SOLUTION INTRAVENOUS at 06:23

## 2023-04-13 RX ADMIN — TAMSULOSIN HYDROCHLORIDE 0.4 MG: 0.4 CAPSULE ORAL at 10:02

## 2023-04-13 RX ADMIN — Medication 10 ML: at 20:29

## 2023-04-13 RX ADMIN — CEFTRIAXONE 2 G: 2 INJECTION, POWDER, FOR SOLUTION INTRAMUSCULAR; INTRAVENOUS at 11:14

## 2023-04-13 RX ADMIN — PANTOPRAZOLE SODIUM 40 MG: 40 INJECTION, POWDER, LYOPHILIZED, FOR SOLUTION INTRAVENOUS at 10:02

## 2023-04-13 NOTE — PROGRESS NOTES
[facility] Medicine Services  PROGRESS NOTE    Patient Name: Abdiaziz Enrique Jr.  : 1935  MRN: [id number]    Date of Admission: 2023  Primary Care Physician: System, Provider Not In    Subjective   Subjective     CC:     F/U Klebsiella pneumoniae UTI/Bacteremia     HPI:     Patient seen and examined. Sitting on EOB eating oranges. Denies abdominal pain, N/V/D. No flank pain. No fever     ROS:  Gen - No fevers, chills  CV - No chest pain, palpitations  Resp - No cough, dyspnea  GI - No N/V/D, abd pain    Objective   Objective     Vital Signs:   Temp:  [97.5 °F (36.4 °C)-98.2 °F (36.8 °C)] 97.9 °F (36.6 °C)  Heart Rate:  [64-78] 64  Resp:  [16-18] 18  BP: (118-148)/(53-66) 139/53     Physical Exam:  Constitutional: No acute distress, awake, alert, sitting on EOB  HENT: NCAT, mucous membranes moist  Respiratory: Clear to auscultation bilaterally, respiratory effort normal   Cardiovascular: RRR, no murmurs, rubs, or gallops  Gastrointestinal: Positive bowel sounds, soft, nontender, nondistended  Musculoskeletal: No bilateral ankle edema  Psychiatric: Appropriate affect, cooperative  Neurologic: Oriented x 3, PARKER, speech clear  Skin: No rashes    Results Reviewed:  LAB RESULTS:      Lab 23  0337 23  0332 23  0324 23  2230 23  1657   WBC 13.79*  --  13.88*  --  15.26*   HEMOGLOBIN 13.1  --  13.3  --  15.1   HEMATOCRIT 39.1  --  38.7  --  43.4   PLATELETS 223  --  191  --  212   NEUTROS ABS 11.17*  --   --   --  12.40*   IMMATURE GRANS (ABS) 0.20*  --   --   --  0.08*   LYMPHS ABS 0.91  --   --   --  0.90   MONOS ABS 1.41*  --   --   --  1.82*   EOS ABS 0.06  --   --   --  0.02   MCV 94.0  --  92.1  --  91.2   SED RATE 85*  --  75*  --   --    CRP  --  23.79*  --   --  29.63*   PROCALCITONIN  --   --   --   --  3.61*   LACTATE  --  1.0  --  2.0  --          Lab 23  0332 23  0324 23  1657   SODIUM 136 133* 130*   POTASSIUM 3.9 3.8 3.7    CHLORIDE 104 100 96*   CO2 22.0 20.0* 19.0*   ANION GAP 10.0 13.0 15.0   BUN 46* 53* 59*   CREATININE 1.84* 1.81* 1.99*   EGFR 35.0* 35.7* 31.9*   GLUCOSE 173* 175* 184*   CALCIUM 8.0* 8.1* 8.9   MAGNESIUM  --   --  2.3   HEMOGLOBIN A1C  --   --  6.90*         Lab 04/13/23  0332 04/12/23  0324 04/11/23  1657   TOTAL PROTEIN 5.6* 5.0* 6.9   ALBUMIN 2.6* 2.7* 3.3*   GLOBULIN 3.0 2.3 3.6   ALT (SGPT) 106* 67* 79*   AST (SGOT) 179* 82* 106*   BILIRUBIN 0.7 0.7 0.9   ALK PHOS 223* 165* 198*   LIPASE  --  24  --          Lab 04/11/23  1657   HSTROP T 23*                 Brief Urine Lab Results  (Last result in the past 365 days)      Color   Clarity   Blood   Leuk Est   Nitrite   Protein   CREAT   Urine HCG        04/11/23 1841 Yellow   Cloudy   Large (3+)   Moderate (2+)   Negative   100 mg/dL (2+)                 Microbiology Results Abnormal     Procedure Component Value - Date/Time    Blood Culture - Blood, Arm, Right [[id number]]  (Normal) Collected: 04/11/23 0222    Lab Status: Preliminary result Specimen: Blood from Arm, Right Updated: 04/12/23 2300     Blood Culture No growth at 24 hours          CT Abdomen Pelvis Without Contrast    Result Date: 4/11/2023  CT ABDOMEN PELVIS WO CONTRAST Date of Exam: 4/11/2023 10:34 PM EDT Indication: new UTI in male , microscopic hematuria. Comparison: None available. Technique: Axial CT images were obtained of the abdomen and pelvis without the administration of contrast. Reconstructed coronal and sagittal images were also obtained. Automated exposure control and iterative construction methods were used. Findings: The lung bases are clear. Evaluation of the body wall soft tissues demonstrates no acute or suspicious findings. There is advanced multilevel spondylosis and lumbar dextrocurvature. The liver is homogeneous. Cholelithiasis is present with the gallbladder  otherwise nondistended. Homogeneous spleen. No suspicious focal pancreatic lesion. There is extensive bilateral  nephrolithiasis, without evidence of obstruction on the right. On the left, there is moderate to severe hydronephrosis and a rounded 6 mm stone is present within the mid portion of the left ureter. A large stone is noted in the bladder. There is severe wall edema with adjacent mesenteric stranding present involving the transverse portion of the duodenum, incompletely characterized given lack of IV contrast, with appearance concerning for an adjacent inflamed large duodenal diverticulum, with component of small adjacent abscess suspected, measuring approximately 3 cm on image 56 of the axial series. There is no evidence of associated bowel obstruction. There is no overt pneumoperitoneum. Atherosclerotic abdominal aorta. The pelvic viscera demonstrate no additional acute findings.     Impression: Impression: Bilateral nephrolithiasis is present, with an obstructing stone present in the midportion of the left ureter, with associated left-sided hydronephrosis and nephropathy. There is severe wall edema with adjacent mesenteric stranding present involving the transverse portion of the duodenum, incompletely characterized given lack of IV contrast, with appearance concerning for an adjacent inflamed large duodenal diverticulum,  with possible small adjacent abscess suspected, measuring approximately 3 cm on image 56 of the axial series. There is no evidence of associated bowel obstruction. Consider contrast-enhanced CT to further evaluate. Electronically Signed: Myron Vyas  4/11/2023 11:08 PM EDT  Workstation ID: [id number]    CT Abdomen Pelvis With Contrast    Result Date: 4/12/2023  CT ABDOMEN PELVIS W CONTRAST Date of Exam: 4/12/2023 1:26 PM EDT Indication: Abdominal pain, acute, nonlocalized, recent abdominal CT without contrast demonstrating possible diverticular abscess associated with the duodenum  Comparison: CT abdomen and pelvis without contrast 4/11/2023 Technique: Axial CT images were obtained of the abdomen  and pelvis following the uneventful intravenous administration of 95 mL Isovue 300. Reconstructed coronal and sagittal images were also obtained. Automated exposure control and iterative construction methods were used. Findings: Lung bases without consolidation. The visualized pulmonary arteries are well-opacified with contrast. Heart size normal. Small left pleural effusion stable from prior study with minimal left basilar atelectasis. The liver is normal in size and contour. Gallbladder present with cholelithiasis noted. No pericholecystic inflammation. The adrenal glands are normal. The spleen is normal in size. Scattered areas of fatty replacement of pancreatic parenchyma. At the pancreatic head there is a low-attenuation cystic lesion measuring 3.6 x 2.2 cm. No upstream main pancreatic duct dilatation. Normal caliber common bile duct. Kidneys are symmetric in size. Previously seen 6 mm obstructing calculus in mid left ureter has resolved. There is mild left urothelial hyperemia and thickening at this level the ureter which relate to inflammation. There are 2 nonobstructing calculi at the left kidney measuring 7 mm and 8 mm which are stable. Mild left hydronephrosis and moderate left renal pelvic dilatation and proximal ureteral dilatation slightly decreased from prior study. Multiple nonobstructing right-sided renal calculi noted for  example at the lower pole measuring 16 mm and mid left kidney measuring 14 mm. Duplicated right renal collecting system. No right ureteral calculus. Large calculus dependently in bladder measures 17 mm, unchanged. Prostatomegaly. Negative for pneumoperitoneum. No bowel obstruction. The appendix is normal. There is hyperemia and inflammatory stranding surrounding the second and third portions of the duodenum suggesting duodenitis. No intramural abscess. The portal vein, splenic vein, superior mesenteric vein are patent. Moderate vascular calcifications of the abdominal aorta with  ectasia of infrarenal aorta measuring up to 2.8 x 2.4 cm. Moderate convex right dextrocurvature of the lumbar spine centered at L3. No aggressive osseous lesion or fracture. 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     Impression: Impression: 1. Obstructing calculus in mid left ureter resolved. Mild left hydronephrosis and moderate left proximal ureter dilatation slightly improved. Bilateral nonobstructing nephrolithiasis with large calculus in bladder. 2. Inflammatory stranding surrounding second and third portion duodenum consistent with duodenitis. 3. 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 4. Stable small left pleural effusion. 5. Cholelithiasis, prostatomegaly, and chronic findings above. Electronically Signed: Gurinder Jeffers  4/12/2023 2:30 PM EDT  Workstation ID: [id number]    XR Chest 1 View    Result Date: 4/11/2023  XR CHEST 1 VW Date of Exam: 4/11/2023 5:22 PM EDT Indication: Weak/Dizzy/AMS triage protocol. Comparison: None available. FINDINGS: Mild chronic changes of the lung fields are present without focal airspace opacity. There is no significant pleural effusion or distinct pneumothorax. Normal heart and mediastinal contours.     Impression: Mild chronic changes of the lung fields without evidence of acute cardiopulmonary abnormality. Electronically Signed: Myron Vyas  4/11/2023 6:10 PM EDT  Workstation ID: [id number]    CT Head Without Contrast Stroke Protocol    Result Date: 4/11/2023  CT HEAD WO CONTRAST STROKE PROTOCOL Date of Exam: 4/11/2023 10:31 PM EDT Indication: aMS, ATAXIA. Comparison: None available. Technique: Axial CT images were obtained of the head without contrast administration.  Reconstructed coronal and sagittal images were also obtained. Automated exposure control and iterative construction methods were used. FINDINGS:  Gray-white differentiation is maintained and there is no evidence of intracranial hemorrhage, mass or mass effect. Age-related changes of the brain are present including volume loss and typical periventricular sequela of chronic small vessel ischemia. There is otherwise no evidence of intracranial hemorrhage, mass or mass effect. The ventricles are normal in size and configuration accounting for surrounding volume loss. The orbits are normal and the paranasal sinuses are grossly clear.     Impression: Age-related changes of the brain as above, otherwise without evidence of acute intracranial abnormality.  Electronically Signed: Myron Vyas  4/11/2023 10:56 PM EDT  Workstation ID: [id number]          Current medications:  Scheduled Meds:cefTRIAXone, 2 g, Intravenous, Q24H  pantoprazole, 40 mg, Intravenous, BID AC  senna-docusate sodium, 2 tablet, Oral, BID  sodium chloride, 10 mL, Intravenous, Q12H  tamsulosin, 0.4 mg, Oral, Daily      Continuous Infusions:   PRN Meds:.•  senna-docusate sodium **AND** polyethylene glycol **AND** bisacodyl **AND** bisacodyl  •  melatonin  •  ondansetron  •  sodium chloride  •  sodium chloride  •  sodium chloride    Assessment & Plan   Assessment & Plan     Active Hospital Problems    Diagnosis  POA   • **Generalized weakness [R53.1]  Yes   • Acute UTI [N39.0]  Yes   • Elevated serum creatinine [R79.89]  Yes   • Elevated liver enzymes [R74.8]  Yes   • Hypertension [I10]  Yes   • Gout [M10.9]  Yes   • BPH (benign prostatic hyperplasia) [N40.0]  Yes   • Left obsructing nephrolithiasis [N20.0]  Yes      Resolved Hospital Problems   No resolved problems to display.        Brief Hospital Course to date:  Abdiaziz Enrique Jr. is a 87 y.o. male with history of BPH, hypertension, bladder stone, gout who presented to the ER with Generalized Weakness and Mild Confusion. Pt found to have UTI. Also + blood cultures.    This patient's problems and plans were partially entered by my partner and  updated as appropriate by me 04/13/23.  All problems are new to me today     Klebsiella pneumoniae UTI and Bacteremia   Large Bladder Calculus   -Change IV ABX to Rocephin 2g IV daily  -ID consulted  -CT scan shows resolution of obstructing calculus in L ureter. Mild hydro and large calculus in bladder  -Urology consulted on admission, awaiting further recommendations      Duodenitis   Pancreatic Cystic Lesion  Cholelithiasis   Elevated LFTs   -Obtain MRCP  -General Surgery saw due to concern for possible duodenal abscess. No surgery indicated  -IV PPI BID   -MRCP pending  -Consult to GI   -Hepatitis panel negative   -CMP in AM      Elevated Serum Creatinine  - Unknown baseline  - CT with obstructing calculus resolved, hydro improved   - S/P IVF  - BMP in AM      HTN  - HCTZ held      Expected Discharge Location and Transportation:  home  Expected Discharge   Expected Discharge Date and Time     Expected Discharge Date Expected Discharge Time    Apr 19, 2023            DVT prophylaxis:  Mechanical DVT prophylaxis orders are present.     AM-PAC 6 Clicks Score (PT): 19 (04/12/23 1438)    CODE STATUS:   Code Status and Medical Interventions:   Ordered at: 04/11/23 4306     Level Of Support Discussed With:    Patient     Code Status (Patient has no pulse and is not breathing):    CPR (Attempt to Resuscitate)     Medical Interventions (Patient has pulse or is breathing):    Full Support       Tracy Brown, DO  04/13/23

## 2023-04-13 NOTE — CONSULTS
[facility]    Referring Provider: No ref. provider found    PCP: System, Provider Not In    Reason for Consultation: Pancreatic cyst, duodenitis, elevated LFTs    History of present illness:    Abdiaziz Enrique Jr. is a 87 y.o. male, PMH includes HTN, gout, BPH, is admitted via ED yesterday for evaluation of confusion, weakness, UTI. Daughter is at bedside and helps provide the history.     Labs at time of admission significant for WBC 15.26, Hb 15.1, Hct 43.4, plt 212, total bili 0.9, , ALT 79, alk phos 198, BUN 59, Cr 1.99, CRP 29.63, lactate 2.0. Acute hepatitis panel negative.     CT A/P w/ contrast 4/12: Obstructing calculus in the mid left ureter resolved. Mild left hydronephrosis and left proximal ureter dilation improved. Inflammatory stranding surrounding second and third portion of duodenum consistent with duodenitis. Cystic lesion measuring 3.6 x 2.2 cm involving pancreatic head likely primary cystic pancreatic lesion such as IPMN. Cholelithiasis.     Pt c/o overall fatigue, decreased appetite, weakness over the last week or so. At this time, pt denies any fever, chills, abdominal pain, indigestion, nausea, vomiting, diarrhea, constipation, hematemesis, dysphagia, hematochezia, melena, bloating, weight loss or gain, dysuria, jaundice or bruising. He denies recent sick contacts, recent medication changes.     Patient denies personal or FHx of PUD, H Pylori, gastritis, pancreatitis, colitis, Celiac disease, UC, Crohn's disease, IBS, colon or gastric cancers. 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 Pt denies illicit substance or NSAID use. Uses nicotine oral pouches daily.     No previous EGD. CSY  8 years ago, unrevealing. Personal h/o colon polyps.     Allergies:  Patient has no known allergies.    Scheduled Meds:  cefTRIAXone, 2 g, Intravenous, Q24H  pantoprazole, 40 mg, Intravenous, BID AC  senna-docusate sodium, 2 tablet, Oral, BID  sodium chloride, 10 mL, Intravenous,  Q12H  tamsulosin, 0.4 mg, Oral, Daily         Infusions:       PRN Meds:  •  senna-docusate sodium **AND** polyethylene glycol **AND** bisacodyl **AND** bisacodyl  •  melatonin  •  ondansetron  •  sodium chloride  •  sodium chloride  •  sodium chloride    Home Meds:  Medications Prior to Admission   Medication Sig Dispense Refill Last Dose   • amLODIPine (NORVASC) 10 MG tablet Take 1 tablet by mouth Daily.      • amLODIPine (NORVASC) 5 MG tablet Take 1 tablet by mouth Daily.      • colchicine 0.6 MG tablet Take 1 tablet by mouth.      • finasteride (PROSCAR) 5 MG tablet Take 1 tablet by mouth Daily.      • fluticasone (FLONASE) 50 MCG/ACT nasal spray 2 sprays into the nostril(s) as directed by provider Daily.      • hydroCHLOROthiazide (MICROZIDE) 12.5 MG capsule Take 1 capsule by mouth.      • losartan (COZAAR) 50 MG tablet Take 2 tablets by mouth Daily.      • metFORMIN (GLUCOPHAGE) 500 MG tablet Take 1 tablet by mouth 2 (Two) Times a Day With Meals.      • mupirocin (BACTROBAN) 2 % ointment Apply 1 application topically to the appropriate area as directed 3 (Three) Times a Day.      • tamsulosin (FLOMAX) 0.4 MG capsule 24 hr capsule Take 1 capsule by mouth Every Night.          ROS: Review of Systems   Constitutional: Positive for appetite change and fatigue. Negative for chills, diaphoresis and unexpected weight change.   HENT: Negative for drooling, facial swelling, nosebleeds, rhinorrhea, sore throat, tinnitus and trouble swallowing.    Respiratory: Negative for cough, chest tightness and shortness of breath.    Cardiovascular: Negative for chest pain and palpitations.   Gastrointestinal: Negative for abdominal pain, blood in stool, constipation, diarrhea, nausea and vomiting.   Genitourinary: Negative for dysuria, flank pain and hematuria.   Musculoskeletal: Negative for arthralgias, myalgias and neck pain.   Skin: Negative for color change and pallor.   Neurological: Positive for weakness. Negative for  "tremors, syncope, numbness and headaches.   Hematological: Does not bruise/bleed easily.   Psychiatric/Behavioral: Negative for confusion and hallucinations. The patient is not nervous/anxious.    All other systems reviewed and are negative.      PAST MED HX:  Past Medical History:   Diagnosis Date   • Bladder stone    • BPH (benign prostatic hyperplasia) 04/11/2023   • Gout 04/11/2023   • Hypertension 04/11/2023       PAST SURG HX:  Past Surgical History:   Procedure Laterality Date   • CYSTOSCOPY     • CYSTOSCOPY W/ URETERAL STENT PLACEMENT     • TONSILLECTOMY         FAM HX:  Family History   Problem Relation Age of Onset   • COPD Mother    • Dementia Mother    • Alcohol abuse Father    • Cerebral aneurysm Father        SOC HX:  Social History     Socioeconomic History   • Marital status:    Tobacco Use   • Smoking status: Former     Packs/day: 1.00     Years: 30.00     Pack years: 30.00     Types: Cigarettes       PHYSICAL EXAM  /53 (BP Location: Right arm, Patient Position: Lying)   Pulse 64   Temp 97.9 °F (36.6 °C) (Oral)   Resp 18   Ht 172.7 cm (68\")   Wt 81.6 kg (180 lb)   SpO2 (!) 89%   BMI 27.37 kg/m²   Wt Readings from Last 3 Encounters:   04/11/23 81.6 kg (180 lb)   ,body mass index is 27.37 kg/m².  Physical Exam  Vitals and nursing note reviewed.   Constitutional:       General: He is not in acute distress.     Appearance: Normal appearance. He is not ill-appearing or diaphoretic.   HENT:      Head: Normocephalic and atraumatic.      Right Ear: External ear normal.      Left Ear: External ear normal.      Nose: Nose normal.      Mouth/Throat:      Mouth: Mucous membranes are moist.      Pharynx: Oropharynx is clear.   Eyes:      Conjunctiva/sclera: Conjunctivae normal.      Pupils: Pupils are equal, round, and reactive to light.   Cardiovascular:      Rate and Rhythm: Normal rate and regular rhythm.      Pulses: Normal pulses.      Heart sounds: Normal heart sounds.   Pulmonary:      " Effort: Pulmonary effort is normal.      Breath sounds: Normal breath sounds.   Abdominal:      General: Abdomen is flat. There is no distension.      Tenderness: There is no abdominal tenderness. There is no guarding or rebound.   Musculoskeletal:         General: Normal range of motion.      Cervical back: Normal range of motion.      Right lower leg: No edema.      Left lower leg: No edema.   Skin:     General: Skin is warm and dry.      Coloration: Skin is not jaundiced or pale.   Neurological:      Mental Status: He is alert and oriented to person, place, and time. Mental status is at baseline.   Psychiatric:         Mood and Affect: Mood normal.         Results Review:   I reviewed the patient's new clinical results.  I reviewed the patient's new imaging results and agree with the interpretation.  I reviewed the patient's other test results and agree with the interpretation    Lab Results   Component Value Date    WBC 13.79 (H) 04/13/2023    HGB 13.1 04/13/2023    HGB 13.3 04/12/2023    HGB 15.1 04/11/2023    HCT 39.1 04/13/2023    MCV 94.0 04/13/2023     04/13/2023       No results found for: INR    Lab Results   Component Value Date    GLUCOSE 173 (H) 04/13/2023    BUN 46 (H) 04/13/2023    CREATININE 1.84 (H) 04/13/2023    BCR 25.0 04/13/2023     04/13/2023    K 3.9 04/13/2023    CO2 22.0 04/13/2023    CALCIUM 8.0 (L) 04/13/2023    ALBUMIN 2.6 (L) 04/13/2023    ALKPHOS 223 (H) 04/13/2023    BILITOT 0.7 04/13/2023     (H) 04/13/2023     (H) 04/13/2023       ASSESSMENTS/PLANS    Duodenitis via CT  Pancreatic cyst  Elevated LFTs  Acute UTI  Confusion, resolved   - MRCP ordered, not yet obtained; will determine interval for repeat imaging to follow pancreatic cyst pending results   - discussed role of EUS to further evaluate pancreatic cyst, which could be considered in outpatient setting if pt wishes to proceed with such   - continue trending LFTs   - continue pantoprazole 40mg IV  BID   - NPO at midnight, plan for EGD tomorrow with Dr. Brunner    I discussed the patient's findings and my recommendations with patient and family. Thank you very kindly for this consultation. Will continue to follow during this hospitalization.      Renee Lewis PA-C  04/13/23  13:26 EDT

## 2023-04-13 NOTE — PLAN OF CARE
Goal Outcome Evaluation:                 Problem: Fall Injury Risk  Goal: Absence of Fall and Fall-Related Injury  Intervention: Identify and Manage Contributors  Recent Flowsheet Documentation  Taken 4/13/2023 0200 by Pascual Maldonado RN  Medication Review/Management: medications reviewed  Taken 4/13/2023 0000 by Pascual Maldonado RN  Medication Review/Management: medications reviewed  Taken 4/12/2023 2200 by Pascual Maldonado RN  Medication Review/Management: medications reviewed  Taken 4/12/2023 2000 by Pascual Maldonado RN  Medication Review/Management: medications reviewed  Intervention: Promote Injury-Free Environment  Recent Flowsheet Documentation  Taken 4/13/2023 0200 by Pascual Maldonado RN  Safety Promotion/Fall Prevention:   activity supervised   toileting scheduled  Taken 4/13/2023 0000 by Pascual Maldonado RN  Safety Promotion/Fall Prevention:   activity supervised   safety round/check completed   toileting scheduled  Taken 4/12/2023 2200 by Pascual Maldonado RN  Safety Promotion/Fall Prevention:   activity supervised   safety round/check completed   toileting scheduled  Taken 4/12/2023 2000 by Pascual Maldonado RN  Safety Promotion/Fall Prevention:   activity supervised   safety round/check completed   toileting scheduled     VSS, voids well, rested throughout the night, pain managed with PRN medications, will continue to monitor for changes.

## 2023-04-13 NOTE — CONSULTS
[facility]   Consult Note    Patient Name: Abdiaziz Enrique Jr.  : 1935  MRN: [id number]  Primary Care Physician:  System, Provider Not In  Referring Physician: No ref. provider found  Date of admission: 2023    Consults  Subjective   Subjective     Reason for Consult/ Chief Complaint: Urolithiasis    History of Present Illness  Abdiaziz Enrique Jr. is a 87 y.o. male patient admitted yesterday with some mental status changes and confusion with weakness.  He appeared to have a urinary infection.  CT scan of the abdomen pelvis showed stone at his left ureteropelvic junction with possible obstruction.  He also had 2 additional stones in his left kidney nonobstructing.  He was also noted to have a fairly large bladder stone.  This has been known to be present for several years apparently in talking to the patient as well as his daughter.  [facility] but had postponed that.  He has been afebrile his white count was slightly elevated he was placed on empiric antibiotics.  His urinalysis suggested infection but was nitrite negative.  Urine culture is pending.  He had a repeat CT scan with contrast today abdomen and pelvis.  The previous ureteral stone is completely absent and the ureter is slightly more dilated to the level of the bladder.  There is no evidence of ureteral stone in both left-sided renal stones are still present.  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  It appears that the stone likely is passed into the bladder.  He has never had actual flank pain during this episode.  I suggest no acute urologic intervention other than awaiting his urine cultures.  He will be placed on appropriate antibiotics and consider cystoscopy with cystolitholopaxy.  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  I will follow along during his hospitalization.    Review of Systems     Personal History      Past Medical History:   Diagnosis Date   • Bladder stone    • BPH (benign prostatic hyperplasia) 04/11/2023   • Gout 04/11/2023   • Hypertension 04/11/2023       Past Surgical History:   Procedure Laterality Date   • CYSTOSCOPY     • CYSTOSCOPY W/ URETERAL STENT PLACEMENT     • TONSILLECTOMY         Family History: family history includes Alcohol abuse in his father; COPD in his mother; Cerebral aneurysm in his father; Dementia in his mother. Otherwise pertinent FHx was reviewed and not pertinent to current issue.    Social History:  reports that he has quit smoking. His smoking use included cigarettes. He has a 30.00 pack-year smoking history. He does not have any smokeless tobacco history on file.    Home Medications:   amLODIPine, colchicine, finasteride, fluticasone, hydroCHLOROthiazide, losartan, metFORMIN, mupirocin, and tamsulosin    Allergies:  No Known Allergies    Objective    Objective     Vitals:  Temp:  [97.7 °F (36.5 °C)-100.5 °F (38.1 °C)] 98.2 °F (36.8 °C)  Heart Rate:  [67-87] 74  Resp:  [14-18] 16  BP: (118-164)/(58-75) 118/58    Physical Exam    Result Review    Result Review:  I have personally reviewed the results from the time of this admission to 4/12/2023 20:33 EDT and agree with these findings:  [x]  Laboratory list / accordion  []  Microbiology  [x]  Radiology  []  EKG/Telemetry   []  Cardiology/Vascular   []  Pathology  []  Old records  []  Other:  Most notable findings include:       Assessment & Plan   Assessment / Plan     Brief Patient Summary:  Abdiaziz Enrique Jr. is a 87 y.o. male who had a obstructing stone proximal left ureter upon admission which seems to have passed into the bladder.  Urine cultures are pending.  Plan as above.  Active Hospital Problems:  Active Hospital Problems    Diagnosis    • **Generalized weakness    • Acute UTI    • Elevated serum creatinine    • Elevated liver enzymes    • Hypertension    • Gout    • BPH (benign prostatic hyperplasia)    • Left  obsructing nephrolithiasis      Plan:       Ricardo Schofield MD

## 2023-04-13 NOTE — CONSULTS
General Surgery Consultation Note    Date of Service: 4/13/2023  Abdiaziz Enrique Jr.  [id number]  1935      Referring Provider: Tracy Brown DO    Location of Consult: Inpatient     Reason for Consultation: Duodenitis, pancreatic cystic lesion       History of Present Illness:  I am seeing, Abdiaziz Enrique Jr., in consultation at request of Tracy Brown DO regarding duodenitis and pancreatic cystic lesion.  He is an 87-year-old gentleman with history of hypertension, BPH, and gout with known nephrolithiasis and bladder stone who presented to [facility] 2 days ago with altered mental status and weakness.  Patient reports that he was not feeling well and ultimately that was what led him to present to our emergency department.  He denies any abdominal pain, and denies any abdominal pain since admission.  He reports he did have some cramping yesterday but this is now resolved.  He was able to tolerate a regular diet yesterday without any difficulty and had Jell-O, carrots, and some sort of protein.  He denies any nausea or vomiting.  He tolerated diet well.  His last bowel movement was 2 days ago.  He reports he passed flatus yesterday.  He denies any known history of pancreatitis or known pancreatic disease.  No known history of liver disease.  He has never had any prior abdominal surgeries other than urologic interventions for stones.  He is otherwise unable to provide much history.  He provides no history of known malignancy.  On admission he was found to have evidence of acute urinary tract infection as well as nephrolithiasis and urology was consulted.  There was concern on his noncontrast scan for possible periduodenal collection, and a repeat CT scan was performed yesterday which was more concerning for a pancreatic cystic lesion as well as duodenitis.  I was therefore asked to evaluate the patient..      Problems Addressed this Visit        Gastrointestinal Abdominal      Elevated liver enzymes       Genitourinary and Reproductive     Acute UTI    Relevant Medications    piperacillin-tazobactam (ZOSYN) 3.375 g in iso-osmotic dextrose 50 ml (premix) (Completed)    piperacillin-tazobactam (ZOSYN) 3.375 g in iso-osmotic dextrose 50 ml (premix)       Symptoms and Signs    * (Principal) Generalized weakness - Primary   Other Visit Diagnoses     Acute kidney injury        Relevant Medications    hydroCHLOROthiazide (MICROZIDE) 12.5 MG capsule    Hyponatremia        Confusion          Diagnoses       Codes Comments    Generalized weakness    -  Primary ICD-10-CM: R53.1  ICD-9-CM: 780.79     Acute kidney injury     ICD-10-CM: N17.9  ICD-9-CM: 584.9     Hyponatremia     ICD-10-CM: E87.1  ICD-9-CM: 276.1     Confusion     ICD-10-CM: R41.0  ICD-9-CM: 298.9     Acute UTI     ICD-10-CM: N39.0  ICD-9-CM: 599.0     Elevated liver enzymes     ICD-10-CM: R74.8  ICD-9-CM: 790.5           PMHx:  Past Medical History:   Diagnosis Date   • Bladder stone    • BPH (benign prostatic hyperplasia) 04/11/2023   • Gout 04/11/2023   • Hypertension 04/11/2023       Past Surgical History:  Past Surgical History:   • CYSTOSCOPY   • CYSTOSCOPY W/ URETERAL STENT PLACEMENT   • TONSILLECTOMY       Allergies:  No Known Allergies    Medications:  No current facility-administered medications on file prior to encounter.     Current Outpatient Medications on File Prior to Encounter   Medication Sig Dispense Refill   • amLODIPine (NORVASC) 10 MG tablet Take 1 tablet by mouth Daily.     • amLODIPine (NORVASC) 5 MG tablet Take 1 tablet by mouth Daily.     • colchicine 0.6 MG tablet Take 1 tablet by mouth.     • finasteride (PROSCAR) 5 MG tablet Take 1 tablet by mouth Daily.     • fluticasone (FLONASE) 50 MCG/ACT nasal spray 2 sprays into the nostril(s) as directed by provider Daily.     • hydroCHLOROthiazide (MICROZIDE) 12.5 MG capsule Take 1 capsule by mouth.     • losartan (COZAAR) 50 MG tablet Take 2 tablets by mouth Daily.      • metFORMIN (GLUCOPHAGE) 500 MG tablet Take 1 tablet by mouth 2 (Two) Times a Day With Meals.     • mupirocin (BACTROBAN) 2 % ointment Apply 1 application topically to the appropriate area as directed 3 (Three) Times a Day.     • tamsulosin (FLOMAX) 0.4 MG capsule 24 hr capsule Take 1 capsule by mouth Every Night.           Current Facility-Administered Medications:   •  melatonin tablet 5 mg, 5 mg, Oral, Nightly PRN, Vahe Rendon PA-C  •  ondansetron (ZOFRAN) injection 4 mg, 4 mg, Intravenous, Q6H PRN, Vahe Rendon PA-C  •  pantoprazole (PROTONIX) injection 40 mg, 40 mg, Intravenous, BID Carlos RIOS Gustavo V, MD  •  piperacillin-tazobactam (ZOSYN) 3.375 g in iso-osmotic dextrose 50 ml (premix), 3.375 g, Intravenous, Q8H, Bridgette Hernadez, DO, 3.375 g at 04/13/23 0623  •  sodium chloride 0.9 % flush 10 mL, 10 mL, Intravenous, PRN, BroadwayOzzy orlando, DO  •  sodium chloride 0.9 % flush 10 mL, 10 mL, Intravenous, Q12H, Vahe Rendon PA-C  •  sodium chloride 0.9 % flush 10 mL, 10 mL, Intravenous, PRN, Vahe Rendon PA-C  •  sodium chloride 0.9 % infusion 40 mL, 40 mL, Intravenous, PRN, Vahe Rendon, PA-C  •  tamsulosin (FLOMAX) 24 hr capsule 0.4 mg, 0.4 mg, Oral, Daily, Vahe Rendon PA-C, 0.4 mg at 04/12/23 0016      Family History:  Family History   Problem Relation Age of Onset   • COPD Mother    • Dementia Mother    • Alcohol abuse Father    • Cerebral aneurysm Father        Social History: Pt lives in [place].    Tobacco use: Denies     EtOH use : Denies    Illicit drug use: Denies       Review of Systems:   Constitutional: No fevers, chills or malaise   Eyes: Denies visual changes    Cardiovascular: Denies chest pain, palpitations   Pulmonary: Denies cough or shortness of breath   Abdominal/ GI: See HPI    Genitourinary: Denies dysuria or hematuria   Musculoskeletal: Denies any but chronic joint aches, pains or deformities   Psychiatric: No recent mood changes   Neurologic: No  "paresthesias or loss of function    /58 (BP Location: Right arm, Patient Position: Lying)   Pulse 70   Temp 97.5 °F (36.4 °C) (Oral)   Resp 18   Ht 172.7 cm (68\")   Wt 81.6 kg (180 lb)   SpO2 (!) 89%   BMI 27.37 kg/m²   Body mass index is 27.37 kg/m².    Gen: Awake, resting in bed, no obvious distress, elderly but answers questions appropriately, appears comfortable  Head: Normocephalic, atraumatic.   Eyes: Pupils equal, round, react to light and accommodation.   Mouth: Oral mucosa without lesions,   Neck: No masses, lymphadenopathy or carotid bruits bilaterally   CV: Rhythm and rate regular, no murmurs, rubs or gallops  Lungs: Clear to auscultation bilaterally, not labored on room air   Abdomen: No obvious scars, not distended, no significant tenderness to light or deep palpation throughout the abdomen  Groin : No obvious hernias bilaterally   Extremities:  No cyanosis, clubbing or edema bilaterally  Lymphatics: No abnormal lymphadenopathy appreciated   Neurologic: No gross deficits         CBC  Results from last 7 days   Lab Units 04/13/23  0337   WBC 10*3/mm3 13.79*   HEMOGLOBIN g/dL 13.1   HEMATOCRIT % 39.1   PLATELETS 10*3/mm3 223       CMP  Results from last 7 days   Lab Units 04/13/23  0332   SODIUM mmol/L 136   POTASSIUM mmol/L 3.9   CHLORIDE mmol/L 104   CO2 mmol/L 22.0   BUN mg/dL 46*   CREATININE mg/dL 1.84*   CALCIUM mg/dL 8.0*   BILIRUBIN mg/dL 0.7   ALK PHOS U/L 223*   ALT (SGPT) U/L 106*   AST (SGOT) U/L 179*   GLUCOSE mg/dL 173*       Radiology  Imaging Results (Last 72 Hours)     Procedure Component Value Units Date/Time    CT Abdomen Pelvis With Contrast [[id number]] Collected: 04/12/23 1407     Updated: 04/12/23 1434    Narrative:      CT ABDOMEN PELVIS W CONTRAST    Date of Exam: 4/12/2023 1:26 PM EDT    Indication: Abdominal pain, acute, nonlocalized, recent abdominal CT without contrast demonstrating possible diverticular abscess associated with the duodenum     Comparison: CT " abdomen and pelvis without contrast 4/11/2023    Technique: Axial CT images were obtained of the abdomen and pelvis following the uneventful intravenous administration of 95 mL Isovue 300. Reconstructed coronal and sagittal images were also obtained. Automated exposure control and iterative   construction methods were used.    Findings:  Lung bases without consolidation. The visualized pulmonary arteries are well-opacified with contrast. Heart size normal. Small left pleural effusion stable from prior study with minimal left basilar atelectasis.    The liver is normal in size and contour. Gallbladder present with cholelithiasis noted. No pericholecystic inflammation. The adrenal glands are normal. The spleen is normal in size. Scattered areas of fatty replacement of pancreatic parenchyma. At the   pancreatic head there is a low-attenuation cystic lesion measuring 3.6 x 2.2 cm. No upstream main pancreatic duct dilatation. Normal caliber common bile duct.    Kidneys are symmetric in size. Previously seen 6 mm obstructing calculus in mid left ureter has resolved. There is mild left urothelial hyperemia and thickening at this level the ureter which relate to inflammation. There are 2 nonobstructing calculi at   the left kidney measuring 7 mm and 8 mm which are stable. Mild left hydronephrosis and moderate left renal pelvic dilatation and proximal ureteral dilatation slightly decreased from prior study. Multiple nonobstructing right-sided renal calculi noted for   example at the lower pole measuring 16 mm and mid left kidney measuring 14 mm. Duplicated right renal collecting system. No right ureteral calculus. Large calculus dependently in bladder measures 17 mm, unchanged. Prostatomegaly.    Negative for pneumoperitoneum. No bowel obstruction. The appendix is normal. There is hyperemia and inflammatory stranding surrounding the second and third portions of the duodenum suggesting duodenitis. No intramural abscess.      The portal vein, splenic vein, superior mesenteric vein are patent. Moderate vascular calcifications of the abdominal aorta with ectasia of infrarenal aorta measuring up to 2.8 x 2.4 cm. Moderate convex right dextrocurvature of the lumbar spine centered   at L3. No aggressive osseous lesion or fracture. 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      Impression:      Impression:  1. Obstructing calculus in mid left ureter resolved. Mild left hydronephrosis and moderate left proximal ureter dilatation slightly improved. Bilateral nonobstructing nephrolithiasis with large calculus in bladder.  2. Inflammatory stranding surrounding second and third portion duodenum consistent with duodenitis.  3. 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  4. Stable small left pleural effusion.  5. Cholelithiasis, prostatomegaly, and chronic findings above.    Electronically Signed: Gurinder Jeffers    4/12/2023 2:30 PM EDT    Workstation ID: [id number]    CT Abdomen Pelvis Without Contrast [[id number]] Collected: 04/11/23 2302     Updated: 04/11/23 2311    Narrative:      CT ABDOMEN PELVIS WO CONTRAST    Date of Exam: 4/11/2023 10:34 PM EDT    Indication: new UTI in male , microscopic hematuria.    Comparison: None available.    Technique: Axial CT images were obtained of the abdomen and pelvis without the administration of contrast. Reconstructed coronal and sagittal images were also obtained. Automated exposure control and iterative construction methods were used.    Findings:  The lung bases are clear. Evaluation of the body wall soft tissues demonstrates no acute or suspicious findings. There is advanced multilevel spondylosis and lumbar dextrocurvature. The liver is homogeneous. Cholelithiasis is present with the gallbladder   otherwise nondistended. Homogeneous spleen. No  suspicious focal pancreatic lesion. There is extensive bilateral nephrolithiasis, without evidence of obstruction on the right. On the left, there is moderate to severe hydronephrosis and a rounded 6 mm   stone is present within the mid portion of the left ureter. A large stone is noted in the bladder. There is severe wall edema with adjacent mesenteric stranding present involving the transverse portion of the duodenum, incompletely characterized given   lack of IV contrast, with appearance concerning for an adjacent inflamed large duodenal diverticulum, with component of small adjacent abscess suspected, measuring approximately 3 cm on image 56 of the axial series. There is no evidence of associated   bowel obstruction. There is no overt pneumoperitoneum. Atherosclerotic abdominal aorta. The pelvic viscera demonstrate no additional acute findings.      Impression:      Impression:  Bilateral nephrolithiasis is present, with an obstructing stone present in the midportion of the left ureter, with associated left-sided hydronephrosis and nephropathy.    There is severe wall edema with adjacent mesenteric stranding present involving the transverse portion of the duodenum, incompletely characterized given lack of IV contrast, with appearance concerning for an adjacent inflamed large duodenal diverticulum,   with possible small adjacent abscess suspected, measuring approximately 3 cm on image 56 of the axial series. There is no evidence of associated bowel obstruction. Consider contrast-enhanced CT to further evaluate.      Electronically Signed: Myron Vyas    4/11/2023 11:08 PM EDT    Workstation ID: [id number]    CT Head Without Contrast Stroke Protocol [[id number]] Collected: 04/11/23 2255     Updated: 04/11/23 2259    Narrative:      CT HEAD WO CONTRAST STROKE PROTOCOL    Date of Exam: 4/11/2023 10:31 PM EDT    Indication: aMS, ATAXIA.    Comparison: None available.    Technique: Axial CT images were obtained of the  head without contrast administration.  Reconstructed coronal and sagittal images were also obtained. Automated exposure control and iterative construction methods were used.    FINDINGS: Gray-white differentiation is maintained and there is no evidence of intracranial hemorrhage, mass or mass effect. Age-related changes of the brain are present including volume loss and typical periventricular sequela of chronic small vessel   ischemia. There is otherwise no evidence of intracranial hemorrhage, mass or mass effect. The ventricles are normal in size and configuration accounting for surrounding volume loss. The orbits are normal and the paranasal sinuses are grossly clear.      Impression:      Age-related changes of the brain as above, otherwise without evidence of acute intracranial abnormality.       Electronically Signed: Myron Vyas    4/11/2023 10:56 PM EDT    Workstation ID: [id number]    XR Chest 1 View [[id number]] Collected: 04/11/23 1810     Updated: 04/11/23 1813    Narrative:      XR CHEST 1 VW    Date of Exam: 4/11/2023 5:22 PM EDT    Indication: Weak/Dizzy/AMS triage protocol.    Comparison: None available.    FINDINGS: Mild chronic changes of the lung fields are present without focal airspace opacity. There is no significant pleural effusion or distinct pneumothorax. Normal heart and mediastinal contours.       Impression:      Mild chronic changes of the lung fields without evidence of acute cardiopulmonary abnormality.     Electronically Signed: Myron Vyas    4/11/2023 6:10 PM EDT    Workstation ID: [id number]              Results Review: I have personally reviewed all of the recent lab and imaging results available at this time.     Vital signs are stable.  Tmax was 100.5 yesterday morning    Labs demonstrate a white blood cell count of 13.  Hemoglobin is 13.1.  Platelets 223.  Creatinine is 1.8.  There is mild elevation of his transaminases with an ALT of 179, AST of 106, alk phos of 223.   Bilirubin is normal at 0.7.    I have personally reviewed a noncontrast CT of the abdomen and pelvis from 2 days ago as well as a contrast scan that was performed yesterday.  This appears to show some mild duodenitis with no obvious evidence of perforation or associated abscess.  There does appear to be a slightly greater than 3 cm cystic lesion in the head of the pancreas.  This appears more consistent with a cystic lesion and there is no air within this collection and appears separate from the duodenum and part of the pancreatic parenchyma.  There are gallstones within the gallbladder but no obvious evidence of pericholecystic stranding or gallbladder wall thickening    Assessment:  Mr. Enrique is an 87-year-old gentleman with history of hypertension, BPH, and gout who was admitted with acute urinary tract infection who I was asked to evaluate due to concern for duodenitis and pancreatic cystic lesion.  His imaging findings appear more consistent with a pancreatic cystic lesion in the head of the pancreas then any sort of definite abscess or duodenal perforation.  There is no acute surgical intervention that I would recommend for this.  Would recommend treatment of his acute urinary tract infection.  Would recommend to continue with regular diet.  Start twice daily IV PPI and test for H. pylori to treat for duodenitis.  He should undergo an MRI to assess for communication with the main pancreatic duct of the cystic lesion, however this could be performed as an outpatient, and then referral to either GI for consideration of EUS versus referral to hepatobiliary surgery to discuss further treatment options.  However given his age he would likely not be a candidate for resection    Plan:  -No surgical interventions from my standpoint  -Would recommend twice daily IV PPI and test for H. Pylori as supportive treatment for duodenitis  -Recommend not urgent MRI to evaluate for main pancreatic duct communication with  the cystic lesion and then referral to either GI for EUS versus hepatobiliary surgery for definitive management    I discussed the patient's findings and my recommendations with the patient and/or family, as well as the primary team     Guevara Gonzalez MD  04/13/23  07:03 EDT      Part of this note may be an electronic transcription/translation of spoken language to printed text using the Dragon Dictation System.

## 2023-04-13 NOTE — CONSULTS
Abdiaziz Enrique Jr.  1935  [id number]    Date of Consult: 4/13/2023    Admit Date:  4/11/2023      Requesting Provider: No ref. provider found  Evaluating Physician: Justice Mcmillan MD    Chief Complaint: fatigue    Reason for Consultation: bactermic urinary infection    History of present illness:     Patient is a 87 y.o.  Yr old male not a great historian with respect to past medical care, daughter is present to give information, with history of renal calculi/bladder calculi and prior care at the [facility], has had cystoscopy previously and a prior stent although the specifics of that are not clear.  Daughter had been out of town, reports returning to town and finding her dad confused, lethargic and poor p.o. intake with dysuria; diagnosed with acute UTI and abnormal CT scan with concern for obstructing stone at the left ureter/bilateral nephrolithiasis in addition to severe wall edema involving the duodenum and possibility for large inflamed duodenal diverticulum and possible adjacent abscess on April 11.  Repeat CT scan April 12 with obstructing calculus in the mid left ureter resolved, mild left hydronephrosis/moderate left proximal ureteral dilation improved per radiology with large calculus in the bladder.  Duodenitis noted with cystic lesion in the pancreatic head but no other description of abscess.  Surgery has seen him.    Recent dysuria improved.  Denies any current abdominal pain.  Appetite diminished but denies overt vomiting/diarrhea and no hematochezia melena or hematemesis.  He has pure wick in place has had previous urinary urgency/frequency but denies having to strain.  No hematuria or pyuria.  Currently no flank pain    Daughter reports that his mental status has improved.  Back to baseline.  No headache photophobia or neck stiffness.  No shortness of breath cough or hemoptysis.  No skin rash      Past Medical History:   Diagnosis Date   • Bladder stone    • BPH (benign  prostatic hyperplasia) 04/11/2023   • Gout 04/11/2023   • Hypertension 04/11/2023       Past Surgical History:   Procedure Laterality Date   • CYSTOSCOPY     • CYSTOSCOPY W/ URETERAL STENT PLACEMENT     • TONSILLECTOMY         Pediatric History   Patient Parents   • Not on file     Other Topics Concern   • Not on file   Social History Narrative   • Not on file       family history includes Alcohol abuse in his father; COPD in his mother; Cerebral aneurysm in his father; Dementia in his mother.    No Known Allergies    Medication:  Current Facility-Administered Medications   Medication Dose Route Frequency Provider Last Rate Last Admin   • sennosides-docusate (PERICOLACE) 8.6-50 MG per tablet 2 tablet  2 tablet Oral BID Tracy Brown,         And   • polyethylene glycol (MIRALAX) packet 17 g  17 g Oral Daily PRN Tracy Brown DO        And   • bisacodyl (DULCOLAX) EC tablet 5 mg  5 mg Oral Daily PRN Tracy Brown DO        And   • bisacodyl (DULCOLAX) suppository 10 mg  10 mg Rectal Daily PRN Tracy Brown DO       • cefTRIAXone (ROCEPHIN) 2 g/100 mL 0.9% NS IVPB (MBP)  2 g Intravenous Q24H Tracy Brown DO       • melatonin tablet 5 mg  5 mg Oral Nightly PRN Vahe Rendon PAYanniC       • ondansetron (ZOFRAN) injection 4 mg  4 mg Intravenous Q6H PRN Vahe Rendon PA-C       • pantoprazole (PROTONIX) injection 40 mg  40 mg Intravenous BID Guevara Kruse MD   40 mg at 04/13/23 1002   • sodium chloride 0.9 % flush 10 mL  10 mL Intravenous PRN Ozzy Martinez DO       • sodium chloride 0.9 % flush 10 mL  10 mL Intravenous Q12H Vahe Rendon PA-C   10 mL at 04/13/23 1004   • sodium chloride 0.9 % flush 10 mL  10 mL Intravenous PRN Vahe Rendon PA-C       • sodium chloride 0.9 % infusion 40 mL  40 mL Intravenous PRN Vahe Rendon PA-C       • tamsulosin (FLOMAX) 24 hr capsule 0.4 mg  0.4 mg Oral Daily Vahe Rendon PA-C   0.4 mg at 04/13/23 1002  "      Antibiotics:  Anti-Infectives (From admission, onward)    Ordered     Dose/Rate Route Frequency Start Stop    04/13/23 0955  cefTRIAXone (ROCEPHIN) 2 g/100 mL 0.9% NS IVPB (MBP)        Ordering Provider: Tracy Brown DO    2 g  over 30 Minutes Intravenous Every 24 Hours 04/13/23 1100 04/20/23 1059    04/12/23 0751  piperacillin-tazobactam (ZOSYN) 3.375 g in iso-osmotic dextrose 50 ml (premix)        Ordering Provider: Bridgette Hernadez DO    3.375 g  over 30 Minutes Intravenous Once 04/12/23 0845 04/12/23 0848            Review of Systems    4/13/23     Constitutional--fever better with no chills or sweats.  Appetite improving with fatigue.  Heent-- No new vision, hearing or throat complaints.  No epistaxis or oral sores.  Denies odynophagia or dysphagia.  No flashers, floaters or eye pain. No odynophagia or dysphagia. No headache, photophobia or neck stiffness.  CV-- No chest pain, palpitation or syncope  Resp-- No SOB/cough/Hemoptysis  GI-see above.   No nausea, vomiting, or diarrhea.  No hematochezia, melena, or hematemesis. Denies jaundice or chronic liver disease.  --see above  Lymph- no swollen lymph nodes in neck/axilla or groin.   Heme- No active bruising or bleeding; no Hx of DVT or PE.  MS-- no swelling or pain in the bones or joints of arms/legs.  No new back pain.  Neuro-- No acute focal weakness or numbness in the arms or legs.  No seizures.    Full 12 point review of systems reviewed and negative otherwise for acute complaints, except for above    Physical Exam:   Vital Signs   /53 (BP Location: Right arm, Patient Position: Lying)   Pulse 64   Temp 97.9 °F (36.6 °C) (Oral)   Resp 18   Ht 172.7 cm (68\")   Wt 81.6 kg (180 lb)   SpO2 (!) 89%   BMI 27.37 kg/m²     GENERAL: Awake and alert, in no acute distress.  Chronically ill-appearing and oriented  HEENT: Normocephalic, atraumatic.  PERRL. EOMI. No conjunctival injection. No icterus. Oropharynx clear without evidence of thrush " or exudate. No evidence of peridontal disease.    NECK: Supple without nuchal rigidity. No mass.  LYMPH: No cervical, axillary or inguinal lymphadenopathy.  HEART: RRR; No murmur, rubs, gallops.   LUNGS: Clear to auscultation bilaterally without wheezing, rales, rhonchi. Normal respiratory effort. Nonlabored. No dullness.  ABDOMEN: Soft, nontender, nondistended. Positive bowel sounds. No rebound or guarding. NO mass or HSM.  EXT:  No cyanosis, clubbing or edema. No cord.  : Genitalia generally unremarkable.  Without Glass catheter.  MSK: FROM without joint effusions noted arms/legs.    SKIN: Warm and dry without cutaneous eruptions on Inspection/palpation.    NEURO: Oriented to PPT. No focal deficits on motor/sensory exam at arms/legs.  PSYCHIATRIC: Normal insight and judgement. Cooperative with PE    No peripheral stigmata/phenomena of endocarditis    IV without obvious redness or drainage    Laboratory Data    Results from last 7 days   Lab Units 04/13/23  0337 04/12/23  0324 04/11/23  1657   WBC 10*3/mm3 13.79* 13.88* 15.26*   HEMOGLOBIN g/dL 13.1 13.3 15.1   HEMATOCRIT % 39.1 38.7 43.4   PLATELETS 10*3/mm3 223 191 212     Results from last 7 days   Lab Units 04/13/23  0332   SODIUM mmol/L 136   POTASSIUM mmol/L 3.9   CHLORIDE mmol/L 104   CO2 mmol/L 22.0   BUN mg/dL 46*   CREATININE mg/dL 1.84*   GLUCOSE mg/dL 173*   CALCIUM mg/dL 8.0*     Results from last 7 days   Lab Units 04/13/23  0332   ALK PHOS U/L 223*   BILIRUBIN mg/dL 0.7   ALT (SGPT) U/L 106*   AST (SGOT) U/L 179*     Results from last 7 days   Lab Units 04/13/23  0337   SED RATE mm/hr 85*     Results from last 7 days   Lab Units 04/13/23  0332   CRP mg/dL 23.79*       Estimated Creatinine Clearance: 32.6 mL/min (A) (by C-G formula based on SCr of 1.84 mg/dL (H)).      Microbiology:      Radiology:  Imaging Results (Last 72 Hours)     Procedure Component Value Units Date/Time    CT Abdomen Pelvis With Contrast [[id number]] Collected: 04/12/23 3682      Updated: 04/12/23 1434    Narrative:      CT ABDOMEN PELVIS W CONTRAST    Date of Exam: 4/12/2023 1:26 PM EDT    Indication: Abdominal pain, acute, nonlocalized, recent abdominal CT without contrast demonstrating possible diverticular abscess associated with the duodenum     Comparison: CT abdomen and pelvis without contrast 4/11/2023    Technique: Axial CT images were obtained of the abdomen and pelvis following the uneventful intravenous administration of 95 mL Isovue 300. Reconstructed coronal and sagittal images were also obtained. Automated exposure control and iterative   construction methods were used.    Findings:  Lung bases without consolidation. The visualized pulmonary arteries are well-opacified with contrast. Heart size normal. Small left pleural effusion stable from prior study with minimal left basilar atelectasis.    The liver is normal in size and contour. Gallbladder present with cholelithiasis noted. No pericholecystic inflammation. The adrenal glands are normal. The spleen is normal in size. Scattered areas of fatty replacement of pancreatic parenchyma. At the   pancreatic head there is a low-attenuation cystic lesion measuring 3.6 x 2.2 cm. No upstream main pancreatic duct dilatation. Normal caliber common bile duct.    Kidneys are symmetric in size. Previously seen 6 mm obstructing calculus in mid left ureter has resolved. There is mild left urothelial hyperemia and thickening at this level the ureter which relate to inflammation. There are 2 nonobstructing calculi at   the left kidney measuring 7 mm and 8 mm which are stable. Mild left hydronephrosis and moderate left renal pelvic dilatation and proximal ureteral dilatation slightly decreased from prior study. Multiple nonobstructing right-sided renal calculi noted for   example at the lower pole measuring 16 mm and mid left kidney measuring 14 mm. Duplicated right renal collecting system. No right ureteral calculus. Large calculus  dependently in bladder measures 17 mm, unchanged. Prostatomegaly.    Negative for pneumoperitoneum. No bowel obstruction. The appendix is normal. There is hyperemia and inflammatory stranding surrounding the second and third portions of the duodenum suggesting duodenitis. No intramural abscess.     The portal vein, splenic vein, superior mesenteric vein are patent. Moderate vascular calcifications of the abdominal aorta with ectasia of infrarenal aorta measuring up to 2.8 x 2.4 cm. Moderate convex right dextrocurvature of the lumbar spine centered   at L3. No aggressive osseous lesion or fracture. 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      Impression:      Impression:  1. Obstructing calculus in mid left ureter resolved. Mild left hydronephrosis and moderate left proximal ureter dilatation slightly improved. Bilateral nonobstructing nephrolithiasis with large calculus in bladder.  2. Inflammatory stranding surrounding second and third portion duodenum consistent with duodenitis.  3. 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  4. Stable small left pleural effusion.  5. Cholelithiasis, prostatomegaly, and chronic findings above.    Electronically Signed: Gurinder Jeffers    4/12/2023 2:30 PM EDT    Workstation ID: [id number]    CT Abdomen Pelvis Without Contrast [[id number]] Collected: 04/11/23 2302     Updated: 04/11/23 2311    Narrative:      CT ABDOMEN PELVIS WO CONTRAST    Date of Exam: 4/11/2023 10:34 PM EDT    Indication: new UTI in male , microscopic hematuria.    Comparison: None available.    Technique: Axial CT images were obtained of the abdomen and pelvis without the administration of contrast. Reconstructed coronal and sagittal images were also obtained. Automated exposure control and iterative construction methods were used.    Findings:  The lung  bases are clear. Evaluation of the body wall soft tissues demonstrates no acute or suspicious findings. There is advanced multilevel spondylosis and lumbar dextrocurvature. The liver is homogeneous. Cholelithiasis is present with the gallbladder   otherwise nondistended. Homogeneous spleen. No suspicious focal pancreatic lesion. There is extensive bilateral nephrolithiasis, without evidence of obstruction on the right. On the left, there is moderate to severe hydronephrosis and a rounded 6 mm   stone is present within the mid portion of the left ureter. A large stone is noted in the bladder. There is severe wall edema with adjacent mesenteric stranding present involving the transverse portion of the duodenum, incompletely characterized given   lack of IV contrast, with appearance concerning for an adjacent inflamed large duodenal diverticulum, with component of small adjacent abscess suspected, measuring approximately 3 cm on image 56 of the axial series. There is no evidence of associated   bowel obstruction. There is no overt pneumoperitoneum. Atherosclerotic abdominal aorta. The pelvic viscera demonstrate no additional acute findings.      Impression:      Impression:  Bilateral nephrolithiasis is present, with an obstructing stone present in the midportion of the left ureter, with associated left-sided hydronephrosis and nephropathy.    There is severe wall edema with adjacent mesenteric stranding present involving the transverse portion of the duodenum, incompletely characterized given lack of IV contrast, with appearance concerning for an adjacent inflamed large duodenal diverticulum,   with possible small adjacent abscess suspected, measuring approximately 3 cm on image 56 of the axial series. There is no evidence of associated bowel obstruction. Consider contrast-enhanced CT to further evaluate.      Electronically Signed: Myron Vyas    4/11/2023 11:08 PM EDT    Workstation ID: [id number]    CT Head Without  Contrast Stroke Protocol [[id number]] Collected: 04/11/23 2255     Updated: 04/11/23 2259    Narrative:      CT HEAD WO CONTRAST STROKE PROTOCOL    Date of Exam: 4/11/2023 10:31 PM EDT    Indication: aMS, ATAXIA.    Comparison: None available.    Technique: Axial CT images were obtained of the head without contrast administration.  Reconstructed coronal and sagittal images were also obtained. Automated exposure control and iterative construction methods were used.    FINDINGS: Gray-white differentiation is maintained and there is no evidence of intracranial hemorrhage, mass or mass effect. Age-related changes of the brain are present including volume loss and typical periventricular sequela of chronic small vessel   ischemia. There is otherwise no evidence of intracranial hemorrhage, mass or mass effect. The ventricles are normal in size and configuration accounting for surrounding volume loss. The orbits are normal and the paranasal sinuses are grossly clear.      Impression:      Age-related changes of the brain as above, otherwise without evidence of acute intracranial abnormality.       Electronically Signed: Myron Vyas    4/11/2023 10:56 PM EDT    Workstation ID: [id number]    XR Chest 1 View [[id number]] Collected: 04/11/23 1810     Updated: 04/11/23 1813    Narrative:      XR CHEST 1 VW    Date of Exam: 4/11/2023 5:22 PM EDT    Indication: Weak/Dizzy/AMS triage protocol.    Comparison: None available.    FINDINGS: Mild chronic changes of the lung fields are present without focal airspace opacity. There is no significant pleural effusion or distinct pneumothorax. Normal heart and mediastinal contours.       Impression:      Mild chronic changes of the lung fields without evidence of acute cardiopulmonary abnormality.     Electronically Signed: Myron Vyas    4/11/2023 6:10 PM EDT    Workstation ID: [id number]            Impression:     --Acute Klebsiella bacteremia/septicemia, urinary source associated with  renal/bladder calculi, ongoing ceftriaxone.  Urology following for further functional/anatomic assessment and further intervention at their discretion.    --Renal/bladder calculi.  Left side hydronephrosis improved and subsequent scan and urology following as above.    --Abnormal imaging with concern for duodenitis and pancreatic cystic lesion.  Supportive treatment ongoing by medicine/surgery team, proton pump inhibition and they are testing for H. pylori at their discretion; treatment per them; MRI pending for further clarification; initial scan had raised some concern for possible abscess but subsequent scan less convincing per radiology and instead description of cystic lesion as above.  If stronger evidence for progressive intra-abdominal infection despite current therapy, you could consider empirically broader therapy; he reports improvements with current therapy    --Abnormal creatinine.  Baseline unclear and needs further clarification by medicine team.  Monitor to help guide future adjustments and antimicrobials    --Abnormal LFTs.  Chronicity unclear.  Monitor        PLAN: Thank you for asking us to see Abdiaziz Enrique Jr., I recommend the following:    -- IV ceftriaxone    -- Check/review labs cultures and scans    -- Partial history per nursing staff    -- Discussed with daughter.  History per her as well    -- Discussed with microbiology    -- Discussed with pharmacy    -- Further imaging pending as per surgical team    -- Highly complex set of issues with high risk for further serious morbidity and other serious sequela       Justice Mcmillan MD  4/13/2023

## 2023-04-14 ENCOUNTER — APPOINTMENT (OUTPATIENT)
Dept: MRI IMAGING | Facility: HOSPITAL | Age: 88
End: 2023-04-14
Payer: OTHER GOVERNMENT

## 2023-04-14 PROBLEM — K29.80 DUODENITIS: Status: ACTIVE | Noted: 2023-04-11

## 2023-04-14 PROBLEM — R78.81 GRAM-NEGATIVE BACTEREMIA: Status: ACTIVE | Noted: 2023-04-14

## 2023-04-14 LAB
ALBUMIN SERPL-MCNC: 2.7 G/DL (ref 3.5–5.2)
ALBUMIN/GLOB SERPL: 1 G/DL
ALP SERPL-CCNC: 229 U/L (ref 39–117)
ALT SERPL W P-5'-P-CCNC: 96 U/L (ref 1–41)
ANION GAP SERPL CALCULATED.3IONS-SCNC: 9 MMOL/L (ref 5–15)
AST SERPL-CCNC: 94 U/L (ref 1–40)
BILIRUB SERPL-MCNC: 0.6 MG/DL (ref 0–1.2)
BUN SERPL-MCNC: 39 MG/DL (ref 8–23)
BUN/CREAT SERPL: 23.5 (ref 7–25)
CALCIUM SPEC-SCNC: 8.2 MG/DL (ref 8.6–10.5)
CHLORIDE SERPL-SCNC: 106 MMOL/L (ref 98–107)
CO2 SERPL-SCNC: 23 MMOL/L (ref 22–29)
CREAT SERPL-MCNC: 1.66 MG/DL (ref 0.76–1.27)
DEPRECATED RDW RBC AUTO: 49 FL (ref 37–54)
EGFRCR SERPLBLD CKD-EPI 2021: 39.7 ML/MIN/1.73
ERYTHROCYTE [DISTWIDTH] IN BLOOD BY AUTOMATED COUNT: 13.7 % (ref 12.3–15.4)
GLOBULIN UR ELPH-MCNC: 2.7 GM/DL
GLUCOSE BLDC GLUCOMTR-MCNC: 155 MG/DL (ref 70–130)
GLUCOSE SERPL-MCNC: 157 MG/DL (ref 65–99)
HCT VFR BLD AUTO: 37.7 % (ref 37.5–51)
HGB BLD-MCNC: 12.5 G/DL (ref 13–17.7)
MCH RBC QN AUTO: 31.6 PG (ref 26.6–33)
MCHC RBC AUTO-ENTMCNC: 33.2 G/DL (ref 31.5–35.7)
MCV RBC AUTO: 95.4 FL (ref 79–97)
PLATELET # BLD AUTO: 275 10*3/MM3 (ref 140–450)
PMV BLD AUTO: 10.8 FL (ref 6–12)
POTASSIUM SERPL-SCNC: 3.7 MMOL/L (ref 3.5–5.2)
PROT SERPL-MCNC: 5.4 G/DL (ref 6–8.5)
RBC # BLD AUTO: 3.95 10*6/MM3 (ref 4.14–5.8)
SODIUM SERPL-SCNC: 138 MMOL/L (ref 136–145)
WBC NRBC COR # BLD: 13.13 10*3/MM3 (ref 3.4–10.8)

## 2023-04-14 PROCEDURE — 85027 COMPLETE CBC AUTOMATED: CPT | Performed by: INTERNAL MEDICINE

## 2023-04-14 PROCEDURE — 99232 SBSQ HOSP IP/OBS MODERATE 35: CPT | Performed by: INTERNAL MEDICINE

## 2023-04-14 PROCEDURE — 25010000002 CEFTRIAXONE PER 250 MG: Performed by: FAMILY MEDICINE

## 2023-04-14 PROCEDURE — 82962 GLUCOSE BLOOD TEST: CPT

## 2023-04-14 PROCEDURE — 74181 MRI ABDOMEN W/O CONTRAST: CPT

## 2023-04-14 PROCEDURE — 80053 COMPREHEN METABOLIC PANEL: CPT | Performed by: INTERNAL MEDICINE

## 2023-04-14 RX ADMIN — PANTOPRAZOLE SODIUM 40 MG: 40 INJECTION, POWDER, LYOPHILIZED, FOR SOLUTION INTRAVENOUS at 18:12

## 2023-04-14 RX ADMIN — CEFTRIAXONE 2 G: 2 INJECTION, POWDER, FOR SOLUTION INTRAMUSCULAR; INTRAVENOUS at 10:09

## 2023-04-14 RX ADMIN — Medication 10 ML: at 21:43

## 2023-04-14 RX ADMIN — TAMSULOSIN HYDROCHLORIDE 0.4 MG: 0.4 CAPSULE ORAL at 10:08

## 2023-04-14 RX ADMIN — Medication 10 ML: at 10:09

## 2023-04-14 RX ADMIN — SENNOSIDES AND DOCUSATE SODIUM 2 TABLET: 50; 8.6 TABLET ORAL at 10:08

## 2023-04-14 RX ADMIN — PANTOPRAZOLE SODIUM 40 MG: 40 INJECTION, POWDER, LYOPHILIZED, FOR SOLUTION INTRAVENOUS at 10:09

## 2023-04-14 NOTE — PLAN OF CARE
Goal Outcome Evaluation:  Plan of Care Reviewed With: patient, daughter   Pt a/o x 4 on RA VSS NSR no c/o pain or SOA. Pt up to BR w/standby assist. Meds administered as ordered.     Progress: no change

## 2023-04-14 NOTE — PROGRESS NOTES
Abdiaziz Enrique .  1935  [id number]      Chief Complaint: fatigue    Reason for Consultation: bactermic urinary infection    History of present illness:     Patient is a 87 y.o.  Yr old male not a great historian with respect to past medical care, daughter is present to give information, with history of renal calculi/bladder calculi and prior care at the [facility], has had cystoscopy previously and a prior stent although the specifics of that are not clear.  Daughter had been out of town, reports returning to town and finding her dad confused, lethargic and poor p.o. intake with dysuria; diagnosed with acute UTI and abnormal CT scan with concern for obstructing stone at the left ureter/bilateral nephrolithiasis in addition to severe wall edema involving the duodenum and possibility for large inflamed duodenal diverticulum and possible adjacent abscess on April 11.  Repeat CT scan April 12 with obstructing calculus in the mid left ureter resolved, mild left hydronephrosis/moderate left proximal ureteral dilation improved per radiology with large calculus in the bladder.  Duodenitis noted with cystic lesion in the pancreatic head but no other description of abscess.  Surgery has seen him.    4/14/23 Recent dysuria improved.  Denies any current abdominal pain.  Appetite diminished but denies overt vomiting/diarrhea and no hematochezia melena or hematemesis.  He has pure wick in place has had previous urinary urgency/frequency but denies having to strain.  No hematuria or pyuria.  Currently no flank pain    Daughter reports that his mental status has improved.  Back to baseline.  No headache photophobia or neck stiffness.  No shortness of breath cough or hemoptysis.  No skin rash      Past Medical History:   Diagnosis Date   • Bladder stone    • BPH (benign prostatic hyperplasia) 04/11/2023   • Gout 04/11/2023   • Hypertension 04/11/2023       Past Surgical History:   Procedure Laterality Date   •  "CYSTOSCOPY     • CYSTOSCOPY W/ URETERAL STENT PLACEMENT     • TONSILLECTOMY         Pediatric History   Patient Parents   • Not on file     Other Topics Concern   • Not on file   Social History Narrative   • Not on file       family history includes Alcohol abuse in his father; COPD in his mother; Cerebral aneurysm in his father; Dementia in his mother.    No Known Allergies     Review of Systems    4/14/23     Constitutional--fever better with no chills or sweats.  Appetite improving with fatigue.  Heent-- No new vision, hearing or throat complaints.  No epistaxis or oral sores.  Denies odynophagia or dysphagia.  No flashers, floaters or eye pain. No odynophagia or dysphagia. No headache, photophobia or neck stiffness.  CV-- No chest pain, palpitation or syncope  Resp-- No SOB/cough/Hemoptysis  GI-see above.   No nausea, vomiting, or diarrhea.  No hematochezia, melena, or hematemesis. Denies jaundice or chronic liver disease.  --see above  Lymph- no swollen lymph nodes in neck/axilla or groin.   Heme- No active bruising or bleeding; no Hx of DVT or PE.  MS-- no swelling or pain in the bones or joints of arms/legs.  No new back pain.  Neuro-- No acute focal weakness or numbness in the arms or legs.  No seizures.    Full 12 point review of systems reviewed and negative otherwise for acute complaints, except for above    Physical Exam:   Vital Signs   /60 (BP Location: Left arm, Patient Position: Sitting)   Pulse 63   Temp 98.4 °F (36.9 °C) (Oral)   Resp 18   Ht 172.7 cm (68\")   Wt 81.6 kg (180 lb)   SpO2 91%   BMI 27.37 kg/m²     GENERAL: Awake and alert, in no acute distress.  Chronically ill-appearing and oriented  HEENT: Normocephalic, atraumatic.  PERRL. EOMI. No conjunctival injection. No icterus. Oropharynx clear without evidence of thrush or exudate. No evidence of peridontal disease.    NECK: Supple without nuchal rigidity. No mass.  LYMPH: No cervical, axillary or inguinal " lymphadenopathy.  HEART: RRR; No murmur, rubs, gallops.   LUNGS: Clear to auscultation bilaterally without wheezing, rales, rhonchi. Normal respiratory effort. Nonlabored. No dullness.  ABDOMEN: Soft, nontender, nondistended. Positive bowel sounds. No rebound or guarding. NO mass or HSM.  EXT:  No cyanosis, clubbing or edema. No cord.  MSK: FROM without joint effusions noted arms/legs.    SKIN: Warm and dry without cutaneous eruptions on Inspection/palpation.    NEURO: Oriented to PPT. No focal deficits on motor/sensory exam at arms/legs.  PSYCHIATRIC: Normal insight and judgement. Cooperative with PE    No peripheral stigmata/phenomena of endocarditis    IV without obvious redness or drainage    Laboratory Data    Results from last 7 days   Lab Units 04/14/23  0523 04/13/23  0337 04/12/23  0324   WBC 10*3/mm3 13.13* 13.79* 13.88*   HEMOGLOBIN g/dL 12.5* 13.1 13.3   HEMATOCRIT % 37.7 39.1 38.7   PLATELETS 10*3/mm3 275 223 191     Results from last 7 days   Lab Units 04/14/23  0523   SODIUM mmol/L 138   POTASSIUM mmol/L 3.7   CHLORIDE mmol/L 106   CO2 mmol/L 23.0   BUN mg/dL 39*   CREATININE mg/dL 1.66*   GLUCOSE mg/dL 157*   CALCIUM mg/dL 8.2*     Results from last 7 days   Lab Units 04/14/23  0523   ALK PHOS U/L 229*   BILIRUBIN mg/dL 0.6   ALT (SGPT) U/L 96*   AST (SGOT) U/L 94*     Results from last 7 days   Lab Units 04/13/23  0337   SED RATE mm/hr 85*     Results from last 7 days   Lab Units 04/13/23  0332   CRP mg/dL 23.79*       Estimated Creatinine Clearance: 36.2 mL/min (A) (by C-G formula based on SCr of 1.66 mg/dL (H)).      Microbiology:      Radiology:  Imaging Results (Last 72 Hours)     Procedure Component Value Units Date/Time    CT Abdomen Pelvis With Contrast [[id number]] Collected: 04/12/23 1407     Updated: 04/12/23 1434    Narrative:      CT ABDOMEN PELVIS W CONTRAST    Date of Exam: 4/12/2023 1:26 PM EDT    Indication: Abdominal pain, acute, nonlocalized, recent abdominal CT without contrast  demonstrating possible diverticular abscess associated with the duodenum     Comparison: CT abdomen and pelvis without contrast 4/11/2023    Technique: Axial CT images were obtained of the abdomen and pelvis following the uneventful intravenous administration of 95 mL Isovue 300. Reconstructed coronal and sagittal images were also obtained. Automated exposure control and iterative   construction methods were used.    Findings:  Lung bases without consolidation. The visualized pulmonary arteries are well-opacified with contrast. Heart size normal. Small left pleural effusion stable from prior study with minimal left basilar atelectasis.    The liver is normal in size and contour. Gallbladder present with cholelithiasis noted. No pericholecystic inflammation. The adrenal glands are normal. The spleen is normal in size. Scattered areas of fatty replacement of pancreatic parenchyma. At the   pancreatic head there is a low-attenuation cystic lesion measuring 3.6 x 2.2 cm. No upstream main pancreatic duct dilatation. Normal caliber common bile duct.    Kidneys are symmetric in size. Previously seen 6 mm obstructing calculus in mid left ureter has resolved. There is mild left urothelial hyperemia and thickening at this level the ureter which relate to inflammation. There are 2 nonobstructing calculi at   the left kidney measuring 7 mm and 8 mm which are stable. Mild left hydronephrosis and moderate left renal pelvic dilatation and proximal ureteral dilatation slightly decreased from prior study. Multiple nonobstructing right-sided renal calculi noted for   example at the lower pole measuring 16 mm and mid left kidney measuring 14 mm. Duplicated right renal collecting system. No right ureteral calculus. Large calculus dependently in bladder measures 17 mm, unchanged. Prostatomegaly.    Negative for pneumoperitoneum. No bowel obstruction. The appendix is normal. There is hyperemia and inflammatory stranding surrounding the  second and third portions of the duodenum suggesting duodenitis. No intramural abscess.     The portal vein, splenic vein, superior mesenteric vein are patent. Moderate vascular calcifications of the abdominal aorta with ectasia of infrarenal aorta measuring up to 2.8 x 2.4 cm. Moderate convex right dextrocurvature of the lumbar spine centered   at L3. No aggressive osseous lesion or fracture. 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      Impression:      Impression:  1. Obstructing calculus in mid left ureter resolved. Mild left hydronephrosis and moderate left proximal ureter dilatation slightly improved. Bilateral nonobstructing nephrolithiasis with large calculus in bladder.  2. Inflammatory stranding surrounding second and third portion duodenum consistent with duodenitis.  3. 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  4. Stable small left pleural effusion.  5. Cholelithiasis, prostatomegaly, and chronic findings above.    Electronically Signed: Gurinder Jeffers    4/12/2023 2:30 PM EDT    Workstation ID: [id number]    CT Abdomen Pelvis Without Contrast [[id number]] Collected: 04/11/23 2302     Updated: 04/11/23 2311    Narrative:      CT ABDOMEN PELVIS WO CONTRAST    Date of Exam: 4/11/2023 10:34 PM EDT    Indication: new UTI in male , microscopic hematuria.    Comparison: None available.    Technique: Axial CT images were obtained of the abdomen and pelvis without the administration of contrast. Reconstructed coronal and sagittal images were also obtained. Automated exposure control and iterative construction methods were used.    Findings:  The lung bases are clear. Evaluation of the body wall soft tissues demonstrates no acute or suspicious findings. There is advanced multilevel spondylosis and lumbar dextrocurvature. The liver is homogeneous.  Cholelithiasis is present with the gallbladder   otherwise nondistended. Homogeneous spleen. No suspicious focal pancreatic lesion. There is extensive bilateral nephrolithiasis, without evidence of obstruction on the right. On the left, there is moderate to severe hydronephrosis and a rounded 6 mm   stone is present within the mid portion of the left ureter. A large stone is noted in the bladder. There is severe wall edema with adjacent mesenteric stranding present involving the transverse portion of the duodenum, incompletely characterized given   lack of IV contrast, with appearance concerning for an adjacent inflamed large duodenal diverticulum, with component of small adjacent abscess suspected, measuring approximately 3 cm on image 56 of the axial series. There is no evidence of associated   bowel obstruction. There is no overt pneumoperitoneum. Atherosclerotic abdominal aorta. The pelvic viscera demonstrate no additional acute findings.      Impression:      Impression:  Bilateral nephrolithiasis is present, with an obstructing stone present in the midportion of the left ureter, with associated left-sided hydronephrosis and nephropathy.    There is severe wall edema with adjacent mesenteric stranding present involving the transverse portion of the duodenum, incompletely characterized given lack of IV contrast, with appearance concerning for an adjacent inflamed large duodenal diverticulum,   with possible small adjacent abscess suspected, measuring approximately 3 cm on image 56 of the axial series. There is no evidence of associated bowel obstruction. Consider contrast-enhanced CT to further evaluate.      Electronically Signed: Myron Vyas    4/11/2023 11:08 PM EDT    Workstation ID: [id number]    CT Head Without Contrast Stroke Protocol [[id number]] Collected: 04/11/23 2255     Updated: 04/11/23 2259    Narrative:      CT HEAD WO CONTRAST STROKE PROTOCOL    Date of Exam: 4/11/2023 10:31 PM EDT    Indication:  aMS, ATAXIA.    Comparison: None available.    Technique: Axial CT images were obtained of the head without contrast administration.  Reconstructed coronal and sagittal images were also obtained. Automated exposure control and iterative construction methods were used.    FINDINGS: Gray-white differentiation is maintained and there is no evidence of intracranial hemorrhage, mass or mass effect. Age-related changes of the brain are present including volume loss and typical periventricular sequela of chronic small vessel   ischemia. There is otherwise no evidence of intracranial hemorrhage, mass or mass effect. The ventricles are normal in size and configuration accounting for surrounding volume loss. The orbits are normal and the paranasal sinuses are grossly clear.      Impression:      Age-related changes of the brain as above, otherwise without evidence of acute intracranial abnormality.       Electronically Signed: Myron Vyas    4/11/2023 10:56 PM EDT    Workstation ID: [id number]    XR Chest 1 View [[id number]] Collected: 04/11/23 1810     Updated: 04/11/23 1813    Narrative:      XR CHEST 1 VW    Date of Exam: 4/11/2023 5:22 PM EDT    Indication: Weak/Dizzy/AMS triage protocol.    Comparison: None available.    FINDINGS: Mild chronic changes of the lung fields are present without focal airspace opacity. There is no significant pleural effusion or distinct pneumothorax. Normal heart and mediastinal contours.       Impression:      Mild chronic changes of the lung fields without evidence of acute cardiopulmonary abnormality.     Electronically Signed: Myron Vyas    4/11/2023 6:10 PM EDT    Workstation ID: [id number]            Impression:     --Acute Klebsiella bacteremia/septicemia, urinary source associated with renal/bladder calculi, ongoing ceftriaxone.  Urology following for further functional/anatomic assessment and further intervention at their discretion.    --Renal/bladder calculi.  Left side  hydronephrosis improved and subsequent scan and urology following as above.    --Abnormal imaging with concern for duodenitis and pancreatic cystic lesion.  Supportive treatment ongoing by medicine/surgery team, proton pump inhibition and they are testing for H. pylori at their discretion; treatment per them; MRI pending for further clarification; initial scan had raised some concern for possible abscess but subsequent scan less convincing per radiology and instead description of cystic lesion as above.  If stronger evidence for progressive intra-abdominal infection despite current therapy, you could consider empirically broader therapy; he reports improvements with current therapy    --Abnormal creatinine.  Baseline unclear and needs further clarification by medicine team.  Monitor to help guide future adjustments and antimicrobials    --Abnormal LFTs.  Chronicity unclear.  Monitor        PLAN:       -- IV ceftriaxone    -- Check/review labs cultures and scans    -- Partial history per nursing staff    -- Discussed with daughter.  History per her as well    -- Discussed with microbiology    -- Discussed with pharmacy    -- Further imaging pending as per surgical team    -- Highly complex set of issues with high risk for further serious morbidity and other serious sequela    **Copied text in this note has been reviewed and is accurate as of 04/14/23.        Justice Mcmillan MD  4/14/2023

## 2023-04-14 NOTE — PLAN OF CARE
Problem: Fall Injury Risk  Goal: Absence of Fall and Fall-Related Injury  Intervention: Promote Injury-Free Environment  Recent Flowsheet Documentation  Taken 4/14/2023 0410 by Dorothy Landry RN  Safety Promotion/Fall Prevention:   assistive device/personal items within reach   activity supervised   safety round/check completed  Taken 4/14/2023 0215 by Dorothy Landry RN  Safety Promotion/Fall Prevention:   activity supervised   assistive device/personal items within reach   safety round/check completed  Taken 4/14/2023 0015 by Dorothy Landry RN  Safety Promotion/Fall Prevention:   assistive device/personal items within reach   activity supervised   safety round/check completed  Taken 4/13/2023 2200 by Dorothy Landry RN  Safety Promotion/Fall Prevention:   activity supervised   assistive device/personal items within reach   safety round/check completed     Problem: Adult Inpatient Plan of Care  Goal: Absence of Hospital-Acquired Illness or Injury  Intervention: Identify and Manage Fall Risk  Recent Flowsheet Documentation  Taken 4/14/2023 0410 by Dorothy Landry RN  Safety Promotion/Fall Prevention:   assistive device/personal items within reach   activity supervised   safety round/check completed  Taken 4/14/2023 0215 by Dorothy Landry RN  Safety Promotion/Fall Prevention:   activity supervised   assistive device/personal items within reach   safety round/check completed  Taken 4/14/2023 0015 by Dorothy Landry RN  Safety Promotion/Fall Prevention:   assistive device/personal items within reach   activity supervised   safety round/check completed  Taken 4/13/2023 2200 by Dorothy Landry RN  Safety Promotion/Fall Prevention:   activity supervised   assistive device/personal items within reach   safety round/check completed     Problem: Adult Inpatient Plan of Care  Goal: Absence of Hospital-Acquired Illness or Injury  Intervention: Prevent Skin Injury  Recent Flowsheet Documentation  Taken 4/14/2023 0410 by Dorothy Landry  RN  Body Position: position changed independently  Taken 4/14/2023 0215 by Dorothy Landry RN  Body Position: position changed independently  Taken 4/14/2023 0015 by Dorothy Landry RN  Body Position: position changed independently  Taken 4/13/2023 2200 by Dorothy Landry RN  Body Position: position changed independently     Problem: Adult Inpatient Plan of Care  Goal: Absence of Hospital-Acquired Illness or Injury  Intervention: Prevent and Manage VTE (Venous Thromboembolism) Risk  Recent Flowsheet Documentation  Taken 4/14/2023 0410 by Dorothy Landry RN  Activity Management: activity encouraged  Taken 4/14/2023 0215 by Dorothy Landry RN  Activity Management: activity encouraged  Taken 4/14/2023 0015 by Dorothy Landry RN  Activity Management: activity encouraged  Taken 4/13/2023 2200 by Dorothy Landry RN  Activity Management: activity encouraged   Goal Outcome Evaluation:           Progress: improving

## 2023-04-14 NOTE — CASE MANAGEMENT/SOCIAL WORK
Continued Stay Note   [facility]     Patient Name: Abdiaziz Enrique Jr.  MRN: [id number]  Today's Date: 4/14/2023    Admit Date: 4/11/2023    Plan: update   Discharge Plan     Row Name 04/14/23 1507       Plan    Plan update    Patient/Family in Agreement with Plan yes    Plan Comments Spoke with patient and family at bedside regarding discharge plan.  Patient reports he does not anticipate going home until some time next week.  No discharge needs verbalized.  CM following.  Patient plan is to discharge home via car with family to transport.    Final Discharge Disposition Code 01 - home or self-care               Discharge Codes    No documentation.               Expected Discharge Date and Time     Expected Discharge Date Expected Discharge Time    Apr 19, 2023             Mia Phan RN

## 2023-04-14 NOTE — PROGRESS NOTES
[facility] Medicine Services  PROGRESS NOTE    Patient Name: Abdiaziz Enrique Jr.  : 1935  MRN: [id number]    Date of Admission: 2023  Primary Care Physician: System, Provider Not In    Subjective   Subjective     CC:  Follow up bacteremia    HPI:  Transferred to this floor overnight. Denies acute complaints, eager to discharge from the hospital. Per night shift documentation patient refused to go down for MRCP overnight and wanted to wait until daytime.    ROS:  Gen- No fevers, chills  CV- No chest pain, palpitations  Resp- No cough, dyspnea  GI- No N/V/D, abd pain     Objective   Objective     Vital Signs:   Temp:  [98.1 °F (36.7 °C)-98.4 °F (36.9 °C)] 98.1 °F (36.7 °C)  Heart Rate:  [61-75] 64  Resp:  [16-18] 16  BP: (116-141)/(53-68) 138/65     Physical Exam:  Constitutional: Awake, alert, elderly male laying in bed in NAD  HENT: NCAT, mucous membranes moist  Respiratory: Clear to auscultation bilaterally, respiratory effort normal   Cardiovascular: RRR, palpable radial pulses  Gastrointestinal: Positive bowel sounds, soft, nontender, nondistended  Musculoskeletal: No bilateral ankle edema  Psychiatric: Appropriate affect, cooperative  Neurologic: Speech clear and fluent    Results Reviewed:  LAB RESULTS:      Lab 23  0523 23  0337 23  0332 23  0324 23  2230 23  1657   WBC 13.13* 13.79*  --  13.88*  --  15.26*   HEMOGLOBIN 12.5* 13.1  --  13.3  --  15.1   HEMATOCRIT 37.7 39.1  --  38.7  --  43.4   PLATELETS 275 223  --  191  --  212   NEUTROS ABS  --  11.17*  --   --   --  12.40*   IMMATURE GRANS (ABS)  --  0.20*  --   --   --  0.08*   LYMPHS ABS  --  0.91  --   --   --  0.90   MONOS ABS  --  1.41*  --   --   --  1.82*   EOS ABS  --  0.06  --   --   --  0.02   MCV 95.4 94.0  --  92.1  --  91.2   SED RATE  --  85*  --  75*  --   --    CRP  --   --  23.79*  --   --  29.63*   PROCALCITONIN  --   --   --   --   --  3.61*   LACTATE  --   --   1.0  --  2.0  --          Lab 04/14/23 0523 04/13/23 0332 04/12/23 0324 04/11/23  1657   SODIUM 138 136 133* 130*   POTASSIUM 3.7 3.9 3.8 3.7   CHLORIDE 106 104 100 96*   CO2 23.0 22.0 20.0* 19.0*   ANION GAP 9.0 10.0 13.0 15.0   BUN 39* 46* 53* 59*   CREATININE 1.66* 1.84* 1.81* 1.99*   EGFR 39.7* 35.0* 35.7* 31.9*   GLUCOSE 157* 173* 175* 184*   CALCIUM 8.2* 8.0* 8.1* 8.9   MAGNESIUM  --   --   --  2.3   HEMOGLOBIN A1C  --   --   --  6.90*         Lab 04/14/23 0523 04/13/23 0332 04/12/23 0324 04/11/23  1657   TOTAL PROTEIN 5.4* 5.6* 5.0* 6.9   ALBUMIN 2.7* 2.6* 2.7* 3.3*   GLOBULIN 2.7 3.0 2.3 3.6   ALT (SGPT) 96* 106* 67* 79*   AST (SGOT) 94* 179* 82* 106*   BILIRUBIN 0.6 0.7 0.7 0.9   ALK PHOS 229* 223* 165* 198*   LIPASE  --   --  24  --          Lab 04/11/23  1657   HSTROP T 23*                 Brief Urine Lab Results  (Last result in the past 365 days)      Color   Clarity   Blood   Leuk Est   Nitrite   Protein   CREAT   Urine HCG        04/11/23 1841 Yellow   Cloudy   Large (3+)   Moderate (2+)   Negative   100 mg/dL (2+)                 Microbiology Results Abnormal     Procedure Component Value - Date/Time    Blood Culture - Blood, Arm, Right [[id number]]  (Normal) Collected: 04/11/23 0222    Lab Status: Preliminary result Specimen: Blood from Arm, Right Updated: 04/13/23 2301     Blood Culture No growth at 2 days          No radiology results from the last 24 hrs        Current medications:  Scheduled Meds:cefTRIAXone, 2 g, Intravenous, Q24H  pantoprazole, 40 mg, Intravenous, BID AC  senna-docusate sodium, 2 tablet, Oral, BID  sodium chloride, 10 mL, Intravenous, Q12H  tamsulosin, 0.4 mg, Oral, Daily      Continuous Infusions:   PRN Meds:.•  senna-docusate sodium **AND** polyethylene glycol **AND** bisacodyl **AND** bisacodyl  •  melatonin  •  ondansetron  •  sodium chloride  •  sodium chloride  •  sodium chloride    Assessment & Plan   Assessment & Plan     Active Hospital Problems    Diagnosis  POA    • **Gram-negative bacteremia [R78.81]  Yes   • Generalized weakness [R53.1]  Yes   • Acute UTI [N39.0]  Yes   • Elevated serum creatinine [R79.89]  Yes   • Elevated liver enzymes [R74.8]  Yes   • Hypertension [I10]  Yes   • Gout [M10.9]  Yes   • BPH (benign prostatic hyperplasia) [N40.0]  Yes   • Left obsructing nephrolithiasis [N20.0]  Yes      Resolved Hospital Problems   No resolved problems to display.        Brief Hospital Course to date:  Abdiaziz Enrique Jr. is a 87 y.o. male w/ BPH, HTN, nephrolithiasis, who was brought for evaluation of generalized weakness and lethargy; UA was concerning for UTI and imaging showed obstructing ureteral stone, large bladder calculus, as well as cystic appearing pancreatic lesion and duodenitis; blood and urine cultures have returned positive for Klebsiella    The following problems are new to me today    Assessment/Plan    Klebsiella complex UTI + bacteremia  LT ureterolithiasis w/ hydronephrosis, improved  Large bladder calculus  BPH  -repeat CT imaging shows resolution of obstructing LT ureteral stone  -seen by Uro, no immediate interventions at this time  -cont flomax  -ID follows, cont IV ceftriaxone    Duodenitis, 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  Expected Discharge   Expected Discharge Date and Time     Expected Discharge Date Expected Discharge Time    Apr 19, 2023            DVT prophylaxis:  Mechanical DVT prophylaxis orders are present.     AM-PAC 6 Clicks Score (PT): 19 (04/13/23 0800)    CODE STATUS:   Code Status and Medical Interventions:   Ordered at:  04/11/23 7048     Level Of Support Discussed With:    Patient     Code Status (Patient has no pulse and is not breathing):    CPR (Attempt to Resuscitate)     Medical Interventions (Patient has pulse or is breathing):    Full Support       Geo Paredes, DO  04/14/23

## 2023-04-15 ENCOUNTER — ANESTHESIA EVENT (OUTPATIENT)
Dept: GASTROENTEROLOGY | Facility: HOSPITAL | Age: 88
End: 2023-04-15
Payer: OTHER GOVERNMENT

## 2023-04-15 ENCOUNTER — ANESTHESIA (OUTPATIENT)
Dept: GASTROENTEROLOGY | Facility: HOSPITAL | Age: 88
End: 2023-04-15
Payer: OTHER GOVERNMENT

## 2023-04-15 LAB
ALBUMIN SERPL-MCNC: 2.8 G/DL (ref 3.5–5.2)
ALBUMIN/GLOB SERPL: 1.2 G/DL
ALP SERPL-CCNC: 231 U/L (ref 39–117)
ALT SERPL W P-5'-P-CCNC: 84 U/L (ref 1–41)
ANION GAP SERPL CALCULATED.3IONS-SCNC: 9 MMOL/L (ref 5–15)
AST SERPL-CCNC: 71 U/L (ref 1–40)
BACTERIA SPEC AEROBE CULT: ABNORMAL
BILIRUB SERPL-MCNC: 0.5 MG/DL (ref 0–1.2)
BUN SERPL-MCNC: 28 MG/DL (ref 8–23)
BUN/CREAT SERPL: 19 (ref 7–25)
CALCIUM SPEC-SCNC: 8.2 MG/DL (ref 8.6–10.5)
CHLORIDE SERPL-SCNC: 110 MMOL/L (ref 98–107)
CO2 SERPL-SCNC: 24 MMOL/L (ref 22–29)
CREAT SERPL-MCNC: 1.47 MG/DL (ref 0.76–1.27)
DEPRECATED RDW RBC AUTO: 48.9 FL (ref 37–54)
EGFRCR SERPLBLD CKD-EPI 2021: 45.9 ML/MIN/1.73
ERYTHROCYTE [DISTWIDTH] IN BLOOD BY AUTOMATED COUNT: 13.9 % (ref 12.3–15.4)
GLOBULIN UR ELPH-MCNC: 2.3 GM/DL
GLUCOSE SERPL-MCNC: 140 MG/DL (ref 65–99)
GRAM STN SPEC: ABNORMAL
HCT VFR BLD AUTO: 41.1 % (ref 37.5–51)
HGB BLD-MCNC: 13.4 G/DL (ref 13–17.7)
ISOLATED FROM: ABNORMAL
MCH RBC QN AUTO: 30.9 PG (ref 26.6–33)
MCHC RBC AUTO-ENTMCNC: 32.6 G/DL (ref 31.5–35.7)
MCV RBC AUTO: 94.7 FL (ref 79–97)
PLATELET # BLD AUTO: 377 10*3/MM3 (ref 140–450)
PMV BLD AUTO: 10.2 FL (ref 6–12)
POTASSIUM SERPL-SCNC: 4.1 MMOL/L (ref 3.5–5.2)
PROT SERPL-MCNC: 5.1 G/DL (ref 6–8.5)
QT INTERVAL: 318 MS
QTC INTERVAL: 403 MS
RBC # BLD AUTO: 4.34 10*6/MM3 (ref 4.14–5.8)
SODIUM SERPL-SCNC: 143 MMOL/L (ref 136–145)
WBC NRBC COR # BLD: 12.4 10*3/MM3 (ref 3.4–10.8)

## 2023-04-15 PROCEDURE — 0DB78ZX EXCISION OF STOMACH, PYLORUS, VIA NATURAL OR ARTIFICIAL OPENING ENDOSCOPIC, DIAGNOSTIC: ICD-10-PCS | Performed by: INTERNAL MEDICINE

## 2023-04-15 PROCEDURE — 25010000002 CEFTRIAXONE PER 250 MG: Performed by: FAMILY MEDICINE

## 2023-04-15 PROCEDURE — 25010000002 PROPOFOL 10 MG/ML EMULSION: Performed by: ANESTHESIOLOGY

## 2023-04-15 PROCEDURE — 99231 SBSQ HOSP IP/OBS SF/LOW 25: CPT | Performed by: INTERNAL MEDICINE

## 2023-04-15 PROCEDURE — 43239 EGD BIOPSY SINGLE/MULTIPLE: CPT | Performed by: INTERNAL MEDICINE

## 2023-04-15 PROCEDURE — 85027 COMPLETE CBC AUTOMATED: CPT | Performed by: INTERNAL MEDICINE

## 2023-04-15 PROCEDURE — 97530 THERAPEUTIC ACTIVITIES: CPT

## 2023-04-15 PROCEDURE — 80053 COMPREHEN METABOLIC PANEL: CPT | Performed by: INTERNAL MEDICINE

## 2023-04-15 PROCEDURE — 97116 GAIT TRAINING THERAPY: CPT

## 2023-04-15 PROCEDURE — 88305 TISSUE EXAM BY PATHOLOGIST: CPT | Performed by: INTERNAL MEDICINE

## 2023-04-15 RX ORDER — FAMOTIDINE 20 MG/1
20 TABLET, FILM COATED ORAL ONCE
Status: CANCELLED | OUTPATIENT
Start: 2023-04-15 | End: 2023-04-15

## 2023-04-15 RX ORDER — SODIUM CHLORIDE 0.9 % (FLUSH) 0.9 %
10 SYRINGE (ML) INJECTION EVERY 12 HOURS SCHEDULED
Status: DISCONTINUED | OUTPATIENT
Start: 2023-04-15 | End: 2023-04-15 | Stop reason: HOSPADM

## 2023-04-15 RX ORDER — SODIUM CHLORIDE 0.9 % (FLUSH) 0.9 %
10 SYRINGE (ML) INJECTION AS NEEDED
Status: DISCONTINUED | OUTPATIENT
Start: 2023-04-15 | End: 2023-04-15 | Stop reason: HOSPADM

## 2023-04-15 RX ORDER — LIDOCAINE HYDROCHLORIDE 10 MG/ML
INJECTION, SOLUTION EPIDURAL; INFILTRATION; INTRACAUDAL; PERINEURAL AS NEEDED
Status: DISCONTINUED | OUTPATIENT
Start: 2023-04-15 | End: 2023-04-15 | Stop reason: SURG

## 2023-04-15 RX ORDER — SODIUM CHLORIDE 9 MG/ML
9 INJECTION, SOLUTION INTRAVENOUS ONCE
Status: COMPLETED | OUTPATIENT
Start: 2023-04-15 | End: 2023-04-15

## 2023-04-15 RX ORDER — MIDAZOLAM HYDROCHLORIDE 1 MG/ML
0.5 INJECTION INTRAMUSCULAR; INTRAVENOUS
Status: DISCONTINUED | OUTPATIENT
Start: 2023-04-15 | End: 2023-04-15 | Stop reason: HOSPADM

## 2023-04-15 RX ORDER — LIDOCAINE HYDROCHLORIDE 10 MG/ML
0.5 INJECTION, SOLUTION EPIDURAL; INFILTRATION; INTRACAUDAL; PERINEURAL ONCE AS NEEDED
Status: DISCONTINUED | OUTPATIENT
Start: 2023-04-15 | End: 2023-04-15 | Stop reason: HOSPADM

## 2023-04-15 RX ORDER — SODIUM CHLORIDE 9 MG/ML
40 INJECTION, SOLUTION INTRAVENOUS AS NEEDED
Status: DISCONTINUED | OUTPATIENT
Start: 2023-04-15 | End: 2023-04-15 | Stop reason: HOSPADM

## 2023-04-15 RX ORDER — PROPOFOL 10 MG/ML
VIAL (ML) INTRAVENOUS CONTINUOUS PRN
Status: DISCONTINUED | OUTPATIENT
Start: 2023-04-15 | End: 2023-04-15 | Stop reason: SURG

## 2023-04-15 RX ORDER — FAMOTIDINE 10 MG/ML
20 INJECTION, SOLUTION INTRAVENOUS ONCE
Status: CANCELLED | OUTPATIENT
Start: 2023-04-15 | End: 2023-04-15

## 2023-04-15 RX ORDER — SODIUM CHLORIDE, SODIUM LACTATE, POTASSIUM CHLORIDE, CALCIUM CHLORIDE 600; 310; 30; 20 MG/100ML; MG/100ML; MG/100ML; MG/100ML
9 INJECTION, SOLUTION INTRAVENOUS CONTINUOUS
Status: DISCONTINUED | OUTPATIENT
Start: 2023-04-15 | End: 2023-04-19 | Stop reason: HOSPADM

## 2023-04-15 RX ADMIN — Medication 10 ML: at 20:47

## 2023-04-15 RX ADMIN — LIDOCAINE HYDROCHLORIDE 50 MG: 10 INJECTION, SOLUTION EPIDURAL; INFILTRATION; INTRACAUDAL; PERINEURAL at 12:04

## 2023-04-15 RX ADMIN — SODIUM CHLORIDE 50 ML/HR: 9 INJECTION, SOLUTION INTRAVENOUS at 11:59

## 2023-04-15 RX ADMIN — SODIUM CHLORIDE 150 ML: 9 INJECTION, SOLUTION INTRAVENOUS at 12:19

## 2023-04-15 RX ADMIN — PANTOPRAZOLE SODIUM 40 MG: 40 INJECTION, POWDER, LYOPHILIZED, FOR SOLUTION INTRAVENOUS at 17:24

## 2023-04-15 RX ADMIN — SODIUM CHLORIDE 9 ML/HR: 9 INJECTION, SOLUTION INTRAVENOUS at 10:27

## 2023-04-15 RX ADMIN — Medication 10 ML: at 08:59

## 2023-04-15 RX ADMIN — PANTOPRAZOLE SODIUM 40 MG: 40 INJECTION, POWDER, LYOPHILIZED, FOR SOLUTION INTRAVENOUS at 08:59

## 2023-04-15 RX ADMIN — PROPOFOL 100 MCG/KG/MIN: 10 INJECTION, EMULSION INTRAVENOUS at 12:04

## 2023-04-15 RX ADMIN — CEFTRIAXONE 2 G: 2 INJECTION, POWDER, FOR SOLUTION INTRAMUSCULAR; INTRAVENOUS at 08:59

## 2023-04-15 NOTE — THERAPY TREATMENT NOTE
Patient Name: Abdiaziz Enrique Jr.  : 1935    MRN: [id number]                              Today's Date: 4/15/2023       Admit Date: 2023    Visit Dx:     ICD-10-CM ICD-9-CM   1. Generalized weakness  R53.1 780.79   2. Acute kidney injury  N17.9 584.9   3. Hyponatremia  E87.1 276.1   4. Confusion  R41.0 298.9   5. Acute UTI  N39.0 599.0   6. Elevated liver enzymes  R74.8 790.5   7. Duodenitis  K29.80 535.60     Patient Active Problem List   Diagnosis   • Generalized weakness   • Acute UTI   • Elevated serum creatinine   • Elevated liver enzymes   • Hypertension   • Gout   • BPH (benign prostatic hyperplasia)   • Left obsructing nephrolithiasis   • Gram-negative bacteremia   • Duodenitis     Past Medical History:   Diagnosis Date   • Bladder stone    • BPH (benign prostatic hyperplasia) 2023   • Gout 2023   • Hypertension 2023     Past Surgical History:   Procedure Laterality Date   • CYSTOSCOPY     • CYSTOSCOPY W/ URETERAL STENT PLACEMENT     • TONSILLECTOMY        General Information     Row Name 04/15/23 1631          Physical Therapy Time and Intention    Document Type therapy note (daily note)  -BA     Mode of Treatment physical therapy  -BA     Row Name 04/15/23 1635          General Information    Patient Profile Reviewed yes  -BA     Existing Precautions/Restrictions fall  -BA     Barriers to Rehab medically complex;hearing deficit  -BA     Row Name 04/15/23 1635          Cognition    Orientation Status (Cognition) oriented x 3  -BA     Row Name 04/15/23 1635          Safety Issues, Functional Mobility    Safety Issues Affecting Function (Mobility) awareness of need for assistance;insight into deficits/self-awareness;safety precaution awareness;safety precautions follow-through/compliance  -BA     Impairments Affecting Function (Mobility) balance;coordination;endurance/activity tolerance;postural/trunk control;strength  -BA           User Key  (r) = Recorded By, (t) = Taken By,  (c) = Cosigned By    Initials Name Provider Type    Wandy Guerrero PT Physical Therapist               Mobility     Row Name 04/15/23 1635          Bed Mobility    Bed Mobility supine-sit;scooting/bridging  -     Scooting/Bridging [place] (Bed Mobility) standby assist;verbal cues  -     Supine-Sit [place] (Bed Mobility) standby assist;verbal cues;nonverbal cues (demo/gesture)  -     Assistive Device (Bed Mobility) bed rails;head of bed elevated  -     Comment, (Bed Mobility) VCs for sequencing and hand placement.  Reported mild dizziness upon sitting EOB, but subsided fairly quickly with seated rest for body to adjust.  -     Row Name 04/15/23 1635          Sit-Stand Transfer    Sit-Stand [place] (Transfers) contact guard;verbal cues;nonverbal cues (demo/gesture)  -     Assistive Device (Sit-Stand Transfers) walker, front-wheeled  -BA     Comment, (Sit-Stand Transfer) VCs/TCs for sequencing and hand placement.  -     Row Name 04/15/23 1634          Gait/Stairs (Locomotion)    [place] Level (Gait) contact guard;verbal cues  -     Assistive Device (Gait) walker, front-wheeled  -BA     Distance in Feet (Gait) 370  -     Deviations/Abnormal Patterns (Gait) bilateral deviations;fletcher decreased;gait speed decreased;stride length decreased  -     Bilateral Gait Deviations forward flexed posture;heel strike decreased  -     Comment, (Gait/Stairs) Demonstrated step through gait pattern with decreased fletcher and step length.  Required 1 standing rest break d/t fatigue.  VCs/TCs for improved stride length, to stay closer to FWW, occasional walker management, improved safety awareness, and upright posture.  Gait distance limited by fatigue.  -           User Key  (r) = Recorded By, (t) = Taken By, (c) = Cosigned By    Initials Name Provider Type    Wandy Guerrero PT Physical Therapist               Obj/Interventions     Row Name 04/15/23 163          Balance     Balance Assessment sitting static balance;sitting dynamic balance;sit to stand dynamic balance;standing static balance;standing dynamic balance  -BA     Static Sitting Balance standby assist  -BA     Dynamic Sitting Balance contact guard  -BA     Position, Sitting Balance unsupported;sitting edge of bed;sitting in chair  -BA     Sit to Stand Dynamic Balance contact guard;verbal cues;non-verbal cues (demo/gesture)  -BA     Static Standing Balance contact guard  -BA     Dynamic Standing Balance contact guard;verbal cues  -BA     Position/Device Used, Standing Balance supported;walker, front-wheeled  -BA     Balance Interventions standing;supported;static;dynamic;occupation based/functional task  -     Comment, Balance Mild instability with standing and ambulation activity with FWW; no overt LOB noted.  Rec use of FWW for ambulation at this time for improved balance and stability.  Prolonged static standing at toilet for hygiene activity; no LOB noted.  -           User Key  (r) = Recorded By, (t) = Taken By, (c) = Cosigned By    Initials Name Provider Type    Wandy Guerrero, PT Physical Therapist               Goals/Plan    No documentation.                Clinical Impression     Row Name 04/15/23 1640          Pain    Pretreatment Pain Rating 0/10 - no pain  -BA     Posttreatment Pain Rating 0/10 - no pain  -     Row Name 04/15/23 1640          Plan of Care Review    Plan of Care Reviewed With patient;son;grandchild(josse);other (see comments)  daughter-in-law  -     Progress improving  -     Outcome Evaluation Improved performance and toleration to activity noted by improved level of assist with bed mobility and increased ambulation distance.  Supine to sit with SBA.  Ambulated 370ft with CGA and FWW.  Rec use of FWW for ambulation at this time for improved balance and support.  VCs for improved safety awareness throughout.  Con to demonstrate decreased functional endurance, gait instability,  weakness, and decreased balance compared to baseline level of function.  Con to progress pt as able per PT POC.  Rec home with 24/7 care, OP PT, and DME of FWW upon d/c.  -     Row Name 04/15/23 1640          Vital Signs    Pre Systolic BP Rehab --  VSS; RN cleared for activity.  -BA     O2 Delivery Pre Treatment room air  -BA     O2 Delivery Intra Treatment room air  -BA     Post SpO2 (%) 94  -BA     O2 Delivery Post Treatment room air  -BA     Pre Patient Position Supine  -BA     Intra Patient Position Standing  -BA     Post Patient Position Sitting  -     Row Name 04/15/23 1640          Positioning and Restraints    Pre-Treatment Position in bed  -BA     Post Treatment Position chair  -BA     In Chair notified nsg;sitting;call light within reach;encouraged to call for assist;exit alarm on;with family/caregiver;waffle cushion  -           User Key  (r) = Recorded By, (t) = Taken By, (c) = Cosigned By    Initials Name Provider Type     Wandy George, PT Physical Therapist               Outcome Measures     Row Name 04/15/23 1643 04/15/23 0800       How much help from another person do you currently need...    Turning from your back to your side while in flat bed without using bedrails? 3  -BA 3  -AM    Moving from lying on back to sitting on the side of a flat bed without bedrails? 3  -BA 3  -AM    Moving to and from a bed to a chair (including a wheelchair)? 3  -BA 3  -AM    Standing up from a chair using your arms (e.g., wheelchair, bedside chair)? 3  -BA 3  -AM    Climbing 3-5 steps with a railing? 3  -BA 3  -AM    To walk in hospital room? 3  -BA 3  -AM    AM-PAC 6 Clicks Score (PT) 18  -BA 18  -AM    Highest level of mobility 6 --> Walked 10 steps or more  -BA 6 --> Walked 10 steps or more  -AM    Row Name 04/15/23 1643          Functional Assessment    Outcome Measure Options AM-PAC 6 Clicks Basic Mobility (PT)  -           User Key  (r) = Recorded By, (t) = Taken By, (c) = Cosigned By     Initials Name Provider Type    AM Rachelle Villegas, RN Registered Nurse    BA Wandy George, PT Physical Therapist                             Physical Therapy Education     Title: PT OT SLP Therapies (In Progress)     Topic: Physical Therapy (In Progress)     Point: Mobility training (In Progress)     Learning Progress Summary           Patient Acceptance, E, NR by BA at 4/15/2023 1643    Acceptance, E,D, VU,NR by LR at 4/12/2023 1438    Comment: Educated on benefits of mobility and being OOB. Educated on safety with mobility, correct supine to sit t/f technique, correct sit<->stand t/f technique, correct gait mechanics, and progression of POC.                   Point: Home exercise program (Done)     Learning Progress Summary           Patient Acceptance, E,D, VU,NR by LR at 4/12/2023 1438    Comment: Educated on benefits of mobility and being OOB. Educated on safety with mobility, correct supine to sit t/f technique, correct sit<->stand t/f technique, correct gait mechanics, and progression of POC.                   Point: Body mechanics (In Progress)     Learning Progress Summary           Patient Acceptance, E, NR by BA at 4/15/2023 1643    Acceptance, E,D, VU,NR by LR at 4/12/2023 1438    Comment: Educated on benefits of mobility and being OOB. Educated on safety with mobility, correct supine to sit t/f technique, correct sit<->stand t/f technique, correct gait mechanics, and progression of POC.                   Point: Precautions (In Progress)     Learning Progress Summary           Patient Acceptance, E, NR by BA at 4/15/2023 1643    Acceptance, E,D, VU,NR by LR at 4/12/2023 1438    Comment: Educated on benefits of mobility and being OOB. Educated on safety with mobility, correct supine to sit t/f technique, correct sit<->stand t/f technique, correct gait mechanics, and progression of POC.                               User Key     Initials Effective Dates Name Provider Type Discipline    LR  02/03/23 -  Melissa Tang, PT Physical Therapist PT    BA 09/21/21 -  Wandy George PT Physical Therapist PT              PT Recommendation and Plan     Plan of Care Reviewed With: patient, son, grandchild(josse), other (see comments) (daughter-in-law)  Progress: improving  Outcome Evaluation: Improved performance and toleration to activity noted by improved level of assist with bed mobility and increased ambulation distance.  Supine to sit with SBA.  Ambulated 370ft with CGA and FWW.  Rec use of FWW for ambulation at this time for improved balance and support.  VCs for improved safety awareness throughout.  Con to demonstrate decreased functional endurance, gait instability, weakness, and decreased balance compared to baseline level of function.  Con to progress pt as able per PT POC.  Rec home with 24/7 care, OP PT, and DME of FWW upon d/c.     Time Calculation:    PT Charges     Row Name 04/15/23 1644             Time Calculation    Start Time 1602  -BA      PT Received On 04/15/23  -BA         Time Calculation- PT    Total Timed Code Minutes- PT 31 minute(s)  -BA         Timed Charges    22245 - Gait Training Minutes  15  -BA      17197 - PT Therapeutic Activity Minutes 16  -BA         Total Minutes    Timed Charges Total Minutes 31  -BA       Total Minutes 31  -BA            User Key  (r) = Recorded By, (t) = Taken By, (c) = Cosigned By    Initials Name Provider Type    Wandy Guerrero, PT Physical Therapist              Therapy Charges for Today     Code Description Service Date Service Provider Modifiers Qty    85282353643 HC GAIT TRAINING EA 15 MIN 4/15/2023 Wandy Geogre, PT GP 1    32930428874 HC PT THERAPEUTIC ACT EA 15 MIN 4/15/2023 Wandy George, PT GP 1          PT G-Codes  Outcome Measure Options: AM-PAC 6 Clicks Basic Mobility (PT)  AM-PAC 6 Clicks Score (PT): 18  AM-PAC 6 Clicks Score (OT): 21  PT Discharge Summary  Anticipated Discharge Disposition (PT): home with 24/7  care, home with outpatient therapy services    Wandy George, PT  4/15/2023

## 2023-04-15 NOTE — PROGRESS NOTES
[facility] Medicine Services  PROGRESS NOTE    Patient Name: Abdiaziz Enrique Jr.  : 1935  MRN: [id number]    Date of Admission: 2023  Primary Care Physician: System, Provider Not In    Subjective   Subjective     CC:  Follow up bacteremia    HPI:  Sleeping this AM, MRCP obtained and results pending, planned for endo today. Denies new or acute complaints, npo pending procedure    ROS:  Gen- No fevers, chills  CV- No chest pain, palpitations  Resp- No cough, dyspnea  GI- No N/V/D, abd pain     Objective   Objective     Vital Signs:   Temp:  [97.3 °F (36.3 °C)-98.4 °F (36.9 °C)] 98 °F (36.7 °C)  Heart Rate:  [54-92] 56  Resp:  [16-20] 20  BP: (122-155)/(51-73) 122/73  Flow (L/min):  [2-4] 2     Physical Exam:  Constitutional: Elderly male laying in bed, sleeping but easily awoken  HENT: NCAT, mucous membranes moist  Respiratory: Clear to auscultation bilaterally, respiratory effort normal   Cardiovascular: RRR, palpable radial pulses  Gastrointestinal: Positive bowel sounds, soft, nontender, nondistended  Musculoskeletal: No bilateral ankle edema  Psychiatric: Appropriate affect, cooperative  Neurologic: Speech clear and fluent    Results Reviewed:  LAB RESULTS:      Lab 04/15/23  0632 23  0523 23  0337 23  0332 23  0324 23  2230 23  1657   WBC 12.40* 13.13* 13.79*  --  13.88*  --  15.26*   HEMOGLOBIN 13.4 12.5* 13.1  --  13.3  --  15.1   HEMATOCRIT 41.1 37.7 39.1  --  38.7  --  43.4   PLATELETS 377 275 223  --  191  --  212   NEUTROS ABS  --   --  11.17*  --   --   --  12.40*   IMMATURE GRANS (ABS)  --   --  0.20*  --   --   --  0.08*   LYMPHS ABS  --   --  0.91  --   --   --  0.90   MONOS ABS  --   --  1.41*  --   --   --  1.82*   EOS ABS  --   --  0.06  --   --   --  0.02   MCV 94.7 95.4 94.0  --  92.1  --  91.2   SED RATE  --   --  85*  --  75*  --   --    CRP  --   --   --  23.79*  --   --  29.63*   PROCALCITONIN  --   --   --   --   --    --  3.61*   LACTATE  --   --   --  1.0  --  2.0  --          Lab 04/15/23  0632 04/14/23 0523 04/13/23 0332 04/12/23 0324 04/11/23  1657   SODIUM 143 138 136 133* 130*   POTASSIUM 4.1 3.7 3.9 3.8 3.7   CHLORIDE 110* 106 104 100 96*   CO2 24.0 23.0 22.0 20.0* 19.0*   ANION GAP 9.0 9.0 10.0 13.0 15.0   BUN 28* 39* 46* 53* 59*   CREATININE 1.47* 1.66* 1.84* 1.81* 1.99*   EGFR 45.9* 39.7* 35.0* 35.7* 31.9*   GLUCOSE 140* 157* 173* 175* 184*   CALCIUM 8.2* 8.2* 8.0* 8.1* 8.9   MAGNESIUM  --   --   --   --  2.3   HEMOGLOBIN A1C  --   --   --   --  6.90*         Lab 04/15/23  06 04/14/23 0523 04/13/23 0332 04/12/23 0324 04/11/23  1657   TOTAL PROTEIN 5.1* 5.4* 5.6* 5.0* 6.9   ALBUMIN 2.8* 2.7* 2.6* 2.7* 3.3*   GLOBULIN 2.3 2.7 3.0 2.3 3.6   ALT (SGPT) 84* 96* 106* 67* 79*   AST (SGOT) 71* 94* 179* 82* 106*   BILIRUBIN 0.5 0.6 0.7 0.7 0.9   ALK PHOS 231* 229* 223* 165* 198*   LIPASE  --   --   --  24  --          Lab 04/11/23  1657   HSTROP T 23*                 Brief Urine Lab Results  (Last result in the past 365 days)      Color   Clarity   Blood   Leuk Est   Nitrite   Protein   CREAT   Urine HCG        04/11/23 1841 Yellow   Cloudy   Large (3+)   Moderate (2+)   Negative   100 mg/dL (2+)                 Microbiology Results Abnormal     Procedure Component Value - Date/Time    Blood Culture - Blood, Arm, Right [[id number]]  (Normal) Collected: 04/11/23 0222    Lab Status: Preliminary result Specimen: Blood from Arm, Right Updated: 04/14/23 2301     Blood Culture No growth at 3 days          MRI abdomen wo contrast mrcp    Result Date: 4/15/2023  MRI ABDOMEN WO CONTRAST MRCP Date of Exam: 4/14/2023 8:20 PM EDT Indication: pancreatic lesion, elevated LFTs.  Comparison: 4/12/2023 abdomen pelvis CT scan Technique:  Routine multiplanar/multisequence images of the abdomen were obtained with MRCP sequences without contrast administration. Findings: Previous study report noted 3.6 x 2.2 cm cystic lesion of the  pancreatic head favored to be primary cystic lesion such as IPMN. Second and third portion duodenal inflammation. Also improving left obstructive uropathy. Today's multiplanar images show trace left basilar pleural fluid and atelectasis. Multiple gallstones are seen in the otherwise normal-appearing gallbladder. Liver morphology appears normal. No hepatic lesions are appreciated. The spleen is not enlarged.. 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 Patient's pancreatic head lesion is very sharply defined, measures water signal, has a slightly lobular outline and measures 3.1 x 3.4 cm. There are a few very thin internal septations, no evidence of debris or nodule. Although it cannot be evaluated for enhancement on this study, features are generally bland. No additional pancreatic lesions are seen. There is no evidence of any focal or generalized made pancreatic ductal dilatation on the multiplanar images. Bowel loops are normal in caliber. No pathologic marrow signal changes are seen. Incidental note is made of patient's moderate S-shaped thoracolumbar scoliosis. MRCP images show limited detail and most are not diagnostic. The less motion affected images show normal caliber, actually relatively small intra and extra hepatic bile ducts, maximal common duct diameter of between 2 and 2.5 mm. Pancreatic duct is similar in size, upper limits of normal, and there is mild side branch dilatation. By history, patient has current normal serum lipase. There appears to be a mildly aberrant duct in the uncinate process, likely as a variant of normal, measuring up to 4 mm in diameter where best seen, difficult to appreciate on most series. The duodenitis seen on prior study is difficult to appreciate on MR and no extensive inflammatory  change is appreciated.     Impression: Impression: 1. Water signal pancreatic head lesion, smaller uncinate process and pancreatic tail lesions, likely all representing IPMN's. The 2 smaller lesions have simple cyst appearance; the larger lesion is a multilocular cyst with very thin septations and no nodularity, septal thickening or debris. By [place] consensus guidelines it is borderline concerning by size greater than 3 cm, but otherwise no worrisome features. 2. Given the inflammatory changes in the region of the pancreatic head and duodenum on CT, the larger lesion could represent a pancreatic pseudocyst. If patient's symptoms improve/resolve, follow-up imaging in 3 months would be suggested. Otherwise, endoscopic ultrasound could be considered. 3. Cholelithiasis, without evidence of cholecystitis. No evidence of biliary ductal dilatation. 4. No other significant upper abdominal disease is appreciated elsewhere. Electronically Signed: Alfred Reyes  4/15/2023 11:07 AM EDT  Workstation ID: [id number]          Current medications:  Scheduled Meds:cefTRIAXone, 2 g, Intravenous, Q24H  [MAR Hold] pantoprazole, 40 mg, Intravenous, BID AC  [MAR Hold] senna-docusate sodium, 2 tablet, Oral, BID  [MAR Hold] sodium chloride, 10 mL, Intravenous, Q12H  [MAR Hold] tamsulosin, 0.4 mg, Oral, Daily      Continuous Infusions:   PRN Meds:.•  [MAR Hold] senna-docusate sodium **AND** [MAR Hold] polyethylene glycol **AND** [MAR Hold] bisacodyl **AND** [MAR Hold] bisacodyl  •  [MAR Hold] melatonin  •  [MAR Hold] ondansetron  •  [MAR Hold] sodium chloride  •  [MAR Hold] sodium chloride  •  [MAR Hold] sodium chloride    Assessment & Plan   Assessment & Plan     Active Hospital Problems    Diagnosis  POA   • **Gram-negative bacteremia [R78.81]  Yes   • Generalized weakness [R53.1]  Yes   • Acute UTI [N39.0]  Yes   • Elevated serum creatinine [R79.89]  Yes   • Elevated liver enzymes [R74.8]  Yes   • Hypertension [I10]  Yes   • Gout [M10.9]  Yes    • BPH (benign prostatic hyperplasia) [N40.0]  Yes   • Left obsructing nephrolithiasis [N20.0]  Yes   • Duodenitis [K29.80]  Yes      Resolved Hospital Problems   No resolved problems to display.        Brief Hospital Course to date:  Abdiaziz Enrique Jr. is a 87 y.o. male w/ BPH, HTN, nephrolithiasis, who was brought for evaluation of generalized weakness and lethargy; UA was concerning for UTI and imaging showed obstructing ureteral stone, large bladder calculus, as well as cystic appearing pancreatic lesion and duodenitis; blood and urine cultures have returned positive for Klebsiella    Assessment/Plan    Klebsiella complex UTI + bacteremia  LT ureterolithiasis w/ hydronephrosis, improved  Large bladder calculus  BPH  -repeat CT imaging shows resolution of obstructing LT ureteral stone  -seen by Uro, no immediate interventions at this time  -cont flomax  -ID follows, cont IV ceftriaxone    Duodenitis, possible duodenal diverticulum  Elevated AST/ALT/ALP  Cystic pancreatic lesion  -CT abd/pel w/ contrast shows cystic panc head lesion, poss IPMN  -MRCP obtained, results pending  -IV ppi bid  -GI follows, planning EGD today    Abnormal renal function, unknown baseline  -Cr slightly improved this AM    HTN  -HCTZ on hold w/ unknown baseline renal fxn    Expected Discharge Location and Transportation: home?  Expected Discharge   Expected Discharge Date and Time     Expected Discharge Date Expected Discharge Time    Apr 19, 2023            DVT prophylaxis:  Mechanical DVT prophylaxis orders are present.     AM-PAC 6 Clicks Score (PT): 18 (04/15/23 0800)    CODE STATUS:   Code Status and Medical Interventions:   Ordered at: 04/11/23 6911     Level Of Support Discussed With:    Patient     Code Status (Patient has no pulse and is not breathing):    CPR (Attempt to Resuscitate)     Medical Interventions (Patient has pulse or is breathing):    Full Support       Geo Paredes, DO  04/15/23

## 2023-04-15 NOTE — PLAN OF CARE
Goal Outcome Evaluation:   PT had EGD today, able to clifford diet after procedure with no C/O pain. Pt rec IV antibiotics and BID PPI. Will continue to monitor.         Progress: improving

## 2023-04-15 NOTE — ANESTHESIA PREPROCEDURE EVALUATION
Anesthesia Evaluation     Patient summary reviewed and Nursing notes reviewed                Airway   Mallampati: II  TM distance: >3 FB  Neck ROM: full  No difficulty expected  Dental - normal exam   (+) poor dentition    Pulmonary - normal exam   (+) a smoker Former,   Cardiovascular - normal exam    (+) hypertension, dysrhythmias PAC,       Neuro/Psych- negative ROS  GI/Hepatic/Renal/Endo    (+)   liver disease history of elevated LFT, renal disease CRI, diabetes mellitus,     ROS Comment: Pancreatic cyst    Musculoskeletal (-) negative ROS    Abdominal  - normal exam    Bowel sounds: normal.   Substance History - negative use     OB/GYN negative ob/gyn ROS         Other                      Anesthesia Plan    ASA 3     general     (propofol)  intravenous induction     Anesthetic plan, risks, benefits, and alternatives have been provided, discussed and informed consent has been obtained with: patient.    Plan discussed with CRNA.        CODE STATUS:    Level Of Support Discussed With: Patient  Code Status (Patient has no pulse and is not breathing): CPR (Attempt to Resuscitate)  Medical Interventions (Patient has pulse or is breathing): Full Support

## 2023-04-15 NOTE — PLAN OF CARE
Problem: Fall Injury Risk  Goal: Absence of Fall and Fall-Related Injury  Intervention: Promote Injury-Free Environment  Recent Flowsheet Documentation  Taken 4/15/2023 0410 by Dorothy Landry RN  Safety Promotion/Fall Prevention:   activity supervised   assistive device/personal items within reach   safety round/check completed  Taken 4/15/2023 0210 by Dorothy Landry RN  Safety Promotion/Fall Prevention:   activity supervised   assistive device/personal items within reach   safety round/check completed  Taken 4/15/2023 0010 by Dorothy Landry RN  Safety Promotion/Fall Prevention:   activity supervised   assistive device/personal items within reach   safety round/check completed  Taken 4/14/2023 2210 by Dorothy Landry RN  Safety Promotion/Fall Prevention:   activity supervised   assistive device/personal items within reach   safety round/check completed  Taken 4/14/2023 2000 by Dorothy Landry RN  Safety Promotion/Fall Prevention: patient off unit     Problem: Adult Inpatient Plan of Care  Goal: Absence of Hospital-Acquired Illness or Injury  Intervention: Identify and Manage Fall Risk  Recent Flowsheet Documentation  Taken 4/15/2023 0410 by Dorothy Landry RN  Safety Promotion/Fall Prevention:   activity supervised   assistive device/personal items within reach   safety round/check completed  Taken 4/15/2023 0210 by Dorothy Landry RN  Safety Promotion/Fall Prevention:   activity supervised   assistive device/personal items within reach   safety round/check completed  Taken 4/15/2023 0010 by Dorothy Landry RN  Safety Promotion/Fall Prevention:   activity supervised   assistive device/personal items within reach   safety round/check completed  Taken 4/14/2023 2210 by Dorothy Landry RN  Safety Promotion/Fall Prevention:   activity supervised   assistive device/personal items within reach   safety round/check completed  Taken 4/14/2023 2000 by Dorothy Landry RN  Safety Promotion/Fall Prevention: patient off unit     Problem: Adult  Inpatient Plan of Care  Goal: Absence of Hospital-Acquired Illness or Injury  Intervention: Prevent Skin Injury  Recent Flowsheet Documentation  Taken 4/15/2023 0410 by Dorothy Landry RN  Body Position: position changed independently  Taken 4/15/2023 0210 by Dorothy Landry RN  Body Position: position changed independently  Taken 4/15/2023 0010 by Dorothy Landry RN  Body Position: position changed independently  Taken 4/14/2023 2210 by Dorothy Landry RN  Body Position: position changed independently     Problem: Adult Inpatient Plan of Care  Goal: Absence of Hospital-Acquired Illness or Injury  Intervention: Prevent and Manage VTE (Venous Thromboembolism) Risk  Recent Flowsheet Documentation  Taken 4/15/2023 0410 by Dorothy Landry RN  Activity Management: activity encouraged  Taken 4/15/2023 0210 by Dorothy Landry RN  Activity Management: activity encouraged  Taken 4/15/2023 0010 by Dorothy Landry RN  Activity Management: activity encouraged  Taken 4/14/2023 2210 by Dorothy Landry RN  Activity Management: activity encouraged   Goal Outcome Evaluation:           Progress: no change

## 2023-04-15 NOTE — ANESTHESIA POSTPROCEDURE EVALUATION
Patient: Abdiaziz Enrique Jr.    Procedure Summary     Date: 04/15/23 Room / Location:  [facility]    Anesthesia Start: 1200 Anesthesia Stop: 1227    Procedure: ESOPHAGOGASTRODUODENOSCOPY Diagnosis:       Duodenitis      (Duodenitis [K29.80])    Surgeons: Brunner, Mark I, MD Provider: Allan Jackson MD    Anesthesia Type: general ASA Status: 3          Anesthesia Type: general    Vitals  Vitals Value Taken Time   BP     Temp     Pulse 56 04/15/23 1226   Resp     SpO2 98 % 04/15/23 1226   Vitals shown include unvalidated device data.        Post Anesthesia Care and Evaluation    Patient location during evaluation: PACU  Patient participation: complete - patient participated  Level of consciousness: awake and alert  Pain management: adequate    Airway patency: patent  Anesthetic complications: No anesthetic complications  PONV Status: none  Cardiovascular status: hemodynamically stable and acceptable  Respiratory status: nonlabored ventilation, acceptable and nasal cannula  Hydration status: acceptable

## 2023-04-15 NOTE — PLAN OF CARE
Goal Outcome Evaluation:  Plan of Care Reviewed With: patient, son, grandchild(josse), other (see comments) (daughter-in-law)        Progress: improving  Outcome Evaluation: Improved performance and toleration to activity noted by improved level of assist with bed mobility and increased ambulation distance.  Supine to sit with SBA.  Ambulated 370ft with CGA and FWW.  Rec use of FWW for ambulation at this time for improved balance and support.  VCs for improved safety awareness throughout.  Con to demonstrate decreased functional endurance, gait instability, weakness, and decreased balance compared to baseline level of function.  Con to progress pt as able per PT POC.  Rec home with 24/7 care, OP PT, and DME of FWW upon d/c.

## 2023-04-15 NOTE — PROGRESS NOTES
Abdiaziz Enrique .  1935  [id number]      Chief Complaint: fatigue    Reason for Consultation: bactermic urinary infection    History of present illness:     Patient is a 87 y.o.  Yr old male not a great historian with respect to past medical care, daughter is present to give information, with history of renal calculi/bladder calculi and prior care at the [facility], has had cystoscopy previously and a prior stent although the specifics of that are not clear.  Daughter had been out of town, reports returning to town and finding her dad confused, lethargic and poor p.o. intake with dysuria; diagnosed with acute UTI and abnormal CT scan with concern for obstructing stone at the left ureter/bilateral nephrolithiasis in addition to severe wall edema involving the duodenum and possibility for large inflamed duodenal diverticulum and possible adjacent abscess on April 11.  Repeat CT scan April 12 with obstructing calculus in the mid left ureter resolved, mild left hydronephrosis/moderate left proximal ureteral dilation improved per radiology with large calculus in the bladder.  Duodenitis noted with cystic lesion in the pancreatic head but no other description of abscess.  Surgery has seen him.    4/15/23 seen early and sleepy ;  MRI pending; Recent dysuria improved.  Denies any current abdominal pain.  Appetite diminished but denies overt vomiting/diarrhea and no hematochezia melena or hematemesis.  He has pure wick in place has had previous urinary urgency/frequency but denies having to strain.  No hematuria or pyuria.  Currently no flank pain    Daughter reports that his mental status has improved.  Back to baseline.  No headache photophobia or neck stiffness.  No shortness of breath cough or hemoptysis.  No skin rash      Past Medical History:   Diagnosis Date   • Bladder stone    • BPH (benign prostatic hyperplasia) 04/11/2023   • Gout 04/11/2023   • Hypertension 04/11/2023       Past Surgical History:  "  Procedure Laterality Date   • CYSTOSCOPY     • CYSTOSCOPY W/ URETERAL STENT PLACEMENT     • TONSILLECTOMY         Pediatric History   Patient Parents   • Not on file     Other Topics Concern   • Not on file   Social History Narrative   • Not on file       family history includes Alcohol abuse in his father; COPD in his mother; Cerebral aneurysm in his father; Dementia in his mother.    No Known Allergies     Review of Systems    4/15/23     Constitutional--fever better with no chills or sweats.  Appetite improving with fatigue.  Heent-- No new vision, hearing or throat complaints.  No epistaxis or oral sores.  Denies odynophagia or dysphagia.  No flashers, floaters or eye pain. No odynophagia or dysphagia. No headache, photophobia or neck stiffness.  CV-- No chest pain, palpitation or syncope  Resp-- No SOB/cough/Hemoptysis  GI-see above.   No nausea, vomiting, or diarrhea.  No hematochezia, melena, or hematemesis. Denies jaundice or chronic liver disease.  --see above  Lymph- no swollen lymph nodes in neck/axilla or groin.   Heme- No active bruising or bleeding; no Hx of DVT or PE.  MS-- no swelling or pain in the bones or joints of arms/legs.  No new back pain.  Neuro-- No acute focal weakness or numbness in the arms or legs.  No seizures.    Full 12 point review of systems reviewed and negative otherwise for acute complaints, except for above    Physical Exam:   Vital Signs   /67 (BP Location: Left arm, Patient Position: Lying)   Pulse 67   Temp 98.4 °F (36.9 °C) (Oral)   Resp 18   Ht 172.7 cm (68\")   Wt 81.6 kg (180 lb)   SpO2 95%   BMI 27.37 kg/m²     GENERAL: sleepy, in no acute distress.  Chronically ill-appearing  HEENT: Normocephalic, atraumatic. No conjunctival injection. No icterus. Oropharynx clear without evidence of thrush or exudate. No evidence of peridontal disease.    NECK: Supple without nuchal rigidity. No mass.  HEART: RRR; No murmur, rubs, gallops.   LUNGS: Clear to " auscultation bilaterally without wheezing, rales, rhonchi. Normal respiratory effort. Nonlabored. No dullness.  ABDOMEN: Soft, nontender, nondistended. Positive bowel sounds. No rebound or guarding. NO mass or HSM.  EXT:  No cyanosis, clubbing or edema. No cord.  MSK: FROM without joint effusions noted arms/legs.    SKIN: Warm and dry without cutaneous eruptions on Inspection/palpation.    NEURO: sleepy    No peripheral stigmata/phenomena of endocarditis    IV without obvious redness or drainage    Laboratory Data    Results from last 7 days   Lab Units 04/15/23  0632 04/14/23  0523 04/13/23  0337   WBC 10*3/mm3 12.40* 13.13* 13.79*   HEMOGLOBIN g/dL 13.4 12.5* 13.1   HEMATOCRIT % 41.1 37.7 39.1   PLATELETS 10*3/mm3 377 275 223     Results from last 7 days   Lab Units 04/15/23  0632   SODIUM mmol/L 143   POTASSIUM mmol/L 4.1   CHLORIDE mmol/L 110*   CO2 mmol/L 24.0   BUN mg/dL 28*   CREATININE mg/dL 1.47*   GLUCOSE mg/dL 140*   CALCIUM mg/dL 8.2*     Results from last 7 days   Lab Units 04/15/23  0632   ALK PHOS U/L 231*   BILIRUBIN mg/dL 0.5   ALT (SGPT) U/L 84*   AST (SGOT) U/L 71*     Results from last 7 days   Lab Units 04/13/23  0337   SED RATE mm/hr 85*     Results from last 7 days   Lab Units 04/13/23  0332   CRP mg/dL 23.79*       Estimated Creatinine Clearance: 40.9 mL/min (A) (by C-G formula based on SCr of 1.47 mg/dL (H)).      Microbiology:      Radiology:  Imaging Results (Last 72 Hours)     Procedure Component Value Units Date/Time    MRI abdomen wo contrast mrcp [[id number]] Resulted: 04/14/23 2054     Updated: 04/14/23 2126    CT Abdomen Pelvis With Contrast [[id number]] Collected: 04/12/23 1407     Updated: 04/12/23 1434    Narrative:      CT ABDOMEN PELVIS W CONTRAST    Date of Exam: 4/12/2023 1:26 PM EDT    Indication: Abdominal pain, acute, nonlocalized, recent abdominal CT without contrast demonstrating possible diverticular abscess associated with the duodenum     Comparison: CT abdomen and  pelvis without contrast 4/11/2023    Technique: Axial CT images were obtained of the abdomen and pelvis following the uneventful intravenous administration of 95 mL Isovue 300. Reconstructed coronal and sagittal images were also obtained. Automated exposure control and iterative   construction methods were used.    Findings:  Lung bases without consolidation. The visualized pulmonary arteries are well-opacified with contrast. Heart size normal. Small left pleural effusion stable from prior study with minimal left basilar atelectasis.    The liver is normal in size and contour. Gallbladder present with cholelithiasis noted. No pericholecystic inflammation. The adrenal glands are normal. The spleen is normal in size. Scattered areas of fatty replacement of pancreatic parenchyma. At the   pancreatic head there is a low-attenuation cystic lesion measuring 3.6 x 2.2 cm. No upstream main pancreatic duct dilatation. Normal caliber common bile duct.    Kidneys are symmetric in size. Previously seen 6 mm obstructing calculus in mid left ureter has resolved. There is mild left urothelial hyperemia and thickening at this level the ureter which relate to inflammation. There are 2 nonobstructing calculi at   the left kidney measuring 7 mm and 8 mm which are stable. Mild left hydronephrosis and moderate left renal pelvic dilatation and proximal ureteral dilatation slightly decreased from prior study. Multiple nonobstructing right-sided renal calculi noted for   example at the lower pole measuring 16 mm and mid left kidney measuring 14 mm. Duplicated right renal collecting system. No right ureteral calculus. Large calculus dependently in bladder measures 17 mm, unchanged. Prostatomegaly.    Negative for pneumoperitoneum. No bowel obstruction. The appendix is normal. There is hyperemia and inflammatory stranding surrounding the second and third portions of the duodenum suggesting duodenitis. No intramural abscess.     The portal  vein, splenic vein, superior mesenteric vein are patent. Moderate vascular calcifications of the abdominal aorta with ectasia of infrarenal aorta measuring up to 2.8 x 2.4 cm. Moderate convex right dextrocurvature of the lumbar spine centered   at L3. No aggressive osseous lesion or fracture. 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      Impression:      Impression:  1. Obstructing calculus in mid left ureter resolved. Mild left hydronephrosis and moderate left proximal ureter dilatation slightly improved. Bilateral nonobstructing nephrolithiasis with large calculus in bladder.  2. Inflammatory stranding surrounding second and third portion duodenum consistent with duodenitis.  3. 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  4. Stable small left pleural effusion.  5. Cholelithiasis, prostatomegaly, and chronic findings above.    Electronically Signed: Gurinder Jeffers    4/12/2023 2:30 PM EDT    Workstation ID: [id number]            Impression:     --Acute Klebsiella bacteremia/septicemia, urinary source associated with renal/bladder calculi, ongoing ceftriaxone.  Urology following for further functional/anatomic assessment and further intervention at their discretion.    --Renal/bladder calculi.  Left side hydronephrosis improved and subsequent scan and urology following as above.    --Abnormal imaging with concern for duodenitis and pancreatic cystic lesion.  Supportive treatment ongoing by medicine/surgery team, proton pump inhibition and they are testing for H. pylori at their discretion; treatment per them; MRI pending for further clarification; initial scan had raised some concern for possible abscess but subsequent scan less convincing per radiology and instead description of cystic lesion as above.  If stronger evidence for progressive intra-abdominal  infection despite current therapy, you could consider empirically broader therapy; he reports improvements with current therapy    --Abnormal creatinine.  Baseline unclear and needs further clarification by medicine team.  Monitor to help guide future adjustments and antimicrobials    --Abnormal LFTs.  Chronicity unclear.  Monitor        PLAN:       -- IV ceftriaxone    -- Check/review labs cultures and scans    -- Partial history per nursing staff    -- Discussed with microbiology    -- Discussed with pharmacy    -- Further imaging pending as per surgical team    -- Highly complex set of issues with high risk for further serious morbidity and other serious sequela    **Copied text in this note has been reviewed and is accurate as of 04/15/23.        Justice Mcmillan MD  4/15/2023

## 2023-04-15 NOTE — BRIEF OP NOTE
ESOPHAGOGASTRODUODENOSCOPY  Progress Note    Abdiaziz Enrique Jr.  4/15/2023    EGD shows multiple ulcers in the duodenal bulb and second portion duodenum.  The largest ulcers are in the distal bulb measuring up to 25 mm.  The second portion duodenum ulcers are mainly linear.  There is scant oozing, but no visible vessels.    >> Twice daily PPI for 1 month, then daily.  >> Await H. pylori biopsy.  >> Avoid NSAIDs.    Called patient's daughter to discuss findings, but there was no answer.    Mark I. Brunner, MD     Date: 4/15/2023  Time: 12:55 EDT

## 2023-04-16 LAB
ANION GAP SERPL CALCULATED.3IONS-SCNC: 10 MMOL/L (ref 5–15)
BACTERIA SPEC AEROBE CULT: NORMAL
BUN SERPL-MCNC: 26 MG/DL (ref 8–23)
BUN/CREAT SERPL: 19.4 (ref 7–25)
CALCIUM SPEC-SCNC: 8.3 MG/DL (ref 8.6–10.5)
CHLORIDE SERPL-SCNC: 109 MMOL/L (ref 98–107)
CO2 SERPL-SCNC: 24 MMOL/L (ref 22–29)
CREAT SERPL-MCNC: 1.34 MG/DL (ref 0.76–1.27)
EGFRCR SERPLBLD CKD-EPI 2021: 51.3 ML/MIN/1.73
GLUCOSE BLDC GLUCOMTR-MCNC: 114 MG/DL (ref 70–130)
GLUCOSE BLDC GLUCOMTR-MCNC: 142 MG/DL (ref 70–130)
GLUCOSE SERPL-MCNC: 233 MG/DL (ref 65–99)
POTASSIUM SERPL-SCNC: 4.4 MMOL/L (ref 3.5–5.2)
SODIUM SERPL-SCNC: 143 MMOL/L (ref 136–145)

## 2023-04-16 PROCEDURE — 80048 BASIC METABOLIC PNL TOTAL CA: CPT | Performed by: INTERNAL MEDICINE

## 2023-04-16 PROCEDURE — 99232 SBSQ HOSP IP/OBS MODERATE 35: CPT | Performed by: INTERNAL MEDICINE

## 2023-04-16 PROCEDURE — 25010000002 CEFTRIAXONE PER 250 MG: Performed by: INTERNAL MEDICINE

## 2023-04-16 PROCEDURE — 82962 GLUCOSE BLOOD TEST: CPT

## 2023-04-16 RX ORDER — DEXTROSE MONOHYDRATE 25 G/50ML
25 INJECTION, SOLUTION INTRAVENOUS
Status: DISCONTINUED | OUTPATIENT
Start: 2023-04-16 | End: 2023-04-19 | Stop reason: HOSPADM

## 2023-04-16 RX ORDER — NICOTINE POLACRILEX 4 MG
15 LOZENGE BUCCAL
Status: DISCONTINUED | OUTPATIENT
Start: 2023-04-16 | End: 2023-04-19 | Stop reason: HOSPADM

## 2023-04-16 RX ORDER — INSULIN LISPRO 100 [IU]/ML
0-7 INJECTION, SOLUTION INTRAVENOUS; SUBCUTANEOUS
Status: DISCONTINUED | OUTPATIENT
Start: 2023-04-16 | End: 2023-04-19 | Stop reason: HOSPADM

## 2023-04-16 RX ORDER — AMLODIPINE BESYLATE 5 MG/1
5 TABLET ORAL
Status: DISCONTINUED | OUTPATIENT
Start: 2023-04-16 | End: 2023-04-19 | Stop reason: HOSPADM

## 2023-04-16 RX ADMIN — Medication 10 ML: at 20:02

## 2023-04-16 RX ADMIN — AMLODIPINE BESYLATE 5 MG: 5 TABLET ORAL at 12:39

## 2023-04-16 RX ADMIN — PANTOPRAZOLE SODIUM 40 MG: 40 INJECTION, POWDER, LYOPHILIZED, FOR SOLUTION INTRAVENOUS at 09:37

## 2023-04-16 RX ADMIN — TAMSULOSIN HYDROCHLORIDE 0.4 MG: 0.4 CAPSULE ORAL at 09:37

## 2023-04-16 RX ADMIN — PANTOPRAZOLE SODIUM 40 MG: 40 INJECTION, POWDER, LYOPHILIZED, FOR SOLUTION INTRAVENOUS at 17:29

## 2023-04-16 RX ADMIN — CEFTRIAXONE 2 G: 2 INJECTION, POWDER, FOR SOLUTION INTRAMUSCULAR; INTRAVENOUS at 09:41

## 2023-04-16 RX ADMIN — Medication 10 ML: at 09:36

## 2023-04-16 NOTE — PLAN OF CARE
Problem: Fall Injury Risk  Goal: Absence of Fall and Fall-Related Injury  Intervention: Promote Injury-Free Environment  Recent Flowsheet Documentation  Taken 4/16/2023 0415 by Dorothy Landry RN  Safety Promotion/Fall Prevention:   activity supervised   assistive device/personal items within reach   safety round/check completed  Taken 4/16/2023 0215 by Dorothy Landry RN  Safety Promotion/Fall Prevention:   activity supervised   assistive device/personal items within reach   safety round/check completed  Taken 4/16/2023 0015 by Dorothy Landry RN  Safety Promotion/Fall Prevention:   activity supervised   assistive device/personal items within reach   safety round/check completed  Taken 4/15/2023 2220 by Dorothy Landry RN  Safety Promotion/Fall Prevention:   activity supervised   assistive device/personal items within reach   safety round/check completed  Taken 4/15/2023 2015 by Dorothy Landry RN  Safety Promotion/Fall Prevention:   activity supervised   assistive device/personal items within reach   safety round/check completed     Problem: Adult Inpatient Plan of Care  Goal: Absence of Hospital-Acquired Illness or Injury  Intervention: Identify and Manage Fall Risk  Recent Flowsheet Documentation  Taken 4/16/2023 0415 by Dorothy Landry RN  Safety Promotion/Fall Prevention:   activity supervised   assistive device/personal items within reach   safety round/check completed  Taken 4/16/2023 0215 by Dorothy Landry RN  Safety Promotion/Fall Prevention:   activity supervised   assistive device/personal items within reach   safety round/check completed  Taken 4/16/2023 0015 by Dorothy Landry RN  Safety Promotion/Fall Prevention:   activity supervised   assistive device/personal items within reach   safety round/check completed  Taken 4/15/2023 2220 by Dorothy Landry RN  Safety Promotion/Fall Prevention:   activity supervised   assistive device/personal items within reach   safety round/check completed  Taken 4/15/2023 2015 by  Brown, Dorothy, RN  Safety Promotion/Fall Prevention:   activity supervised   assistive device/personal items within reach   safety round/check completed     Problem: Adult Inpatient Plan of Care  Goal: Absence of Hospital-Acquired Illness or Injury  Intervention: Prevent Skin Injury  Recent Flowsheet Documentation  Taken 4/16/2023 0415 by Dorothy Landry RN  Body Position: position changed independently  Taken 4/16/2023 0215 by Dorothy Landry RN  Body Position: position changed independently  Taken 4/16/2023 0015 by Dorothy Landry RN  Body Position: position changed independently  Skin Protection:   adhesive use limited   incontinence pads utilized  Taken 4/15/2023 2220 by Dorothy Landry RN  Body Position: position changed independently  Skin Protection:   adhesive use limited   incontinence pads utilized  Taken 4/15/2023 2015 by Dorothy Landry RN  Body Position: position changed independently  Skin Protection:   adhesive use limited   incontinence pads utilized     Problem: Adult Inpatient Plan of Care  Goal: Absence of Hospital-Acquired Illness or Injury  Intervention: Prevent and Manage VTE (Venous Thromboembolism) Risk  Recent Flowsheet Documentation  Taken 4/16/2023 0415 by Dorothy Landry RN  Activity Management: activity encouraged  Taken 4/16/2023 0215 by Dorothy Landry RN  Activity Management: activity encouraged  Taken 4/16/2023 0015 by Dorothy Landry RN  Activity Management: activity encouraged  Taken 4/15/2023 2220 by Dorothy Landry RN  Activity Management: activity encouraged  Taken 4/15/2023 2015 by Dorothy Landry RN  Activity Management: activity encouraged     Problem: Skin Injury Risk Increased  Goal: Skin Health and Integrity  Intervention: Optimize Skin Protection  Recent Flowsheet Documentation  Taken 4/16/2023 0415 by Dorothy Landry RN  Head of Bed (HOB) Positioning: HOB elevated  Taken 4/16/2023 0215 by Dorothy Landry RN  Head of Bed (HOB) Positioning: HOB elevated  Taken 4/16/2023 0015 by Dorothy Landry  RN  Pressure Reduction Techniques: frequent weight shift encouraged  Head of Bed (HOB) Positioning: HOB elevated  Pressure Reduction Devices: positioning supports utilized  Skin Protection:   adhesive use limited   incontinence pads utilized  Taken 4/15/2023 2220 by Dorothy Landry RN  Pressure Reduction Techniques: frequent weight shift encouraged  Head of Bed (HOB) Positioning: HOB elevated  Pressure Reduction Devices: positioning supports utilized  Skin Protection:   adhesive use limited   incontinence pads utilized  Taken 4/15/2023 2015 by Dorothy Landry RN  Pressure Reduction Techniques: frequent weight shift encouraged  Head of Bed (HOB) Positioning: [facility] elevated  Pressure Reduction Devices: positioning supports utilized  Skin Protection:   adhesive use limited   incontinence pads utilized   Goal Outcome Evaluation:           Progress: improving

## 2023-04-16 NOTE — PROGRESS NOTES
"GI Daily Progress Note  Subjective:    Chief Complaint:  F/u duodenal ulcers and pancreas cysts    The patient has no complaints. Appetite is good and eating well. Denies nausea or abdominal pain. No visible blood in stool.    Objective:    /65   Pulse 77   Temp 97.8 °F (36.6 °C) (Oral)   Resp 19   Ht 172.7 cm (68\")   Wt 81.6 kg (180 lb)   SpO2 97%   BMI 27.37 kg/m²     Physical Exam  Vitals and nursing note reviewed.   Constitutional:       General: He is not in acute distress.     Appearance: He is ill-appearing. He is not toxic-appearing or diaphoretic.   HENT:      Head: Normocephalic.      Comments: Hard of hearing.     Nose: Nose normal. No congestion.      Mouth/Throat:      Mouth: Mucous membranes are moist.      Pharynx: No oropharyngeal exudate.   Eyes:      General: No scleral icterus.     Conjunctiva/sclera: Conjunctivae normal.   Cardiovascular:      Rate and Rhythm: Normal rate.      Pulses: Normal pulses.   Pulmonary:      Effort: Pulmonary effort is normal. No respiratory distress.      Breath sounds: Normal breath sounds. No stridor. No wheezing or rales.   Abdominal:      General: Bowel sounds are normal. There is no distension.      Palpations: Abdomen is soft.      Tenderness: There is no abdominal tenderness. There is no guarding.   Genitourinary:     Comments: deferred  Musculoskeletal:      Cervical back: Normal range of motion.      Right lower leg: No edema.      Left lower leg: No edema.   Skin:     General: Skin is warm and dry.      Capillary Refill: Capillary refill takes less than 2 seconds.      Coloration: Skin is not jaundiced or pale.      Findings: No erythema or rash.   Neurological:      General: No focal deficit present.      Mental Status: He is alert and oriented to person, place, and time.   Psychiatric:         Mood and Affect: Mood normal.         Behavior: Behavior normal.         Lab  Lab Results   Component Value Date    WBC 12.40 (H) 04/15/2023    HGB 13.4 " 04/15/2023    HGB 12.5 (L) 04/14/2023    HGB 13.1 04/13/2023    MCV 94.7 04/15/2023     04/15/2023       Lab Results   Component Value Date    GLUCOSE 233 (H) 04/16/2023    BUN 26 (H) 04/16/2023    CREATININE 1.34 (H) 04/16/2023    BCR 19.4 04/16/2023     04/16/2023    K 4.4 04/16/2023    CO2 24.0 04/16/2023    CALCIUM 8.3 (L) 04/16/2023    ALBUMIN 2.8 (L) 04/15/2023    ALKPHOS 231 (H) 04/15/2023    BILITOT 0.5 04/15/2023    ALT 84 (H) 04/15/2023    AST 71 (H) 04/15/2023       Assessment:    1. Acute duodenal ulcers without hemorrhage, mainly atop the valvulae conniventes and compatible with ischemic enteropathy precipitated by recent urosepsis/hypotension. Denies use of NSAIDs.  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    Plan:    >> BID PPI for 1 month, then daily for 1 month.  >> Will communicate H pylori biopsy results to patient, if positive.  >> Due to age, do not recommend future surveillance of IPMNs.  >> Very mild recurrent focal interstitial pancreatitis is common in association with IPMN's, and may often be seen on imaging/labs without significant symptoms.    Discussed at length with patient and his daughter present in the room.    Will sign off. Please call with questions or concerns.    Mark I. Brunner, MD  04/16/23  17:28 EDT

## 2023-04-16 NOTE — PROGRESS NOTES
"Abdiaziz Enrique .  1935  [id number]      Chief Complaint: fatigue    Reason for Consultation: bactermic urinary infection    History of present illness:     Patient is a 87 y.o.  Yr old male not a great historian with respect to past medical care, daughter is present to give information, with history of renal calculi/bladder calculi and prior care at the [facility], has had cystoscopy previously and a prior stent although the specifics of that are not clear.  Daughter had been out of town, reports returning to town and finding her dad confused, lethargic and poor p.o. intake with dysuria; diagnosed with acute UTI and abnormal CT scan with concern for obstructing stone at the left ureter/bilateral nephrolithiasis in addition to severe wall edema involving the duodenum and possibility for large inflamed duodenal diverticulum and possible adjacent abscess on April 11.  Repeat CT scan April 12 with obstructing calculus in the mid left ureter resolved, mild left hydronephrosis/moderate left proximal ureteral dilation improved per radiology with large calculus in the bladder.  Duodenitis noted with cystic lesion in the pancreatic head but no other description of abscess.  Surgery has seen him.    4/15/23 had EGD and abd MRI;  \"EGD shows multiple ulcers in the duodenal bulb and second portion duodenum\" per GI    4/16/23 seen early and sleepy ;   Recent dysuria improved.  Denies any current abdominal pain.  Appetite diminished but denies overt vomiting/diarrhea and no hematochezia melena or hematemesis.  He has pure wick in place has had previous urinary urgency/frequency but denies having to strain.  No hematuria or pyuria.  Currently no flank pain    Daughter reports that his mental status has improved.  Back to baseline.  No headache photophobia or neck stiffness.  No shortness of breath cough or hemoptysis.  No skin rash      Past Medical History:   Diagnosis Date   • Bladder stone    • BPH (benign " "prostatic hyperplasia) 04/11/2023   • Gout 04/11/2023   • Hypertension 04/11/2023       Past Surgical History:   Procedure Laterality Date   • CYSTOSCOPY     • CYSTOSCOPY W/ URETERAL STENT PLACEMENT     • TONSILLECTOMY         Pediatric History   Patient Parents   • Not on file     Other Topics Concern   • Not on file   Social History Narrative   • Not on file       family history includes Alcohol abuse in his father; COPD in his mother; Cerebral aneurysm in his father; Dementia in his mother.    No Known Allergies     Review of Systems    4/16/23  Per nursing also    Constitutional--fever better with no chills or sweats.  Appetite improving with fatigue.  Heent-- No new vision, hearing or throat complaints.  No epistaxis or oral sores.  Denies odynophagia or dysphagia.  No flashers, floaters or eye pain. No odynophagia or dysphagia. No headache, photophobia or neck stiffness.  CV-- No chest pain, palpitation or syncope  Resp-- No SOB/cough/Hemoptysis  GI-see above.   No nausea, vomiting, or diarrhea.  No hematochezia, melena, or hematemesis. Denies jaundice or chronic liver disease.  --see above  Lymph- no swollen lymph nodes in neck/axilla or groin.   Heme- No active bruising or bleeding; no Hx of DVT or PE.  MS-- no swelling or pain in the bones or joints of arms/legs.  No new back pain.  Neuro-- No acute focal weakness or numbness in the arms or legs.  No seizures.    Full 12 point review of systems reviewed and negative otherwise for acute complaints, except for above    Physical Exam:   Vital Signs   /56 (BP Location: Right arm, Patient Position: Lying)   Pulse 69   Temp 98.4 °F (36.9 °C) (Oral)   Resp 16   Ht 172.7 cm (68\")   Wt 81.6 kg (180 lb)   SpO2 96%   BMI 27.37 kg/m²     GENERAL: sleepy, in no acute distress.  Chronically ill-appearing  HEENT: Normocephalic, atraumatic. No conjunctival injection. No icterus. Oropharynx clear without evidence of thrush or exudate. No evidence of " peridontal disease.    NECK: Supple without nuchal rigidity. No mass.  HEART: RRR; No murmur, rubs, gallops.   LUNGS: Clear to auscultation bilaterally without wheezing, rales, rhonchi. Normal respiratory effort. Nonlabored. No dullness.  ABDOMEN: Soft, nontender, nondistended. Positive bowel sounds. No rebound or guarding. NO mass or HSM.  EXT:  No cyanosis, clubbing or edema. No cord.  MSK: FROM without joint effusions noted arms/legs.    SKIN: Warm and dry without cutaneous eruptions on Inspection/palpation.    NEURO: sleepy    No peripheral stigmata/phenomena of endocarditis    IV without obvious redness or drainage    Laboratory Data    Results from last 7 days   Lab Units 04/15/23  0632 04/14/23  0523 04/13/23  0337   WBC 10*3/mm3 12.40* 13.13* 13.79*   HEMOGLOBIN g/dL 13.4 12.5* 13.1   HEMATOCRIT % 41.1 37.7 39.1   PLATELETS 10*3/mm3 377 275 223     Results from last 7 days   Lab Units 04/15/23  0632   SODIUM mmol/L 143   POTASSIUM mmol/L 4.1   CHLORIDE mmol/L 110*   CO2 mmol/L 24.0   BUN mg/dL 28*   CREATININE mg/dL 1.47*   GLUCOSE mg/dL 140*   CALCIUM mg/dL 8.2*     Results from last 7 days   Lab Units 04/15/23  0632   ALK PHOS U/L 231*   BILIRUBIN mg/dL 0.5   ALT (SGPT) U/L 84*   AST (SGOT) U/L 71*     Results from last 7 days   Lab Units 04/13/23  0337   SED RATE mm/hr 85*     Results from last 7 days   Lab Units 04/13/23  0332   CRP mg/dL 23.79*       Estimated Creatinine Clearance: 40.9 mL/min (A) (by C-G formula based on SCr of 1.47 mg/dL (H)).      Microbiology:      Radiology:  Imaging Results (Last 72 Hours)     Procedure Component Value Units Date/Time    MRI abdomen wo contrast mrcp [[id number]] Collected: 04/15/23 1042     Updated: 04/15/23 1110    Narrative:      MRI ABDOMEN WO CONTRAST MRCP    Date of Exam: 4/14/2023 8:20 PM EDT    Indication: pancreatic lesion, elevated LFTs.     Comparison: 4/12/2023 abdomen pelvis CT scan    Technique:  Routine multiplanar/multisequence images of the abdomen  were obtained with MRCP sequences without contrast administration.    Findings:   Previous study report noted 3.6 x 2.2 cm cystic lesion of the pancreatic head favored to be primary cystic lesion such as IPMN. Second and third portion duodenal inflammation. Also improving left obstructive uropathy.    Today's multiplanar images show trace left basilar pleural fluid and atelectasis. Multiple gallstones are seen in the otherwise normal-appearing gallbladder. Liver morphology appears normal. No hepatic lesions are appreciated. The spleen is not   enlarged..    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 Patient's   pancreatic head lesion is very sharply defined, measures water signal, has a slightly lobular outline and measures 3.1 x 3.4 cm. There are a few very thin internal septations, no evidence of debris or nodule. Although it cannot be evaluated for   enhancement on this study, features are generally bland.     No additional pancreatic lesions are seen. There is no evidence of any focal or generalized made pancreatic ductal dilatation on the multiplanar images. Bowel loops are normal in caliber. No pathologic marrow signal changes are seen. Incidental note is   made of patient's moderate S-shaped thoracolumbar scoliosis.    MRCP images show limited detail and most are not diagnostic. The less motion affected images show normal caliber, actually relatively small intra and extra hepatic bile ducts, maximal common duct diameter of between 2 and 2.5 mm. Pancreatic duct is   similar in size, upper limits of normal, and there is mild side branch dilatation. By history, patient has current normal serum lipase.    There appears to be a mildly aberrant duct in the uncinate process, likely as a variant of normal, measuring up to 4  mm in diameter where best seen, difficult to appreciate on most series. The duodenitis seen on prior study is difficult to appreciate on   MR and no extensive inflammatory change is appreciated.          Impression:      Impression:     1. Water signal pancreatic head lesion, smaller uncinate process and pancreatic tail lesions, likely all representing IPMN's. The 2 smaller lesions have simple cyst appearance; the larger lesion is a multilocular cyst with very thin septations and no   nodularity, septal thickening or debris. By Fukuoka consensus guidelines it is borderline concerning by size greater than 3 cm, but otherwise no worrisome features.    2. Given the inflammatory changes in the region of the pancreatic head and duodenum on CT, the larger lesion could represent a pancreatic pseudocyst. If patient's symptoms improve/resolve, follow-up imaging in 3 months would be suggested. Otherwise,   endoscopic ultrasound could be considered.     3. Cholelithiasis, without evidence of cholecystitis. No evidence of biliary ductal dilatation.    4. No other significant upper abdominal disease is appreciated elsewhere.    Electronically Signed: Alfred Reyes    4/15/2023 11:07 AM EDT    Workstation ID: [id number]            Impression:     --Acute Klebsiella bacteremia/septicemia, urinary source associated with renal/bladder calculi, ongoing ceftriaxone.  Urology following for further functional/anatomic assessment and further intervention at their discretion.    --Renal/bladder calculi.  Left side hydronephrosis improved and subsequent scan and urology following as above.    --Abnormal imaging with concern for duodenitis and pancreatic cystic lesion.    initial scan had raised some concern for possible abscess but subsequent scan less convincing per radiology and instead description of cystic lesions as above.  If stronger evidence for progressive intra-abdominal infection despite current therapy, you could consider empirically  broader therapy    --Abnormal creatinine.  Baseline unclear and needs further clarification by medicine team.  Monitor to help guide future adjustments and antimicrobials    --Abnormal LFTs.  Chronicity unclear.  Monitor        PLAN:       -- IV ceftriaxone    -- Check/review labs cultures and scans    -- Partial history per nursing staff    -- Discussed with microbiology    -- Discussed with pharmacy    -- Further imaging pending as per surgical team    -- Highly complex set of issues with high risk for further serious morbidity and other serious sequela    **Copied text in this note has been reviewed and is accurate as of 04/16/23.        Justice Mcmillan MD  4/16/2023

## 2023-04-16 NOTE — PROGRESS NOTES
[facility]  PROGRESS NOTE    Patient Name: Abdiaziz Enrique Jr.  : 1935  MRN: [id number]    Date of Admission: 2023  Primary Care Physician: System, Provider Not In    Subjective   Subjective     CC:  Follow up bacteremia    HPI:  Feeling better. Family at bedside with multiple questions, notably regarding BP meds and metformin    ROS:  Gen- No fevers, chills  CV- No chest pain, palpitations  Resp- No cough, dyspnea  GI- No N/V/D, abd pain     Objective   Objective     Vital Signs:   Temp:  [97.8 °F (36.6 °C)-98.6 °F (37 °C)] 97.8 °F (36.6 °C)  Heart Rate:  [54-80] 77  Resp:  [16-19] 19  BP: (133-161)/(56-68) 133/65     Physical Exam:  Constitutional: Elderly male sleeping in bed, easily awoken, NAD  HENT: NCAT, mucous membranes moist  Respiratory: Clear to auscultation bilaterally, respiratory effort normal   Cardiovascular: RRR, palpable radial pulses  Gastrointestinal: Positive bowel sounds, soft, nontender, nondistended  Musculoskeletal: No bilateral ankle edema  Psychiatric: Appropriate affect, cooperative  Neurologic: Speech clear and fluent, hard of hearing    Results Reviewed:  LAB RESULTS:      Lab 04/15/23  0632 23  0523 23  0337 23  0332 23  0324 23  2230 23  1657   WBC 12.40* 13.13* 13.79*  --  13.88*  --  15.26*   HEMOGLOBIN 13.4 12.5* 13.1  --  13.3  --  15.1   HEMATOCRIT 41.1 37.7 39.1  --  38.7  --  43.4   PLATELETS 377 275 223  --  191  --  212   NEUTROS ABS  --   --  11.17*  --   --   --  12.40*   IMMATURE GRANS (ABS)  --   --  0.20*  --   --   --  0.08*   LYMPHS ABS  --   --  0.91  --   --   --  0.90   MONOS ABS  --   --  1.41*  --   --   --  1.82*   EOS ABS  --   --  0.06  --   --   --  0.02   MCV 94.7 95.4 94.0  --  92.1  --  91.2   SED RATE  --   --  85*  --  75*  --   --    CRP  --   --   --  23.79*  --   --  29.63*   PROCALCITONIN  --   --   --   --   --   --  3.61*   LACTATE  --   --   --  1.0  --   2.0  --          Lab 04/16/23  0844 04/15/23  0632 04/14/23  0523 04/13/23  0332 04/12/23  0324 04/11/23  1657   SODIUM 143 143 138 136 133* 130*   POTASSIUM 4.4 4.1 3.7 3.9 3.8 3.7   CHLORIDE 109* 110* 106 104 100 96*   CO2 24.0 24.0 23.0 22.0 20.0* 19.0*   ANION GAP 10.0 9.0 9.0 10.0 13.0 15.0   BUN 26* 28* 39* 46* 53* 59*   CREATININE 1.34* 1.47* 1.66* 1.84* 1.81* 1.99*   EGFR 51.3* 45.9* 39.7* 35.0* 35.7* 31.9*   GLUCOSE 233* 140* 157* 173* 175* 184*   CALCIUM 8.3* 8.2* 8.2* 8.0* 8.1* 8.9   MAGNESIUM  --   --   --   --   --  2.3   HEMOGLOBIN A1C  --   --   --   --   --  6.90*         Lab 04/15/23  0632 04/14/23  0523 04/13/23  0332 04/12/23  0324 04/11/23  1657   TOTAL PROTEIN 5.1* 5.4* 5.6* 5.0* 6.9   ALBUMIN 2.8* 2.7* 2.6* 2.7* 3.3*   GLOBULIN 2.3 2.7 3.0 2.3 3.6   ALT (SGPT) 84* 96* 106* 67* 79*   AST (SGOT) 71* 94* 179* 82* 106*   BILIRUBIN 0.5 0.6 0.7 0.7 0.9   ALK PHOS 231* 229* 223* 165* 198*   LIPASE  --   --   --  24  --          Lab 04/11/23  1657   HSTROP T 23*                 Brief Urine Lab Results  (Last result in the past 365 days)      Color   Clarity   Blood   Leuk Est   Nitrite   Protein   CREAT   Urine HCG        04/11/23 1841 Yellow   Cloudy   Large (3+)   Moderate (2+)   Negative   100 mg/dL (2+)                 Microbiology Results Abnormal     Procedure Component Value - Date/Time    Blood Culture - Blood, Arm, Right [[id number]]  (Normal) Collected: 04/11/23 0222    Lab Status: Preliminary result Specimen: Blood from Arm, Right Updated: 04/15/23 2300     Blood Culture No growth at 4 days          MRI abdomen wo contrast mrcp    Result Date: 4/15/2023  MRI ABDOMEN WO CONTRAST MRCP Date of Exam: 4/14/2023 8:20 PM EDT Indication: pancreatic lesion, elevated LFTs.  Comparison: 4/12/2023 abdomen pelvis CT scan Technique:  Routine multiplanar/multisequence images of the abdomen were obtained with MRCP sequences without contrast administration. Findings: Previous study report noted 3.6 x 2.2 cm  cystic lesion of the pancreatic head favored to be primary cystic lesion such as IPMN. Second and third portion duodenal inflammation. Also improving left obstructive uropathy. Today's multiplanar images show trace left basilar pleural fluid and atelectasis. Multiple gallstones are seen in the otherwise normal-appearing gallbladder. Liver morphology appears normal. No hepatic lesions are appreciated. The spleen is not enlarged.. 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 Patient's pancreatic head lesion is very sharply defined, measures water signal, has a slightly lobular outline and measures 3.1 x 3.4 cm. There are a few very thin internal septations, no evidence of debris or nodule. Although it cannot be evaluated for enhancement on this study, features are generally bland. No additional pancreatic lesions are seen. There is no evidence of any focal or generalized made pancreatic ductal dilatation on the multiplanar images. Bowel loops are normal in caliber. No pathologic marrow signal changes are seen. Incidental note is made of patient's moderate S-shaped thoracolumbar scoliosis. MRCP images show limited detail and most are not diagnostic. The less motion affected images show normal caliber, actually relatively small intra and extra hepatic bile ducts, maximal common duct diameter of between 2 and 2.5 mm. Pancreatic duct is similar in size, upper limits of normal, and there is mild side branch dilatation. By history, patient has current normal serum lipase. There appears to be a mildly aberrant duct in the uncinate process, likely as a variant of normal, measuring up to 4 mm in diameter where best seen, difficult to appreciate on most series. The duodenitis seen on prior study is difficult to appreciate on MR and no  extensive inflammatory change is appreciated.     Impression: Impression: 1. Water signal pancreatic head lesion, smaller uncinate process and pancreatic tail lesions, likely all representing IPMN's. The 2 smaller lesions have simple cyst appearance; the larger lesion is a multilocular cyst with very thin septations and no nodularity, septal thickening or debris. By Fukuoka consensus guidelines it is borderline concerning by size greater than 3 cm, but otherwise no worrisome features. 2. Given the inflammatory changes in the region of the pancreatic head and duodenum on CT, the larger lesion could represent a pancreatic pseudocyst. If patient's symptoms improve/resolve, follow-up imaging in 3 months would be suggested. Otherwise, endoscopic ultrasound could be considered. 3. Cholelithiasis, without evidence of cholecystitis. No evidence of biliary ductal dilatation. 4. No other significant upper abdominal disease is appreciated elsewhere. Electronically Signed: Alfred Reyes  4/15/2023 11:07 AM EDT  Workstation ID: [id number]          Current medications:  Scheduled Meds:amLODIPine, 5 mg, Oral, Q24H  cefTRIAXone, 2 g, Intravenous, Q24H  insulin lispro, 0-7 Units, Subcutaneous, TID AC  pantoprazole, 40 mg, Intravenous, BID AC  senna-docusate sodium, 2 tablet, Oral, BID  sodium chloride, 10 mL, Intravenous, Q12H  tamsulosin, 0.4 mg, Oral, Daily      Continuous Infusions:lactated ringers, 9 mL/hr      PRN Meds:.•  senna-docusate sodium **AND** polyethylene glycol **AND** bisacodyl **AND** bisacodyl  •  dextrose  •  dextrose  •  glucagon (human recombinant)  •  melatonin  •  ondansetron  •  sodium chloride  •  sodium chloride  •  sodium chloride    Assessment & Plan   Assessment & Plan     Active Hospital Problems    Diagnosis  POA   • **Gram-negative bacteremia [R78.81]  Yes   • Generalized weakness [R53.1]  Yes   • Acute UTI [N39.0]  Yes   • Elevated serum creatinine [R79.89]  Yes   • Elevated liver enzymes [R74.8]  Yes   •  Hypertension [I10]  Yes   • Gout [M10.9]  Yes   • BPH (benign prostatic hyperplasia) [N40.0]  Yes   • Left obsructing nephrolithiasis [N20.0]  Yes   • Duodenitis [K29.80]  Yes      Resolved Hospital Problems   No resolved problems to display.        Brief Hospital Course to date:  Abdiaziz Enrique Jr. is a 87 y.o. male w/ BPH, HTN, nephrolithiasis, who was brought for evaluation of generalized weakness and lethargy; UA was concerning for UTI and imaging showed obstructing ureteral stone, large bladder calculus, as well as cystic appearing pancreatic lesion and duodenitis; blood and urine cultures have returned positive for Klebsiella    Assessment/Plan    Klebsiella complex UTI + bacteremia  LT ureterolithiasis w/ hydronephrosis, improved  Large bladder calculus  BPH  -repeat CT imaging shows resolution of obstructing LT ureteral stone  -seen by Uro, no immediate interventions at this time  -cont flomax  -ID follows, cont IV ceftriaxone    Duodenitis  Elevated AST/ALT/ALP  Cystic pancreatic lesion  PUD  -CT abd/pel w/ contrast shows cystic panc head lesion  -MRCP suggestive of IPMNs  -GI has followed, s/p EGD 4/15 w/ multiple duodenal ulcers  -IV ppi bid x1 month then daily    DM type 2, a1c 6.9%, w/o long term use of insulin  -holding metformin  -add SSI    Abnormal renal function, unknown baseline  -Cr slightly improved again this AM    HTN  -losartan and HCTZ on hold w/ unknown baseline renal fxn  -restart home amlodipine    Expected Discharge Location and Transportation: home?  Expected Discharge pending final Abx recs  Expected Discharge Date and Time     Expected Discharge Date Expected Discharge Time    Apr 19, 2023            DVT prophylaxis:  Mechanical DVT prophylaxis orders are present.     AM-PAC 6 Clicks Score (PT): 18 (04/16/23 0800)    CODE STATUS:   Code Status and Medical Interventions:   Ordered at: 04/11/23 4944     Level Of Support Discussed With:    Patient     Code Status (Patient has no  pulse and is not breathing):    CPR (Attempt to Resuscitate)     Medical Interventions (Patient has pulse or is breathing):    Full Support       Geo Paredes, DO  04/16/23

## 2023-04-17 LAB
ALBUMIN SERPL-MCNC: 2.7 G/DL (ref 3.5–5.2)
ALBUMIN/GLOB SERPL: 0.9 G/DL
ALP SERPL-CCNC: 193 U/L (ref 39–117)
ALT SERPL W P-5'-P-CCNC: 57 U/L (ref 1–41)
ANION GAP SERPL CALCULATED.3IONS-SCNC: 9 MMOL/L (ref 5–15)
AST SERPL-CCNC: 65 U/L (ref 1–40)
BILIRUB SERPL-MCNC: 0.5 MG/DL (ref 0–1.2)
BUN SERPL-MCNC: 21 MG/DL (ref 8–23)
BUN/CREAT SERPL: 16.7 (ref 7–25)
CALCIUM SPEC-SCNC: 8.2 MG/DL (ref 8.6–10.5)
CHLORIDE SERPL-SCNC: 109 MMOL/L (ref 98–107)
CO2 SERPL-SCNC: 22 MMOL/L (ref 22–29)
CREAT SERPL-MCNC: 1.26 MG/DL (ref 0.76–1.27)
DEPRECATED RDW RBC AUTO: 50.7 FL (ref 37–54)
EGFRCR SERPLBLD CKD-EPI 2021: 55.2 ML/MIN/1.73
ERYTHROCYTE [DISTWIDTH] IN BLOOD BY AUTOMATED COUNT: 14 % (ref 12.3–15.4)
GLOBULIN UR ELPH-MCNC: 3 GM/DL
GLUCOSE BLDC GLUCOMTR-MCNC: 124 MG/DL (ref 70–130)
GLUCOSE BLDC GLUCOMTR-MCNC: 132 MG/DL (ref 70–130)
GLUCOSE BLDC GLUCOMTR-MCNC: 134 MG/DL (ref 70–130)
GLUCOSE SERPL-MCNC: 147 MG/DL (ref 65–99)
HCT VFR BLD AUTO: 39.6 % (ref 37.5–51)
HGB BLD-MCNC: 12.5 G/DL (ref 13–17.7)
MCH RBC QN AUTO: 30.9 PG (ref 26.6–33)
MCHC RBC AUTO-ENTMCNC: 31.6 G/DL (ref 31.5–35.7)
MCV RBC AUTO: 98 FL (ref 79–97)
PLATELET # BLD AUTO: 449 10*3/MM3 (ref 140–450)
PMV BLD AUTO: 9.7 FL (ref 6–12)
POTASSIUM SERPL-SCNC: 4 MMOL/L (ref 3.5–5.2)
PROT SERPL-MCNC: 5.7 G/DL (ref 6–8.5)
RBC # BLD AUTO: 4.04 10*6/MM3 (ref 4.14–5.8)
SODIUM SERPL-SCNC: 140 MMOL/L (ref 136–145)
WBC NRBC COR # BLD: 12.7 10*3/MM3 (ref 3.4–10.8)

## 2023-04-17 PROCEDURE — 99233 SBSQ HOSP IP/OBS HIGH 50: CPT | Performed by: INTERNAL MEDICINE

## 2023-04-17 PROCEDURE — 82962 GLUCOSE BLOOD TEST: CPT

## 2023-04-17 PROCEDURE — 97530 THERAPEUTIC ACTIVITIES: CPT

## 2023-04-17 PROCEDURE — 85027 COMPLETE CBC AUTOMATED: CPT | Performed by: INTERNAL MEDICINE

## 2023-04-17 PROCEDURE — 80053 COMPREHEN METABOLIC PANEL: CPT | Performed by: INTERNAL MEDICINE

## 2023-04-17 PROCEDURE — 25010000002 CEFTRIAXONE PER 250 MG: Performed by: INTERNAL MEDICINE

## 2023-04-17 RX ADMIN — CEFTRIAXONE 2 G: 2 INJECTION, POWDER, FOR SOLUTION INTRAMUSCULAR; INTRAVENOUS at 09:23

## 2023-04-17 RX ADMIN — TAMSULOSIN HYDROCHLORIDE 0.4 MG: 0.4 CAPSULE ORAL at 09:22

## 2023-04-17 RX ADMIN — Medication 10 ML: at 09:25

## 2023-04-17 RX ADMIN — PANTOPRAZOLE SODIUM 40 MG: 40 INJECTION, POWDER, LYOPHILIZED, FOR SOLUTION INTRAVENOUS at 18:56

## 2023-04-17 RX ADMIN — Medication 10 ML: at 20:38

## 2023-04-17 RX ADMIN — PANTOPRAZOLE SODIUM 40 MG: 40 INJECTION, POWDER, LYOPHILIZED, FOR SOLUTION INTRAVENOUS at 09:23

## 2023-04-17 RX ADMIN — AMLODIPINE BESYLATE 5 MG: 5 TABLET ORAL at 09:23

## 2023-04-17 NOTE — PROGRESS NOTES
[facility] Medicine Services  PROGRESS NOTE    Patient Name: Abdiaziz Enrique Jr.  : 1935  MRN: [id number]    Date of Admission: 2023  Primary Care Physician: System, Provider Not In    Subjective   Subjective     CC:  Follow up bacteremia    HPI:  Feeling well. Had some brief hematuria (painless) earlier today. No further episodes. No abd pain. No n/v/d.    ROS:  Gen- No fevers, chills  CV- No chest pain, palpitations  Resp- No cough, dyspnea  GI- No N/V/D, abd pain     Objective   Objective     Vital Signs:   Temp:  [97.6 °F (36.4 °C)-98.4 °F (36.9 °C)] 97.6 °F (36.4 °C)  Heart Rate:  [54-81] 62  Resp:  [16-19] 19  BP: (132-154)/(56-75) 132/56     Physical Exam:  Constitutional: alert, pleasant, no distress, interactive, nontoxic appearing, comfortably sitting upright, normal work of breathing  HENT: NCAT, mucous membranes moist  Respiratory: ctab  Cardiovascular: RRR, palpable radial pulses  Gastrointestinal: Positive bowel sounds, soft, nontender, nondistended  Musculoskeletal: No bilateral ankle edema  Psychiatric: Appropriate affect, cooperative  Neurologic: face symmetric, speech clear, moves all extremities    Results Reviewed:  LAB RESULTS:      Lab 23  0543 04/15/23  0632 23  0523 23  0337 23  0332 23  0324 23  2230 23  1657   WBC 12.70* 12.40* 13.13* 13.79*  --  13.88*  --  15.26*   HEMOGLOBIN 12.5* 13.4 12.5* 13.1  --  13.3  --  15.1   HEMATOCRIT 39.6 41.1 37.7 39.1  --  38.7  --  43.4   PLATELETS 449 377 275 223  --  191  --  212   NEUTROS ABS  --   --   --  11.17*  --   --   --  12.40*   IMMATURE GRANS (ABS)  --   --   --  0.20*  --   --   --  0.08*   LYMPHS ABS  --   --   --  0.91  --   --   --  0.90   MONOS ABS  --   --   --  1.41*  --   --   --  1.82*   EOS ABS  --   --   --  0.06  --   --   --  0.02   MCV 98.0* 94.7 95.4 94.0  --  92.1  --  91.2   SED RATE  --   --   --  85*  --  75*  --   --    CRP  --   --   --    --  23.79*  --   --  29.63*   PROCALCITONIN  --   --   --   --   --   --   --  3.61*   LACTATE  --   --   --   --  1.0  --  2.0  --          Lab 04/17/23  0543 04/16/23  0844 04/15/23  0632 04/14/23 0523 04/13/23  0332 04/12/23  0324 04/11/23  1657   SODIUM 140 143 143 138 136   < > 130*   POTASSIUM 4.0 4.4 4.1 3.7 3.9   < > 3.7   CHLORIDE 109* 109* 110* 106 104   < > 96*   CO2 22.0 24.0 24.0 23.0 22.0   < > 19.0*   ANION GAP 9.0 10.0 9.0 9.0 10.0   < > 15.0   BUN 21 26* 28* 39* 46*   < > 59*   CREATININE 1.26 1.34* 1.47* 1.66* 1.84*   < > 1.99*   EGFR 55.2* 51.3* 45.9* 39.7* 35.0*   < > 31.9*   GLUCOSE 147* 233* 140* 157* 173*   < > 184*   CALCIUM 8.2* 8.3* 8.2* 8.2* 8.0*   < > 8.9   MAGNESIUM  --   --   --   --   --   --  2.3   HEMOGLOBIN A1C  --   --   --   --   --   --  6.90*    < > = values in this interval not displayed.         Lab 04/17/23  0543 04/15/23  0632 04/14/23  0523 04/13/23  0332 04/12/23  0324   TOTAL PROTEIN 5.7* 5.1* 5.4* 5.6* 5.0*   ALBUMIN 2.7* 2.8* 2.7* 2.6* 2.7*   GLOBULIN 3.0 2.3 2.7 3.0 2.3   ALT (SGPT) 57* 84* 96* 106* 67*   AST (SGOT) 65* 71* 94* 179* 82*   BILIRUBIN 0.5 0.5 0.6 0.7 0.7   ALK PHOS 193* 231* 229* 223* 165*   LIPASE  --   --   --   --  24         Lab 04/11/23  1657   HSTROP T 23*                 Brief Urine Lab Results  (Last result in the past 365 days)      Color   Clarity   Blood   Leuk Est   Nitrite   Protein   CREAT   Urine HCG        04/11/23 1841 Yellow   Cloudy   Large (3+)   Moderate (2+)   Negative   100 mg/dL (2+)                 Microbiology Results Abnormal     Procedure Component Value - Date/Time    Blood Culture - Blood, Arm, Right [[id number]]  (Normal) Collected: 04/11/23 0222    Lab Status: Final result Specimen: Blood from Arm, Right Updated: 04/16/23 2300     Blood Culture No growth at 5 days          No radiology results from the last 24 hrs        Current medications:  Scheduled Meds:amLODIPine, 5 mg, Oral, Q24H  cefTRIAXone, 2 g, Intravenous,  Q24H  insulin lispro, 0-7 Units, Subcutaneous, TID AC  pantoprazole, 40 mg, Intravenous, BID AC  senna-docusate sodium, 2 tablet, Oral, BID  sodium chloride, 10 mL, Intravenous, Q12H  tamsulosin, 0.4 mg, Oral, Daily      Continuous Infusions:lactated ringers, 9 mL/hr      PRN Meds:.•  senna-docusate sodium **AND** polyethylene glycol **AND** bisacodyl **AND** bisacodyl  •  dextrose  •  dextrose  •  glucagon (human recombinant)  •  melatonin  •  ondansetron  •  sodium chloride  •  sodium chloride  •  sodium chloride    Assessment & Plan   Assessment & Plan     Active Hospital Problems    Diagnosis  POA   • **Gram-negative bacteremia [R78.81]  Yes   • Generalized weakness [R53.1]  Yes   • Acute UTI [N39.0]  Yes   • Elevated serum creatinine [R79.89]  Yes   • Elevated liver enzymes [R74.8]  Yes   • Hypertension [I10]  Yes   • Gout [M10.9]  Yes   • BPH (benign prostatic hyperplasia) [N40.0]  Yes   • Left obsructing nephrolithiasis [N20.0]  Yes   • Duodenitis [K29.80]  Yes      Resolved Hospital Problems   No resolved problems to display.        Brief Hospital Course to date:  Abdiaziz Enrique Jr. is a 87 y.o. male w/ BPH, HTN, nephrolithiasis, who was brought for evaluation of generalized weakness and lethargy; UA was concerning for UTI and imaging showed obstructing ureteral stone, large bladder calculus, as well as cystic appearing pancreatic lesion and duodenitis; blood and urine cultures have returned positive for Klebsiella    Assessment/Plan    Klebsiella complex UTI + bacteremia  LT ureterolithiasis w/ mild hydronephrosis, improved  Large bladder calculus  BPH  -repeat CT imaging shows resolution of obstructing LT ureteral stone and large bladder stone  -seen by Uro Dr. Sony Schofield on 4/12/23: feels like patient passed the left ureteral stone into the bladder. Renal function has imroved/normalized and currently afebrile and pain free. I discussed w/ Dr. Schofield on 4/17/23, he is not planning on any further  intervention currently and wishes to see patient in 4-6 weeks  -cont flomax  -ID follows, cont IV ceftriaxone per Dr. Mcmillan. Per daughter, patient lives about 30 min away and would be willing/able to make daily ID clinic appointments for infusions. Will d/w Dr. 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 H.pylori path pending. GI recommends bid ppi x 1 month, then daily. GI will communicate h.pylori biopsy results to patient if positive. Due to age does not recommend future surveillance of IPMN's (of note, very mild recurrent focal interstitial pancreatitis is common in associate w/ IPMN's, and may often be seen on imagign/labs without significant symptoms).    DAE, improved  -initial creatining 1.99; improved to 1.26 on 4/17/23    DM type 2, a1c 6.9%, w/o long term use of insulin  -holding metformin  -add SSI    HTN  -restarted home amlodipine; losartan and HCTZ on hold due to dae      Expected Discharge Location and Transportation: home   Expected Discharge 4/18/23 (pending final abx plans per Dr. Mcmillan)  Follow up: Dr. Sony Schofield (urology) 4-6 weeks; Dr. Mcmillan (ID) per his recs      DVT prophylaxis:  Mechanical DVT prophylaxis orders are present.     AM-PAC 6 Clicks Score (PT): 20 (04/17/23 4338)    CODE STATUS:   Code Status and Medical Interventions:   Ordered at: 04/11/23 3590     Level Of Support Discussed With:    Patient     Code Status (Patient has no pulse and is not breathing):    CPR (Attempt to Resuscitate)     Medical Interventions (Patient has pulse or is breathing):    Full Support       Miguel Barnard MD  04/17/23

## 2023-04-17 NOTE — PLAN OF CARE
Goal Outcome Evaluation:  Plan of Care Reviewed With: patient, daughter        Progress: improving  Outcome Evaluation: Physical therapy treatment complete. The patient increased ambulation distance compared to previous treatment session. Patient would continue to benefit from skilled PT to address strength, balance and activity tolerance deficits. Continue current PT POC.

## 2023-04-17 NOTE — THERAPY TREATMENT NOTE
Patient Name: Abdiaziz Enrique Jr.  : 1935    MRN: [id number]                              Today's Date: 2023       Admit Date: 2023    Visit Dx:     ICD-10-CM ICD-9-CM   1. Generalized weakness  R53.1 780.79   2. Acute kidney injury  N17.9 584.9   3. Hyponatremia  E87.1 276.1   4. Confusion  R41.0 298.9   5. Acute UTI  N39.0 599.0   6. Elevated liver enzymes  R74.8 790.5   7. Duodenitis  K29.80 535.60     Patient Active Problem List   Diagnosis   • Generalized weakness   • Acute UTI   • Elevated serum creatinine   • Elevated liver enzymes   • Hypertension   • Gout   • BPH (benign prostatic hyperplasia)   • Left obsructing nephrolithiasis   • Gram-negative bacteremia   • Duodenitis     Past Medical History:   Diagnosis Date   • Bladder stone    • BPH (benign prostatic hyperplasia) 2023   • Gout 2023   • Hypertension 2023     Past Surgical History:   Procedure Laterality Date   • CYSTOSCOPY     • CYSTOSCOPY W/ URETERAL STENT PLACEMENT     • TONSILLECTOMY        General Information     Row Name 23 1602          Physical Therapy Time and Intention    Document Type therapy note (daily note)  -ML     Mode of Treatment physical therapy  -ML     Row Name 23 1602          General Information    Patient Profile Reviewed yes  -ML     Existing Precautions/Restrictions fall  -ML     Row Name 23 1602          Cognition    Orientation Status (Cognition) oriented x 3  -ML     Row Name 23 1602          Safety Issues, Functional Mobility    Safety Issues Affecting Function (Mobility) awareness of need for assistance;insight into deficits/self-awareness;safety precaution awareness;safety precautions follow-through/compliance  -ML     Impairments Affecting Function (Mobility) balance;coordination;endurance/activity tolerance;strength  -ML           User Key  (r) = Recorded By, (t) = Taken By, (c) = Cosigned By    Initials Name Provider Type    ML Dee Duque  Therapist               Mobility     Row Name 04/17/23 1602          Bed Mobility    Bed Mobility supine-sit  -ML     Supine-Sit [place] (Bed Mobility) verbal cues;nonverbal cues (demo/gesture);minimum assist (75% patient effort);1 person assist  -ML     Assistive Device (Bed Mobility) bed rails;head of bed elevated  -ML     Row Name 04/17/23 1602          Sit-Stand Transfer    Sit-Stand [place] (Transfers) supervision;verbal cues  -ML     Assistive Device (Sit-Stand Transfers) walker, front-wheeled  -ML     Row Name 04/17/23 1602          Gait/Stairs (Locomotion)    [place] Level (Gait) supervision  -ML     Assistive Device (Gait) walker, front-wheeled  -ML     Distance in Feet (Gait) 600  -ML     Deviations/Abnormal Patterns (Gait) gait speed decreased  -ML     Bilateral Gait Deviations forward flexed posture  -ML           User Key  (r) = Recorded By, (t) = Taken By, (c) = Cosigned By    Initials Name Provider Type    ML Dee Duque Physical Therapist               Obj/Interventions     Row Name 04/17/23 1603          Balance    Balance Assessment sitting static balance;sitting dynamic balance;sit to stand dynamic balance;standing static balance;standing dynamic balance  -ML     Static Sitting Balance standby assist  -ML     Dynamic Sitting Balance supervision  -ML     Position, Sitting Balance unsupported;sitting edge of bed  -ML     Sit to Stand Dynamic Balance supervision;verbal cues  -ML     Static Standing Balance supervision  -ML     Dynamic Standing Balance contact guard  -ML     Position/Device Used, Standing Balance supported;walker, rolling  -ML     Balance Interventions sitting;standing;sit to stand;supported;static;dynamic;occupation based/functional task  -ML           User Key  (r) = Recorded By, (t) = Taken By, (c) = Cosigned By    Initials Name Provider Type     Dee Duque Physical Therapist               Goals/Plan    No documentation.                Clinical Impression      Row Name 04/17/23 1604          Pain    Pretreatment Pain Rating 0/10 - no pain  -ML     Posttreatment Pain Rating 0/10 - no pain  -ML     Row Name 04/17/23 1604          Plan of Care Review    Plan of Care Reviewed With patient;daughter  -ML     Progress improving  -ML     Outcome Evaluation Physical therapy treatment complete. The patient increased ambulation distance compared to previous treatment session. Patinet would continue to benefit from skilled PT to address strength, balance and activity tolerance deficits. Continue current PT POC.  -ML     Row Name 04/17/23 1604          Vital Signs    Pre Patient Position Supine  -ML     Intra Patient Position Standing  -ML     Post Patient Position Sitting  -ML     Row Name 04/17/23 1604          Positioning and Restraints    Pre-Treatment Position in bed  -ML     Post Treatment Position chair  -ML     In Chair notified nsg;reclined;call light within reach;encouraged to call for assist;exit alarm on;with family/caregiver;waffle cushion;legs elevated  -ML           User Key  (r) = Recorded By, (t) = Taken By, (c) = Cosigned By    Initials Name Provider Type    ML Dee Duque Physical Therapist               Outcome Measures     Row Name 04/17/23 1606 04/17/23 0800       How much help from another person do you currently need...    Turning from your back to your side while in flat bed without using bedrails? 4  -ML 3  -TY (r) IK (t) TY (c)    Moving from lying on back to sitting on the side of a flat bed without bedrails? 3  -ML 3  -TY (r) IK (t) TY (c)    Moving to and from a bed to a chair (including a wheelchair)? 3  -ML 3  -TY (r) IK (t) TY (c)    Standing up from a chair using your arms (e.g., wheelchair, bedside chair)? 4  -ML 3  -TY (r) IK (t) TY (c)    Climbing 3-5 steps with a railing? 3  -ML 3  -TY (r) IK (t) TY (c)    To walk in hospital room? 3  -ML 3  -TY (r) IK (t) TY (c)    AM-PAC 6 Clicks Score (PT) 20  -ML 18  -TY (r) IK (t)    Highest level of  mobility 6 --> Walked 10 steps or more  -ML 6 --> Walked 10 steps or more  -TY (r) IK (t)    Row Name 04/17/23 1606          Functional Assessment    Outcome Measure Options AM-PAC 6 Clicks Basic Mobility (PT)  -ML           User Key  (r) = Recorded By, (t) = Taken By, (c) = Cosigned By    Initials Name Provider Type     Dee Duque Physical Therapist    Beverley Swenson, Nursing Student Nursing Student    Madisyn Hendrix RN Registered Nurse                             Physical Therapy Education     Title: PT OT SLP Therapies (In Progress)     Topic: Physical Therapy (In Progress)     Point: Mobility training (Done)     Learning Progress Summary           Patient Acceptance, E, VU,NR by ML at 4/17/2023 1606    Acceptance, E, NR by BA at 4/15/2023 1643    Acceptance, E,D, VU,NR by LR at 4/12/2023 1438    Comment: Educated on benefits of mobility and being OOB. Educated on safety with mobility, correct supine to sit t/f technique, correct sit<->stand t/f technique, correct gait mechanics, and progression of POC.   Family Acceptance, E, VU,NR by ML at 4/17/2023 1606                   Point: Home exercise program (Done)     Learning Progress Summary           Patient Acceptance, E,D, VU,NR by LR at 4/12/2023 1438    Comment: Educated on benefits of mobility and being OOB. Educated on safety with mobility, correct supine to sit t/f technique, correct sit<->stand t/f technique, correct gait mechanics, and progression of POC.                   Point: Body mechanics (In Progress)     Learning Progress Summary           Patient Acceptance, E, NR by BA at 4/15/2023 1643    Acceptance, E,D, VU,NR by LR at 4/12/2023 1438    Comment: Educated on benefits of mobility and being OOB. Educated on safety with mobility, correct supine to sit t/f technique, correct sit<->stand t/f technique, correct gait mechanics, and progression of POC.                   Point: Precautions (Done)     Learning Progress Summary           Patient  Acceptance, E, VU,NR by  at 4/17/2023 1606    Acceptance, E, NR by BA at 4/15/2023 1643    Acceptance, E,D, VU,NR by  at 4/12/2023 1438    Comment: Educated on benefits of mobility and being OOB. Educated on safety with mobility, correct supine to sit t/f technique, correct sit<->stand t/f technique, correct gait mechanics, and progression of POC.   Family Acceptance, E, VU,NR by  at 4/17/2023 1606                               User Key     Initials Effective Dates Name Provider Type Discipline    LR 02/03/23 -  Melissa Tang, PT Physical Therapist PT     04/22/21 -  Dee Duque Physical Therapist PT    BA 09/21/21 -  Wandy George, PT Physical Therapist PT              PT Recommendation and Plan     Plan of Care Reviewed With: patient, daughter  Progress: improving  Outcome Evaluation: Physical therapy treatment complete. The patient increased ambulation distance compared to previous treatment session. Pat[place] would continue to benefit from skilled PT to address strength, balance and activity tolerance deficits. Continue current PT POC.     Time Calculation:    PT Charges     Row Name 04/17/23 1606             Time Calculation    Start Time 1540  -ML      PT Received On 04/17/23  -ML         Timed Charges    06734 - PT Therapeutic Activity Minutes 20  -ML         Total Minutes    Timed Charges Total Minutes 20  -ML       Total Minutes 20  -ML            User Key  (r) = Recorded By, (t) = Taken By, (c) = Cosigned By    Initials Name Provider Type     Dee Duque Physical Therapist              Therapy Charges for Today     Code Description Service Date Service Provider Modifiers Qty    [id number]  PT THERAPEUTIC ACT EA 15 MIN 4/17/2023 Dee Duque GP 1          PT G-Codes  Outcome Measure Options: AM-PAC 6 Clicks Basic Mobility (PT)  AM-PAC 6 Clicks Score (PT): 20  AM-PAC 6 Clicks Score (OT): 21       Dee Duque  4/17/2023

## 2023-04-17 NOTE — PROGRESS NOTES
"Abdiaziz Enrique Jr.  1935  [id number]      Chief Complaint: fatigue    Reason for Consultation: bactermic urinary infection    History of present illness:     Patient is a 87 y.o.  Yr old male not a great historian with respect to past medical care, daughter is present to give information, with history of renal calculi/bladder calculi and prior care at the [facility], has had cystoscopy previously and a prior stent although the specifics of that are not clear.  Daughter had been out of town, reports returning to town and finding her dad confused, lethargic and poor p.o. intake with dysuria; diagnosed with acute UTI and abnormal CT scan with concern for obstructing stone at the left ureter/bilateral nephrolithiasis in addition to severe wall edema involving the duodenum and possibility for large inflamed duodenal diverticulum and possible adjacent abscess on April 11.  Repeat CT scan April 12 with obstructing calculus in the mid left ureter resolved, mild left hydronephrosis/moderate left proximal ureteral dilation improved per radiology with large calculus in the bladder.  Duodenitis noted with cystic lesion in the pancreatic head but no other description of abscess.  Surgery has seen him.    4/15/23 had EGD and abd MRI;  \"EGD shows multiple ulcers in the duodenal bulb and second portion duodenum\" per GI    4/17/23 seen early and sleepy ;    Denies any current abdominal pain.  Appetite diminished but denies overt vomiting/diarrhea and no hematochezia melena or hematemesis.   previous urinary urgency/frequency but denies having to strain and no current dysuria.  No hematuria or pyuria.  Currently no flank pain    No headache photophobia or neck stiffness.  No shortness of breath cough or hemoptysis.  No skin rash      Past Medical History:   Diagnosis Date   • Bladder stone    • BPH (benign prostatic hyperplasia) 04/11/2023   • Gout 04/11/2023   • Hypertension 04/11/2023       Past Surgical History: " "  Procedure Laterality Date   • CYSTOSCOPY     • CYSTOSCOPY W/ URETERAL STENT PLACEMENT     • TONSILLECTOMY         Pediatric History   Patient Parents   • Not on file     Other Topics Concern   • Not on file   Social History Narrative   • Not on file       family history includes Alcohol abuse in his father; COPD in his mother; Cerebral aneurysm in his father; Dementia in his mother.    No Known Allergies     Review of Systems    4/17/23  Per nursing also    Constitutional--fever better with no chills or sweats.  Appetite improving with fatigue.  Heent-- No new vision, hearing or throat complaints.  No epistaxis or oral sores.  Denies odynophagia or dysphagia.  No flashers, floaters or eye pain. No odynophagia or dysphagia. No headache, photophobia or neck stiffness.  CV-- No chest pain, palpitation or syncope  Resp-- No SOB/cough/Hemoptysis  GI-see above.   No nausea, vomiting, or diarrhea.  No hematochezia, melena, or hematemesis. Denies jaundice or chronic liver disease.  --see above  Lymph- no swollen lymph nodes in neck/axilla or groin.   Heme- No active bruising or bleeding; no Hx of DVT or PE.  MS-- no swelling or pain in the bones or joints of arms/legs.  No new back pain.  Neuro-- No acute focal weakness or numbness in the arms or legs.  No seizures.    Full 12 point review of systems reviewed and negative otherwise for acute complaints, except for above    Physical Exam:   Vital Signs   /75 (BP Location: Right arm, Patient Position: Lying)   Pulse 57   Temp 98.1 °F (36.7 °C) (Oral)   Resp 18   Ht 172.7 cm (68\")   Wt 81.6 kg (180 lb)   SpO2 95%   BMI 27.37 kg/m²     GENERAL: sleepy, in no acute distress.  Chronically ill-appearing  HEENT: Normocephalic, atraumatic. No conjunctival injection. No icterus. Oropharynx clear without evidence of thrush or exudate. No evidence of peridontal disease.    NECK: Supple without nuchal rigidity. No mass.  HEART: RRR; No murmur, rubs, gallops.   LUNGS: " Clear to auscultation bilaterally without wheezing, rales, rhonchi. Normal respiratory effort. Nonlabored. No dullness.  ABDOMEN: Soft, nontender, nondistended. Positive bowel sounds. No rebound or guarding. NO mass or HSM.  EXT:  No cyanosis, clubbing or edema. No cord.  MSK: FROM without joint effusions noted arms/legs.    SKIN: Warm and dry without cutaneous eruptions on Inspection/palpation.    NEURO: sleepy    No peripheral stigmata/phenomena of endocarditis    IV without obvious redness or drainage    Laboratory Data    Results from last 7 days   Lab Units 04/17/23  0543 04/15/23  0632 04/14/23  0523   WBC 10*3/mm3 12.70* 12.40* 13.13*   HEMOGLOBIN g/dL 12.5* 13.4 12.5*   HEMATOCRIT % 39.6 41.1 37.7   PLATELETS 10*3/mm3 449 377 275     Results from last 7 days   Lab Units 04/17/23  0543   SODIUM mmol/L 140   POTASSIUM mmol/L 4.0   CHLORIDE mmol/L 109*   CO2 mmol/L 22.0   BUN mg/dL 21   CREATININE mg/dL 1.26   GLUCOSE mg/dL 147*   CALCIUM mg/dL 8.2*     Results from last 7 days   Lab Units 04/17/23  0543   ALK PHOS U/L 193*   BILIRUBIN mg/dL 0.5   ALT (SGPT) U/L 57*   AST (SGOT) U/L 65*     Results from last 7 days   Lab Units 04/13/23  0337   SED RATE mm/hr 85*     Results from last 7 days   Lab Units 04/13/23  0332   CRP mg/dL 23.79*       Estimated Creatinine Clearance: 47.7 mL/min (by C-G formula based on SCr of 1.26 mg/dL).      Microbiology:      Radiology:  Imaging Results (Last 72 Hours)     Procedure Component Value Units Date/Time    MRI abdomen wo contrast mrcp [[id number]] Collected: 04/15/23 1042     Updated: 04/15/23 1110    Narrative:      MRI ABDOMEN WO CONTRAST MRCP    Date of Exam: 4/14/2023 8:20 PM EDT    Indication: pancreatic lesion, elevated LFTs.     Comparison: 4/12/2023 abdomen pelvis CT scan    Technique:  Routine multiplanar/multisequence images of the abdomen were obtained with MRCP sequences without contrast administration.    Findings:   Previous study report noted 3.6 x 2.2 cm  cystic lesion of the pancreatic head favored to be primary cystic lesion such as IPMN. Second and third portion duodenal inflammation. Also improving left obstructive uropathy.    Today's multiplanar images show trace left basilar pleural fluid and atelectasis. Multiple gallstones are seen in the otherwise normal-appearing gallbladder. Liver morphology appears normal. No hepatic lesions are appreciated. The spleen is not   enlarged..    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 Patient's   pancreatic head lesion is very sharply defined, measures water signal, has a slightly lobular outline and measures 3.1 x 3.4 cm. There are a few very thin internal septations, no evidence of debris or nodule. Although it cannot be evaluated for   enhancement on this study, features are generally bland.     No additional pancreatic lesions are seen. There is no evidence of any focal or generalized made pancreatic ductal dilatation on the multiplanar images. Bowel loops are normal in caliber. No pathologic marrow signal changes are seen. Incidental note is   made of patient's moderate S-shaped thoracolumbar scoliosis.    MRCP images show limited detail and most are not diagnostic. The less motion affected images show normal caliber, actually relatively small intra and extra hepatic bile ducts, maximal common duct diameter of between 2 and 2.5 mm. Pancreatic duct is   similar in size, upper limits of normal, and there is mild side branch dilatation. By history, patient has current normal serum lipase.    There appears to be a mildly aberrant duct in the uncinate process, likely as a variant of normal, measuring up to 4 mm in diameter where best seen, difficult to appreciate on most series. The duodenitis seen on prior study is difficult to  appreciate on   MR and no extensive inflammatory change is appreciated.          Impression:      Impression:     1. Water signal pancreatic head lesion, smaller uncinate process and pancreatic tail lesions, likely all representing IPMN's. The 2 smaller lesions have simple cyst appearance; the larger lesion is a multilocular cyst with very thin septations and no   nodularity, septal thickening or debris. By Fukuoka consensus guidelines it is borderline concerning by size greater than 3 cm, but otherwise no worrisome features.    2. Given the inflammatory changes in the region of the pancreatic head and duodenum on CT, the larger lesion could represent a pancreatic pseudocyst. If patient's symptoms improve/resolve, follow-up imaging in 3 months would be suggested. Otherwise,   endoscopic ultrasound could be considered.     3. Cholelithiasis, without evidence of cholecystitis. No evidence of biliary ductal dilatation.    4. No other significant upper abdominal disease is appreciated elsewhere.    Electronically Signed: Alfred Reyes    4/15/2023 11:07 AM EDT    Workstation ID: [id number]            Impression:     --Acute Klebsiella bacteremia/septicemia, urinary source associated with renal/bladder calculi, ongoing ceftriaxone.  Urology following for further functional/anatomic assessment and further intervention at their discretion.    --Renal/bladder calculi.  Left side hydronephrosis improved and subsequent scan and urology following as above.    --Abnormal imaging with concern for duodenitis and pancreatic cystic lesion.    initial scan had raised some concern for possible abscess but subsequent scan less convincing per radiology and instead description of cystic lesions as above.  If stronger evidence for progressive intra-abdominal infection despite current therapy, you could consider empirically broader therapy    --Abnormal creatinine.  Baseline unclear and needs further clarification by medicine team.  Monitor to  help guide future adjustments and antimicrobials    --Abnormal LFTs.  Chronicity unclear.  Monitor        PLAN:       -- IV ceftriaxone    -- Check/review labs cultures and scans    -- Partial history per nursing staff    -- Discussed with microbiology    -- Discussed with pharmacy    -- Further imaging pending as per surgical team    -- Highly complex set of issues with high risk for further serious morbidity and other serious sequela    **Copied text in this note has been reviewed and is accurate as of 04/17/23.        Justice Mcmillan MD  4/17/2023

## 2023-04-17 NOTE — PLAN OF CARE
Goal Outcome Evaluation:  Plan of Care Reviewed With: patient           Outcome Evaluation: VSS. RA. Pt slept well throughout the night. UOP adequate. x1BM.

## 2023-04-17 NOTE — CASE MANAGEMENT/SOCIAL WORK
Continued Stay Note  [facility]     Patient Name: Abdiaziz Enrique Jr.  MRN: [id number]  Today's Date: 4/17/2023    Admit Date: 4/11/2023    Plan: update   Discharge Plan     Row Name 04/17/23 1219       Plan    Plan update    Patient/Family in Agreement with Plan yes    Plan Comments Spoke with patient at bedside regarding discharge planning.  Patient unsure of when he will be discharged.  Up to chair, would like to get up and walk the kapoor.  No new discharge needs verbalized.  CM following.  Patient plan is to dischsarge home via car with family to transport.    Final Discharge Disposition Code 01 - home or self-care               Discharge Codes    No documentation.               Expected Discharge Date and Time     Expected Discharge Date Expected Discharge Time    Apr 19, 2023             Mia Phan RN

## 2023-04-18 LAB
CYTO UR: NORMAL
GLUCOSE BLDC GLUCOMTR-MCNC: 118 MG/DL (ref 70–130)
GLUCOSE BLDC GLUCOMTR-MCNC: 139 MG/DL (ref 70–130)
GLUCOSE BLDC GLUCOMTR-MCNC: 185 MG/DL (ref 70–130)
LAB AP CASE REPORT: NORMAL
LAB AP CLINICAL INFORMATION: NORMAL
PATH REPORT.FINAL DX SPEC: NORMAL
PATH REPORT.GROSS SPEC: NORMAL

## 2023-04-18 PROCEDURE — 25010000002 CEFTRIAXONE PER 250 MG: Performed by: INTERNAL MEDICINE

## 2023-04-18 PROCEDURE — 63710000001 INSULIN LISPRO (HUMAN) PER 5 UNITS: Performed by: INTERNAL MEDICINE

## 2023-04-18 PROCEDURE — 82962 GLUCOSE BLOOD TEST: CPT

## 2023-04-18 PROCEDURE — 99239 HOSP IP/OBS DSCHRG MGMT >30: CPT | Performed by: INTERNAL MEDICINE

## 2023-04-18 RX ORDER — PANTOPRAZOLE SODIUM 40 MG/1
40 TABLET, DELAYED RELEASE ORAL 2 TIMES DAILY
Qty: 60 TABLET | Refills: 0 | Status: SHIPPED | OUTPATIENT
Start: 2023-04-18 | End: 2023-05-18

## 2023-04-18 RX ADMIN — PANTOPRAZOLE SODIUM 40 MG: 40 INJECTION, POWDER, LYOPHILIZED, FOR SOLUTION INTRAVENOUS at 10:40

## 2023-04-18 RX ADMIN — AMLODIPINE BESYLATE 5 MG: 5 TABLET ORAL at 10:40

## 2023-04-18 RX ADMIN — Medication 10 ML: at 10:40

## 2023-04-18 RX ADMIN — PANTOPRAZOLE SODIUM 40 MG: 40 INJECTION, POWDER, LYOPHILIZED, FOR SOLUTION INTRAVENOUS at 18:17

## 2023-04-18 RX ADMIN — INSULIN LISPRO 2 UNITS: 100 INJECTION, SOLUTION INTRAVENOUS; SUBCUTANEOUS at 12:35

## 2023-04-18 RX ADMIN — TAMSULOSIN HYDROCHLORIDE 0.4 MG: 0.4 CAPSULE ORAL at 10:40

## 2023-04-18 RX ADMIN — Medication 10 ML: at 20:19

## 2023-04-18 RX ADMIN — CEFTRIAXONE 2 G: 2 INJECTION, POWDER, FOR SOLUTION INTRAMUSCULAR; INTRAVENOUS at 11:30

## 2023-04-18 NOTE — PROGRESS NOTES
"Abdiaziz Enrique .  1935  [id number]      Chief Complaint: fatigue    Reason for Consultation: bactermic urinary infection    History of present illness:     Patient is a 87 y.o.  Yr old male not a great historian with respect to past medical care, daughter is present to give information, with history of renal calculi/bladder calculi and prior care at the [facility], has had cystoscopy previously and a prior stent although the specifics of that are not clear.  Daughter had been out of town, reports returning to town and finding her dad confused, lethargic and poor p.o. intake with dysuria; diagnosed with acute UTI and abnormal CT scan with concern for obstructing stone at the left ureter/bilateral nephrolithiasis in addition to severe wall edema involving the duodenum and possibility for large inflamed duodenal diverticulum and possible adjacent abscess on April 11.  Repeat CT scan April 12 with obstructing calculus in the mid left ureter resolved, mild left hydronephrosis/moderate left proximal ureteral dilation improved per radiology with large calculus in the bladder.  Duodenitis noted with cystic lesion in the pancreatic head but no other description of abscess.  Surgery has seen him.    4/15/23 had EGD and abd MRI;  \"EGD shows multiple ulcers in the duodenal bulb and second portion duodenum\" per GI    4/18/23 up in bedside chair, hoping to get home ;    Denies any current abdominal pain.  Appetite diminished but denies overt vomiting/diarrhea and no hematochezia melena or hematemesis.   previous urinary urgency/frequency but denies having to strain and no current dysuria.  No hematuria or pyuria.  Currently no flank pain    No headache photophobia or neck stiffness.  No shortness of breath cough or hemoptysis.  No skin rash      Past Medical History:   Diagnosis Date   • Bladder stone    • BPH (benign prostatic hyperplasia) 04/11/2023   • Gout 04/11/2023   • Hypertension 04/11/2023       Past " "Surgical History:   Procedure Laterality Date   • CYSTOSCOPY     • CYSTOSCOPY W/ URETERAL STENT PLACEMENT     • TONSILLECTOMY         Pediatric History   Patient Parents   • Not on file     Other Topics Concern   • Not on file   Social History Narrative   • Not on file       family history includes Alcohol abuse in his father; COPD in his mother; Cerebral aneurysm in his father; Dementia in his mother.    No Known Allergies     Review of Systems    4/18/23  Per nursing also    Constitutional--fever better with no chills or sweats.  Appetite improving with fatigue.  Heent-- No new vision, hearing or throat complaints.  No epistaxis or oral sores.  Denies odynophagia or dysphagia.  No flashers, floaters or eye pain. No odynophagia or dysphagia. No headache, photophobia or neck stiffness.  CV-- No chest pain, palpitation or syncope  Resp-- No SOB/cough/Hemoptysis  GI-see above.   No nausea, vomiting, or diarrhea.  No hematochezia, melena, or hematemesis. Denies jaundice or chronic liver disease.  --see above  Lymph- no swollen lymph nodes in neck/axilla or groin.   Heme- No active bruising or bleeding; no Hx of DVT or PE.  MS-- no swelling or pain in the bones or joints of arms/legs.  No new back pain.  Neuro-- No acute focal weakness or numbness in the arms or legs.  No seizures.    Full 12 point review of systems reviewed and negative otherwise for acute complaints, except for above    Physical Exam:   Vital Signs   /71 (BP Location: Left arm, Patient Position: Lying)   Pulse 74   Temp 98.3 °F (36.8 °C) (Oral)   Resp 19   Ht 172.7 cm (68\")   Wt 81.6 kg (180 lb)   SpO2 98%   BMI 27.37 kg/m²     GENERAL: sleepy, in no acute distress.  Chronically ill-appearing  HEENT: Normocephalic, atraumatic. No conjunctival injection. No icterus. Oropharynx clear without evidence of thrush or exudate. No evidence of peridontal disease.    NECK: Supple without nuchal rigidity. No mass.  HEART: RRR; No murmur, rubs, " gallops.   LUNGS: Clear to auscultation bilaterally without wheezing, rales, rhonchi. Normal respiratory effort. Nonlabored. No dullness.  ABDOMEN: Soft, nontender, nondistended. Positive bowel sounds. No rebound or guarding. NO mass or HSM.  EXT:  No cyanosis, clubbing or edema. No cord.  MSK: FROM without joint effusions noted arms/legs.    SKIN: Warm and dry without cutaneous eruptions on Inspection/palpation.    NEURO: sleepy    No peripheral stigmata/phenomena of endocarditis    IV without obvious redness or drainage    Laboratory Data    Results from last 7 days   Lab Units 04/17/23  0543 04/15/23  0632 04/14/23  0523   WBC 10*3/mm3 12.70* 12.40* 13.13*   HEMOGLOBIN g/dL 12.5* 13.4 12.5*   HEMATOCRIT % 39.6 41.1 37.7   PLATELETS 10*3/mm3 449 377 275     Results from last 7 days   Lab Units 04/17/23  0543   SODIUM mmol/L 140   POTASSIUM mmol/L 4.0   CHLORIDE mmol/L 109*   CO2 mmol/L 22.0   BUN mg/dL 21   CREATININE mg/dL 1.26   GLUCOSE mg/dL 147*   CALCIUM mg/dL 8.2*     Results from last 7 days   Lab Units 04/17/23  0543   ALK PHOS U/L 193*   BILIRUBIN mg/dL 0.5   ALT (SGPT) U/L 57*   AST (SGOT) U/L 65*     Results from last 7 days   Lab Units 04/13/23  0337   SED RATE mm/hr 85*     Results from last 7 days   Lab Units 04/13/23  0332   CRP mg/dL 23.79*       Estimated Creatinine Clearance: 47.7 mL/min (by C-G formula based on SCr of 1.26 mg/dL).      Microbiology:      Radiology:  Imaging Results (Last 72 Hours)     Procedure Component Value Units Date/Time    MRI abdomen wo contrast mrcp [[id number]] Collected: 04/15/23 1042     Updated: 04/15/23 1110    Narrative:      MRI ABDOMEN WO CONTRAST MRCP    Date of Exam: 4/14/2023 8:20 PM EDT    Indication: pancreatic lesion, elevated LFTs.     Comparison: 4/12/2023 abdomen pelvis CT scan    Technique:  Routine multiplanar/multisequence images of the abdomen were obtained with MRCP sequences without contrast administration.    Findings:   Previous study report noted  3.6 x 2.2 cm cystic lesion of the pancreatic head favored to be primary cystic lesion such as IPMN. Second and third portion duodenal inflammation. Also improving left obstructive uropathy.    Today's multiplanar images show trace left basilar pleural fluid and atelectasis. Multiple gallstones are seen in the otherwise normal-appearing gallbladder. Liver morphology appears normal. No hepatic lesions are appreciated. The spleen is not   enlarged..    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 Patient's   pancreatic head lesion is very sharply defined, measures water signal, has a slightly lobular outline and measures 3.1 x 3.4 cm. There are a few very thin internal septations, no evidence of debris or nodule. Although it cannot be evaluated for   enhancement on this study, features are generally bland.     No additional pancreatic lesions are seen. There is no evidence of any focal or generalized made pancreatic ductal dilatation on the multiplanar images. Bowel loops are normal in caliber. No pathologic marrow signal changes are seen. Incidental note is   made of patient's moderate S-shaped thoracolumbar scoliosis.    MRCP images show limited detail and most are not diagnostic. The less motion affected images show normal caliber, actually relatively small intra and extra hepatic bile ducts, maximal common duct diameter of between 2 and 2.5 mm. Pancreatic duct is   similar in size, upper limits of normal, and there is mild side branch dilatation. By history, patient has current normal serum lipase.    There appears to be a mildly aberrant duct in the uncinate process, likely as a variant of normal, measuring up to 4 mm in diameter where best seen, difficult to appreciate on most series. The duodenitis seen on prior study is  difficult to appreciate on   MR and no extensive inflammatory change is appreciated.          Impression:      Impression:     1. Water signal pancreatic head lesion, smaller uncinate process and pancreatic tail lesions, likely all representing IPMN's. The 2 smaller lesions have simple cyst appearance; the larger lesion is a multilocular cyst with very thin septations and no   nodularity, septal thickening or debris. By Fukuoka consensus guidelines it is borderline concerning by size greater than 3 cm, but otherwise no worrisome features.    2. Given the inflammatory changes in the region of the pancreatic head and duodenum on CT, the larger lesion could represent a pancreatic pseudocyst. If patient's symptoms improve/resolve, follow-up imaging in 3 months would be suggested. Otherwise,   endoscopic ultrasound could be considered.     3. Cholelithiasis, without evidence of cholecystitis. No evidence of biliary ductal dilatation.    4. No other significant upper abdominal disease is appreciated elsewhere.    Electronically Signed: Alfred Reyes    4/15/2023 11:07 AM EDT    Workstation ID: [id number]            Impression:     --Acute Klebsiella bacteremia/septicemia, urinary source associated with renal/bladder calculi, ongoing ceftriaxone.  Urology following for further functional/anatomic assessment and further intervention at their discretion.    --Renal/bladder calculi.  Left side hydronephrosis improved and subsequent scan and urology following as above.    --Abnormal imaging with concern for duodenitis and pancreatic cystic lesion.    initial scan had raised some concern for possible abscess but subsequent scan less convincing per radiology and instead description of cystic lesions as above.  If stronger evidence for progressive intra-abdominal infection despite current therapy, you could consider empirically broader therapy    --Abnormal creatinine.  Baseline unclear and needs further clarification by medicine team.   Monitor to help guide future adjustments and antimicrobials    --Abnormal LFTs.  Chronicity unclear.  Monitor        PLAN:       -- IV ceftriaxone    -- Check/review labs cultures and scans    -- Partial history per nursing staff    -- Discussed with microbiology    -- Discussed with pharmacy    -- Further imaging pending as per surgical team    -- Highly complex set of issues with high risk for further serious morbidity and other serious sequela    --discussed with Dr. Barnard.  I anticipate him coming to the office daily for IV ceftriaxone after discharge.   Follow-up with me Thursday, April 20    **Copied text in this note has been reviewed and is accurate as of 04/18/23.        Justice Mcmillan MD  4/18/2023

## 2023-04-18 NOTE — PROGRESS NOTES
Nutrition Services    Patient Name:  Abdiaziz Enrique Jr.  YOB: 1935  MRN: [id number]  Admit Date:  4/11/2023    Pt identified 2/2 LOS. No nutrition risk noted at this time. Please consult for intake avg <50% or significant weight change.        Electronically signed by:  Valeria Grayson RD  04/18/23 09:20 EDT

## 2023-04-18 NOTE — CASE MANAGEMENT/SOCIAL WORK
Continued Stay Note   Monica     Patient Name: Abdiaziz Enrique Jr.  MRN: [id number]  Today's Date: 4/18/2023    Admit Date: 4/11/2023    Plan: update   Discharge Plan     Row Name 04/18/23 1145       Plan    Plan update    Patient/Family in Agreement with Plan yes    Plan Comments Patient has orders for discharge.  Received a call from Tracy at [facility] who advises that they wanted to do his infusions at the office but he has [facility] only for a payor source and would have to pay out of pocket.  Contacted [phone] and was sent to a person who indicates that as far as they can tell patient would be covered to go to [facility] for infusions but have a form that needs to be filled out and faxed back to them at [phone] and follow up on that fax by calling [phone].  Spoke to Tracy with [facility] to advise and will fax form to her at [phone].  Spoke with patient and family at bedside regarding discharge plan and advised that  and [facility] are working on getting his antibiotics covered and will advise once everything has been finalized.  Spoke with patient and family at bedside regarding discharge plan and advised that [facility] and  are working on getting his infusions covered by the [facility].  Patient and family verbalized understanding and agreement with plan.  CM following.  Patient plan is to discharge home via car with family to transport.    Final Discharge Disposition Code 01 - home or self-care               Discharge Codes    No documentation.               Expected Discharge Date and Time     Expected Discharge Date Expected Discharge Time    Apr 18, 2023             Mia Phan, RN

## 2023-04-18 NOTE — PLAN OF CARE
Goal Outcome Evaluation:  Plan of Care Reviewed With: patient         Pt. Remain stable, no complain. VSS, Possible home today as per notes. Due care continue.

## 2023-04-18 NOTE — DISCHARGE SUMMARY
"    [facility] Medicine Services  DISCHARGE SUMMARY    Patient Name: Abdiaziz Enrique Jr.  : 1935  MRN: [id number]    Date of Admission: 2023  5:00 PM  Date of Discharge:  2023  Primary Care Physician: System, Provider Not In    Consults     Date and Time Order Name Status Description    2023  9:56 AM Inpatient Infectious Diseases Consult Completed     2023  7:53 AM Inpatient Gastroenterology Consult Completed     2023 12:34 AM Inpatient General Surgery Consult Completed     2023  7:51 AM Inpatient Colorectal Surgery Consult Completed     2023 11:22 PM Inpatient Urology Consult            Hospital Course     Presenting Problem:   Generalized weakness [R53.1]    Active Hospital Problems    Diagnosis  POA   • **Gram-negative bacteremia [R78.81]  Yes   • Generalized weakness [R53.1]  Yes   • Acute UTI [N39.0]  Yes   • Elevated serum creatinine [R79.89]  Yes   • Elevated liver enzymes [R74.8]  Yes   • Hypertension [I10]  Yes   • Gout [M10.9]  Yes   • BPH (benign prostatic hyperplasia) [N40.0]  Yes   • Left obsructing nephrolithiasis [N20.0]  Yes   • Duodenitis [K29.80]  Yes      Resolved Hospital Problems   No resolved problems to display.      ---------final diagnoses-------------  Sepsis due to Klebsiella UTI & Bacteremia  Renal stones and Bladder stone  DAE (due to sepsis and obstructive uropathy), resolved  -continue rocephin daily IV via ID clinic infusion w/ Dr. Mcmillan at discharge  -Dr. Schofield (urology) initially evaluated and at that time, repeat imaging revealed that patient has \"passed\" the left ureteral stone into the bladder. No intervention planned currently, Dr. 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 is a 87 y.o. male w/ BPH, HTN, nephrolithiasis, who was brought for evaluation of generalized weakness and lethargy; UA was concerning for UTI and imaging showed obstructing ureteral stone, large bladder calculus, as well as cystic appearing pancreatic lesion and duodenitis; blood and urine cultures returned positive for Klebsiella. Patient's renal function improved w/ hydration. Urology and ID consulted. Repeat imaging revealed that left ureteral stone had likely \"passed\" and was in bladder. Due to rapid improvement in patient's clinical condition no current urologic procedures planned and Dr. Schofield would like to see back in clinic in 4-6 weeks. Dr. Mcmillan (ID) continuing rocephin iv therapy in his office daily as he is managing the antibiotics.   Of note, patient did have egd (due to above ct imaging) which revealed multiple duodenal ulcers suggestive of ischemic enteropathy likely precipitated by sepsis/hypotension. Biopsy for h.pylori is pending, GI states they will contact patient if positive. BID ppi x 1 month, then once daily is planned.   Of note, patient was medically ready for discharge on 4/18/23 but paperwork (involving insurance/[facility]) was not ready but is likely to be ready today and planning discharge once this is set up.         Discharge Follow Up Recommendations for outpatient labs/diagnostics:  DR. Mcmillan (ID) Thursday 4/20/23  Dr. Schofield (Urology) 4-6 weeks  PCP 1 week    Day of Discharge     HPI:   Feeling well, no pain, no dyspnea, some urinary frequency but slightly improved. No hematuria    Review of Systems  No f/c    Vital Signs:   Temp:  [97.7 °F (36.5 °C)-98.5 °F (36.9 °C)] 98 °F (36.7 °C)  Heart Rate:  [50-85] 69  Resp:  [16-19] 18  BP: (131-152)/(70-83) 150/71      Physical Exam:  Constitutional:Alert, " oriented x 3, nontoxic appearing  Psych:Normal/appropriate affect  HEENT:NCAT, oropharynx clear  Neck: neck supple, full range of motion  Neuro: Face symmetric, speech clear, equal , moves all extremities  Cardiac: RRR; No pretibial pitting edema  Resp: CTAB, normal effort  GI: abd soft, nontender  Skin: No extremity rash  Musculoskeletal/extremities: no cyanosis of extremities; no significant ankle edema        Pertinent  and/or Most Recent Results     LAB RESULTS:      Lab 04/17/23  0543 04/15/23  0632 04/14/23  0523 04/13/23  0337 04/13/23 0332   WBC 12.70* 12.40* 13.13* 13.79*  --    HEMOGLOBIN 12.5* 13.4 12.5* 13.1  --    HEMATOCRIT 39.6 41.1 37.7 39.1  --    PLATELETS 449 377 275 223  --    NEUTROS ABS  --   --   --  11.17*  --    IMMATURE GRANS (ABS)  --   --   --  0.20*  --    LYMPHS ABS  --   --   --  0.91  --    MONOS ABS  --   --   --  1.41*  --    EOS ABS  --   --   --  0.06  --    MCV 98.0* 94.7 95.4 94.0  --    SED RATE  --   --   --  85*  --    CRP  --   --   --   --  23.79*   LACTATE  --   --   --   --  1.0         Lab 04/17/23  0543 04/16/23  0844 04/15/23  0632 04/14/23  0523 04/13/23  0332   SODIUM 140 143 143 138 136   POTASSIUM 4.0 4.4 4.1 3.7 3.9   CHLORIDE 109* 109* 110* 106 104   CO2 22.0 24.0 24.0 23.0 22.0   ANION GAP 9.0 10.0 9.0 9.0 10.0   BUN 21 26* 28* 39* 46*   CREATININE 1.26 1.34* 1.47* 1.66* 1.84*   EGFR 55.2* 51.3* 45.9* 39.7* 35.0*   GLUCOSE 147* 233* 140* 157* 173*   CALCIUM 8.2* 8.3* 8.2* 8.2* 8.0*         Lab 04/17/23  0543 04/15/23  0632 04/14/23  0523 04/13/23  0332   TOTAL PROTEIN 5.7* 5.1* 5.4* 5.6*   ALBUMIN 2.7* 2.8* 2.7* 2.6*   GLOBULIN 3.0 2.3 2.7 3.0   ALT (SGPT) 57* 84* 96* 106*   AST (SGOT) 65* 71* 94* 179*   BILIRUBIN 0.5 0.5 0.6 0.7   ALK PHOS 193* 231* 229* 223*                     Brief Urine Lab Results  (Last result in the past 365 days)      Color   Clarity   Blood   Leuk Est   Nitrite   Protein   CREAT   Urine HCG        04/11/23 1841 Yellow   Cloudy    Large (3+)   Moderate (2+)   Negative   100 mg/dL (2+)               Microbiology Results (last 10 days)     Procedure Component Value - Date/Time    Blood Culture - Blood, Arm, Left [[id number]]  (Abnormal)  (Susceptibility) Collected: 04/11/23 2220    Lab Status: Final result Specimen: Blood from Arm, Left Updated: 04/15/23 0611     Blood Culture Klebsiella pneumoniae ssp pneumoniae     Isolated from Aerobic Bottle     Gram Stain Aerobic Bottle Gram negative bacilli    Susceptibility      Klebsiella pneumoniae ssp pneumoniae      LANE      Ampicillin Resistant     Ampicillin + Sulbactam Susceptible      Cefepime Susceptible      Ceftazidime Susceptible      Ceftriaxone Susceptible      Gentamicin Susceptible      Levofloxacin Susceptible      Piperacillin + Tazobactam Susceptible      Trimethoprim + Sulfamethoxazole Susceptible                       Susceptibility Comments     Klebsiella pneumoniae ssp pneumoniae    Cefazolin sensitivity will not be reported for Enterobacteriaceae in non-urine isolates. If cefazolin is preferred, please call the microbiology lab to request an E-test.  With the exception of urinary-sourced infections, aminoglycosides should not be used as monotherapy.             Blood Culture ID, PCR - Blood, Arm, Left [[id number]]  (Abnormal) Collected: 04/11/23 2220    Lab Status: Final result Specimen: Blood from Arm, Left Updated: 04/13/23 1002     BCID, PCR Klebsiella pneumoniae group. Identification by BCID2 PCR.     BOTTLE TYPE Aerobic Bottle    Narrative:      No resistance genes detected.    Urine Culture - Urine, Urine, Clean Catch [[id number]]  (Abnormal)  (Susceptibility) Collected: 04/11/23 1841    Lab Status: Final result Specimen: Urine, Clean Catch Updated: 04/13/23 0818     Urine Culture >100,000 CFU/mL Klebsiella pneumoniae ssp pneumoniae    Narrative:      Colonization of the urinary tract without infection is common. Treatment is discouraged unless the patient is symptomatic,  pregnant, or undergoing an invasive urologic procedure.    Susceptibility      Klebsiella pneumoniae ssp pneumoniae      LANE      Ampicillin Resistant     Ampicillin + Sulbactam Susceptible      Cefazolin Susceptible      Cefepime Susceptible      Ceftazidime Susceptible      Ceftriaxone Susceptible      Gentamicin Susceptible      Levofloxacin Susceptible      Nitrofurantoin Intermediate      Piperacillin + Tazobactam Susceptible      Trimethoprim + Sulfamethoxazole Susceptible                           Blood Culture - Blood, Arm, Right [[id number]]  (Normal) Collected: 04/11/23 0222    Lab Status: Final result Specimen: Blood from Arm, Right Updated: 04/16/23 2300     Blood Culture No growth at 5 days          CT Abdomen Pelvis Without Contrast    Result Date: 4/11/2023  CT ABDOMEN PELVIS WO CONTRAST Date of Exam: 4/11/2023 10:34 PM EDT Indication: new UTI in male , microscopic hematuria. Comparison: None available. Technique: Axial CT images were obtained of the abdomen and pelvis without the administration of contrast. Reconstructed coronal and sagittal images were also obtained. Automated exposure control and iterative construction methods were used. Findings: The lung bases are clear. Evaluation of the body wall soft tissues demonstrates no acute or suspicious findings. There is advanced multilevel spondylosis and lumbar dextrocurvature. The liver is homogeneous. Cholelithiasis is present with the gallbladder  otherwise nondistended. Homogeneous spleen. No suspicious focal pancreatic lesion. There is extensive bilateral nephrolithiasis, without evidence of obstruction on the right. On the left, there is moderate to severe hydronephrosis and a rounded 6 mm stone is present within the mid portion of the left ureter. A large stone is noted in the bladder. There is severe wall edema with adjacent mesenteric stranding present involving the transverse portion of the duodenum, incompletely characterized given lack of  IV contrast, with appearance concerning for an adjacent inflamed large duodenal diverticulum, with component of small adjacent abscess suspected, measuring approximately 3 cm on image 56 of the axial series. There is no evidence of associated bowel obstruction. There is no overt pneumoperitoneum. Atherosclerotic abdominal aorta. The pelvic viscera demonstrate no additional acute findings.     Impression: Bilateral nephrolithiasis is present, with an obstructing stone present in the midportion of the left ureter, with associated left-sided hydronephrosis and nephropathy. There is severe wall edema with adjacent mesenteric stranding present involving the transverse portion of the duodenum, incompletely characterized given lack of IV contrast, with appearance concerning for an adjacent inflamed large duodenal diverticulum,  with possible small adjacent abscess suspected, measuring approximately 3 cm on image 56 of the axial series. There is no evidence of associated bowel obstruction. Consider contrast-enhanced CT to further evaluate. Electronically Signed: Myron Vyas  4/11/2023 11:08 PM EDT  Workstation ID: [id number]    CT Abdomen Pelvis With Contrast    Result Date: 4/12/2023  CT ABDOMEN PELVIS W CONTRAST Date of Exam: 4/12/2023 1:26 PM EDT Indication: Abdominal pain, acute, nonlocalized, recent abdominal CT without contrast demonstrating possible diverticular abscess associated with the duodenum  Comparison: CT abdomen and pelvis without contrast 4/11/2023 Technique: Axial CT images were obtained of the abdomen and pelvis following the uneventful intravenous administration of 95 mL Isovue 300. Reconstructed coronal and sagittal images were also obtained. Automated exposure control and iterative construction methods were used. Findings: Lung bases without consolidation. The visualized pulmonary arteries are well-opacified with contrast. Heart size normal. Small left pleural effusion stable from prior study with  minimal left basilar atelectasis. The liver is normal in size and contour. Gallbladder present with cholelithiasis noted. No pericholecystic inflammation. The adrenal glands are normal. The spleen is normal in size. Scattered areas of fatty replacement of pancreatic parenchyma. At the pancreatic head there is a low-attenuation cystic lesion measuring 3.6 x 2.2 cm. No upstream main pancreatic duct dilatation. Normal caliber common bile duct. Kidneys are symmetric in size. Previously seen 6 mm obstructing calculus in mid left ureter has resolved. There is mild left urothelial hyperemia and thickening at this level the ureter which relate to inflammation. There are 2 nonobstructing calculi at the left kidney measuring 7 mm and 8 mm which are stable. Mild left hydronephrosis and moderate left renal pelvic dilatation and proximal ureteral dilatation slightly decreased from prior study. Multiple nonobstructing right-sided renal calculi noted for  example at the lower pole measuring 16 mm and mid left kidney measuring 14 mm. Duplicated right renal collecting system. No right ureteral calculus. Large calculus dependently in bladder measures 17 mm, unchanged. Prostatomegaly. Negative for pneumoperitoneum. No bowel obstruction. The appendix is normal. There is hyperemia and inflammatory stranding surrounding the second and third portions of the duodenum suggesting duodenitis. No intramural abscess. The portal vein, splenic vein, superior mesenteric vein are patent. Moderate vascular calcifications of the abdominal aorta with ectasia of infrarenal aorta measuring up to 2.8 x 2.4 cm. Moderate convex right dextrocurvature of the lumbar spine centered at L3. No aggressive osseous lesion or fracture. 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     Impression: 1. Obstructing calculus in mid left ureter resolved. Mild left hydronephrosis and moderate left proximal ureter dilatation slightly  improved. Bilateral nonobstructing nephrolithiasis with large calculus in bladder. 2. Inflammatory stranding surrounding second and third portion duodenum consistent with duodenitis. 3. 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 4. Stable small left pleural effusion. 5. Cholelithiasis, prostatomegaly, and chronic findings above. Electronically Signed: Gurinder Jeffers  4/12/2023 2:30 PM EDT  Workstation ID: [id number]    XR Chest 1 View    Result Date: 4/11/2023  XR CHEST 1 VW Date of Exam: 4/11/2023 5:22 PM EDT Indication: Weak/Dizzy/AMS triage protocol. Comparison: None available. FINDINGS: Mild chronic changes of the lung fields are present without focal airspace opacity. There is no significant pleural effusion or distinct pneumothorax. Normal heart and mediastinal contours.     Mild chronic changes of the lung fields without evidence of acute cardiopulmonary abnormality. Electronically Signed: Myron Vyas  4/11/2023 6:10 PM EDT  Workstation ID: [id number]    MRI abdomen wo contrast mrcp    Result Date: 4/15/2023  MRI ABDOMEN WO CONTRAST MRCP Date of Exam: 4/14/2023 8:20 PM EDT Indication: pancreatic lesion, elevated LFTs.  Comparison: 4/12/2023 abdomen pelvis CT scan Technique:  Routine multiplanar/multisequence images of the abdomen were obtained with MRCP sequences without contrast administration. Findings: Previous study report noted 3.6 x 2.2 cm cystic lesion of the pancreatic head favored to be primary cystic lesion such as IPMN. Second and third portion duodenal inflammation. Also improving left obstructive uropathy. Today's multiplanar images show trace left basilar pleural fluid and atelectasis. Multiple gallstones are seen in the otherwise normal-appearing gallbladder. Liver morphology appears normal. No hepatic lesions are appreciated. The spleen is not enlarged.. Regarding the  pancreas, there are actually 2 water signal lesions, a small sharply defined simple appearing 14 x 9 mm lesion pancreatic tail, faintly suggested in retrospect on the prior study, and favored to represent a sidebranch IPMN.. Patient's pancreatic head lesion is very sharply defined, measures water signal, has a slightly lobular outline and measures 3.1 x 3.4 cm. There are a few very thin internal septations, no evidence of debris or nodule. Although it cannot be evaluated for enhancement on this study, features are generally bland. No additional pancreatic lesions are seen. There is no evidence of any focal or generalized made pancreatic ductal dilatation on the multiplanar images. Bowel loops are normal in caliber. No pathologic marrow signal changes are seen. Incidental note is made of patient's moderate S-shaped thoracolumbar scoliosis. MRCP images show limited detail and most are not diagnostic. The less motion affected images show normal caliber, actually relatively small intra and extra hepatic bile ducts, maximal common duct diameter of between 2 and 2.5 mm. Pancreatic duct is similar in size, upper limits of normal, and there is mild side branch dilatation. By history, patient has current normal serum lipase. There appears to be a mildly aberrant duct in the uncinate process, likely as a variant of normal, measuring up to 4 mm in diameter where best seen, difficult to appreciate on most series. The duodenitis seen on prior study is difficult to appreciate on MR and no extensive inflammatory change is appreciated.     Impression: 1. Water signal pancreatic head lesion, smaller uncinate process and pancreatic tail lesions, likely all representing IPMN's. The 2 smaller lesions have simple cyst appearance; the larger lesion is a multilocular cyst with very thin septations and no nodularity, septal thickening or debris. By Fuk[place]oka consensus guidelines it is borderline concerning by size greater than 3 cm, but  otherwise no worrisome features. 2. Given the inflammatory changes in the region of the pancreatic head and duodenum on CT, the larger lesion could represent a pancreatic pseudocyst. If patient's symptoms improve/resolve, follow-up imaging in 3 months would be suggested. Otherwise, endoscopic ultrasound could be considered. 3. Cholelithiasis, without evidence of cholecystitis. No evidence of biliary ductal dilatation. 4. No other significant upper abdominal disease is appreciated elsewhere. Electronically Signed: Alfred Reyes  4/15/2023 11:07 AM EDT  Workstation ID: [id number]    CT Head Without Contrast Stroke Protocol    Result Date: 4/11/2023  CT HEAD WO CONTRAST STROKE PROTOCOL Date of Exam: 4/11/2023 10:31 PM EDT Indication: aMS, ATAXIA. Comparison: None available. Technique: Axial CT images were obtained of the head without contrast administration.  Reconstructed coronal and sagittal images were also obtained. Automated exposure control and iterative construction methods were used. FINDINGS: Gray-white differentiation is maintained and there is no evidence of intracranial hemorrhage, mass or mass effect. Age-related changes of the brain are present including volume loss and typical periventricular sequela of chronic small vessel ischemia. There is otherwise no evidence of intracranial hemorrhage, mass or mass effect. The ventricles are normal in size and configuration accounting for surrounding volume loss. The orbits are normal and the paranasal sinuses are grossly clear.     Age-related changes of the brain as above, otherwise without evidence of acute intracranial abnormality.  Electronically Signed: Myron Vyas  4/11/2023 10:56 PM EDT  Workstation ID: [id number]                  Plan for Follow-up of Pending Labs/Results: pcp & Dr. Mcmillan (ID)    Discharge Details        Discharge Medications      New Medications      Instructions Start Date   pantoprazole 40 MG EC tablet  Commonly known as: Protonix   40 mg,  Oral, 2 Times Daily         Changes to Medications      Instructions Start Date   amLODIPine 5 MG tablet  Commonly known as: NORVASC  What changed: Another medication with the same name was removed. Continue taking this medication, and follow the directions you see here.   5 mg, Oral, Daily         Continue These Medications      Instructions Start Date   colchicine 0.6 MG tablet   0.6 mg, Oral      finasteride 5 MG tablet  Commonly known as: PROSCAR   5 mg, Oral, Daily      fluticasone 50 MCG/ACT nasal spray  Commonly known as: FLONASE   2 sprays, Nasal, Daily      losartan 50 MG tablet  Commonly known as: COZAAR   100 mg, Oral, Daily      metFORMIN 500 MG tablet  Commonly known as: GLUCOPHAGE   500 mg, Oral, 2 Times Daily With Meals      tamsulosin 0.4 MG capsule 24 hr capsule  Commonly known as: FLOMAX   1 capsule, Oral, Nightly         Stop These Medications    hydroCHLOROthiazide 12.5 MG capsule  Commonly known as: MICROZIDE     mupirocin 2 % ointment  Commonly known as: BACTROBAN            No Known Allergies      Discharge Disposition:  Home or Self Care    Diet:  Hospital:  Diet Order   Procedures   • Diet: Regular/House Diet; Texture: Regular Texture (IDDSI 7); Fluid Consistency: Thin (IDDSI 0)       Activity:           CODE STATUS:    Code Status and Medical Interventions:   Ordered at: 04/11/23 2767     Level Of Support Discussed With:    Patient     Code Status (Patient has no pulse and is not breathing):    CPR (Attempt to Resuscitate)     Medical Interventions (Patient has pulse or is breathing):    Full Support       No future appointments.    Additional Instructions for the Follow-ups that You Need to Schedule     Discharge Follow-up with PCP   As directed       Currently Documented PCP:    System, Provider Not In    PCP Phone Number:    None     Follow Up Details: 1 week         Discharge Follow-up with Specialty: Dr. Schofield (urology) 4-6 weeks   As directed      Specialty: Dr. Schofield (urology) 4-6  weeks         Discharge Follow-up with Specialty: Dr. Mcmillan (ID) Thursday 4/20/23   As directed      Specialty: Dr. Mcmillan (ID) Thursday 4/20/23                     Miguel Barnard MD  04/19/23      Time Spent on Discharge:  I spent  35  minutes on this discharge activity which included: face-to-face encounter with the patient, reviewing the data in the system, coordination of the care with the nursing staff as well as consultants, documentation, and entering orders.

## 2023-04-19 ENCOUNTER — READMISSION MANAGEMENT (OUTPATIENT)
Dept: CALL CENTER | Facility: HOSPITAL | Age: 88
End: 2023-04-19
Payer: OTHER GOVERNMENT

## 2023-04-19 VITALS
DIASTOLIC BLOOD PRESSURE: 65 MMHG | RESPIRATION RATE: 18 BRPM | TEMPERATURE: 98 F | WEIGHT: 180 LBS | HEART RATE: 65 BPM | OXYGEN SATURATION: 95 % | HEIGHT: 68 IN | SYSTOLIC BLOOD PRESSURE: 157 MMHG | BODY MASS INDEX: 27.28 KG/M2

## 2023-04-19 LAB
GLUCOSE BLDC GLUCOMTR-MCNC: 117 MG/DL (ref 70–130)
GLUCOSE BLDC GLUCOMTR-MCNC: 121 MG/DL (ref 70–130)

## 2023-04-19 PROCEDURE — 82962 GLUCOSE BLOOD TEST: CPT

## 2023-04-19 PROCEDURE — 97110 THERAPEUTIC EXERCISES: CPT

## 2023-04-19 PROCEDURE — 97116 GAIT TRAINING THERAPY: CPT

## 2023-04-19 PROCEDURE — 25010000002 CEFTRIAXONE PER 250 MG: Performed by: INTERNAL MEDICINE

## 2023-04-19 PROCEDURE — 99231 SBSQ HOSP IP/OBS SF/LOW 25: CPT | Performed by: INTERNAL MEDICINE

## 2023-04-19 RX ORDER — SODIUM CHLORIDE 9 MG/ML
250 INJECTION, SOLUTION INTRAVENOUS ONCE
Status: CANCELLED | OUTPATIENT
Start: 2023-04-20

## 2023-04-19 RX ADMIN — PANTOPRAZOLE SODIUM 40 MG: 40 INJECTION, POWDER, LYOPHILIZED, FOR SOLUTION INTRAVENOUS at 09:22

## 2023-04-19 RX ADMIN — CEFTRIAXONE 2 G: 2 INJECTION, POWDER, FOR SOLUTION INTRAMUSCULAR; INTRAVENOUS at 12:35

## 2023-04-19 RX ADMIN — TAMSULOSIN HYDROCHLORIDE 0.4 MG: 0.4 CAPSULE ORAL at 09:22

## 2023-04-19 RX ADMIN — AMLODIPINE BESYLATE 5 MG: 5 TABLET ORAL at 09:22

## 2023-04-19 RX ADMIN — Medication 10 ML: at 09:23

## 2023-04-19 NOTE — PROGRESS NOTES
[facility] Medicine Services  PROGRESS NOTE    Patient Name: Abdiaziz Enrique Jr.  : 1935  MRN: [id number]    Date of Admission: 2023  Primary Care Physician: System, Provider Not In    Subjective   Subjective     CC:  Follow up bacteremia    HPI:  Feels well. Reading in room    ROS:  Gen- No fevers, chills  CV- No chest pain, palpitations  Resp- No cough, dyspnea  GI- No N/V/D, abd pain     Objective   Objective     Vital Signs:   Temp:  [97.7 °F (36.5 °C)-98.5 °F (36.9 °C)] 98 °F (36.7 °C)  Heart Rate:  [50-85] 69  Resp:  [16-19] 18  BP: (131-152)/(70-83) 150/71     Physical Exam:  Constitutional: alert, pleasant, no distress, interactive, nontoxic appearing, comfortably sitting upright reading  HENT: NCAT, mucous membranes moist  Respiratory: ctab  Cardiovascular: RRR, palpable radial pulses  Gastrointestinal: Positive bowel sounds, soft, nontender, nondistended  Musculoskeletal: No bilateral ankle edema  Psychiatric: Appropriate affect, cooperative  Neurologic: face symmetric, speech clear, moves all extremities    Results Reviewed:  LAB RESULTS:      Lab 23  0543 04/15/23  0632 23  0523 23  0337 23  0332   WBC 12.70* 12.40* 13.13* 13.79*  --    HEMOGLOBIN 12.5* 13.4 12.5* 13.1  --    HEMATOCRIT 39.6 41.1 37.7 39.1  --    PLATELETS 449 377 275 223  --    NEUTROS ABS  --   --   --  11.17*  --    IMMATURE GRANS (ABS)  --   --   --  0.20*  --    LYMPHS ABS  --   --   --  0.91  --    MONOS ABS  --   --   --  1.41*  --    EOS ABS  --   --   --  0.06  --    MCV 98.0* 94.7 95.4 94.0  --    SED RATE  --   --   --  85*  --    CRP  --   --   --   --  23.79*   LACTATE  --   --   --   --  1.0         Lab 23  0543 23  0844 04/15/23  0632 23  0523 23  0332   SODIUM 140 143 143 138 136   POTASSIUM 4.0 4.4 4.1 3.7 3.9   CHLORIDE 109* 109* 110* 106 104   CO2 22.0 24.0 24.0 23.0 22.0   ANION GAP 9.0 10.0 9.0 9.0 10.0   BUN 21 26* 28* 39* 46*    CREATININE 1.26 1.34* 1.47* 1.66* 1.84*   EGFR 55.2* 51.3* 45.9* 39.7* 35.0*   GLUCOSE 147* 233* 140* 157* 173*   CALCIUM 8.2* 8.3* 8.2* 8.2* 8.0*         Lab 04/17/23  0543 04/15/23  0632 04/14/23  0523 04/13/23  0332   TOTAL PROTEIN 5.7* 5.1* 5.4* 5.6*   ALBUMIN 2.7* 2.8* 2.7* 2.6*   GLOBULIN 3.0 2.3 2.7 3.0   ALT (SGPT) 57* 84* 96* 106*   AST (SGOT) 65* 71* 94* 179*   BILIRUBIN 0.5 0.5 0.6 0.7   ALK PHOS 193* 231* 229* 223*                     Brief Urine Lab Results  (Last result in the past 365 days)      Color   Clarity   Blood   Leuk Est   Nitrite   Protein   CREAT   Urine HCG        04/11/23 1841 Yellow   Cloudy   Large (3+)   Moderate (2+)   Negative   100 mg/dL (2+)                 Microbiology Results Abnormal     Procedure Component Value - Date/Time    Blood Culture - Blood, Arm, Right [[id number]]  (Normal) Collected: 04/11/23 0222    Lab Status: Final result Specimen: Blood from Arm, Right Updated: 04/16/23 2300     Blood Culture No growth at 5 days          No radiology results from the last 24 hrs        Current medications:  Scheduled Meds:amLODIPine, 5 mg, Oral, Q24H  cefTRIAXone, 2 g, Intravenous, Q24H  insulin lispro, 0-7 Units, Subcutaneous, TID AC  pantoprazole, 40 mg, Intravenous, BID AC  senna-docusate sodium, 2 tablet, Oral, BID  sodium chloride, 10 mL, Intravenous, Q12H  tamsulosin, 0.4 mg, Oral, Daily      Continuous Infusions:lactated ringers, 9 mL/hr      PRN Meds:.•  senna-docusate sodium **AND** polyethylene glycol **AND** bisacodyl **AND** bisacodyl  •  dextrose  •  dextrose  •  glucagon (human recombinant)  •  melatonin  •  ondansetron  •  sodium chloride  •  sodium chloride  •  sodium chloride    Assessment & Plan   Assessment & Plan     Active Hospital Problems    Diagnosis  POA   • **Gram-negative bacteremia [R78.81]  Yes   • Generalized weakness [R53.1]  Yes   • Acute UTI [N39.0]  Yes   • Elevated serum creatinine [R79.89]  Yes   • Elevated liver enzymes [R74.8]  Yes   •  Hypertension [I10]  Yes   • Gout [M10.9]  Yes   • BPH (benign prostatic hyperplasia) [N40.0]  Yes   • Left obsructing nephrolithiasis [N20.0]  Yes   • Duodenitis [K29.80]  Yes      Resolved Hospital Problems   No resolved problems to display.        Brief Hospital Course to date:  Abdiaziz Enrique Jr. is a 87 y.o. male w/ BPH, HTN, nephrolithiasis, who was brought for evaluation of generalized weakness and lethargy; UA was concerning for UTI and imaging showed obstructing ureteral stone, large bladder calculus, as well as cystic appearing pancreatic lesion and duodenitis; blood and urine cultures have returned positive for Klebsiella    Assessment/Plan    Klebsiella complex UTI + bacteremia  LT ureterolithiasis w/ mild hydronephrosis, improved  Large bladder calculus  BPH  -repeat CT imaging shows resolution of obstructing LT ureteral stone and large bladder stone  -seen by Uro Dr. Sony Schofield on 4/12/23: feels like patient passed the left ureteral stone into the bladder. Renal function has imroved/normalized and currently afebrile and pain free. I discussed w/ Dr. Schofield on 4/17/23, he is not planning on any further intervention currently and wishes to see patient in 4-6 weeks  -cont flomax  -ID following: on Rocephin IV daily, to be discharged later today if [facility] paperwork/insurance paperwork goes through so can get infusions at [facility] clinic.  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 H.pylori path pending. GI recommends bid ppi x 1 month, then daily. GI will communicate h.pylori biopsy results to patient if positive. Due to age does not recommend future surveillance of IPMN's (of note, very mild recurrent focal interstitial  pancreatitis is common in associate w/ IPMN's, and may often be seen on imagign/labs without significant symptoms).    DAE, improved  -initial creatining 1.99; improved to 1.26 on 4/17/23    DM type 2, a1c 6.9%, w/o long term use of insulin  -holding metformin  -add SSI    HTN  -restarted home amlodipine; losartan and HCTZ on hold currently, plan to restart on discharge      Expected Discharge Location and Transportation: home   Expected Discharge  Today 4/19/23 (pending final abx plans per Dr. Mcmillan)  Follow up: Dr. Sony Schofield (urology) 4-6 weeks; Dr. Mcmillan (ID) per his recs      DVT prophylaxis:  Mechanical DVT prophylaxis orders are present.     AM-PAC 6 Clicks Score (PT): 20 (04/18/23 0800)    CODE STATUS:   Code Status and Medical Interventions:   Ordered at: 04/11/23 3136     Level Of Support Discussed With:    Patient     Code Status (Patient has no pulse and is not breathing):    CPR (Attempt to Resuscitate)     Medical Interventions (Patient has pulse or is breathing):    Full Support       Miguel Barnard MD  04/19/23

## 2023-04-19 NOTE — CASE MANAGEMENT/SOCIAL WORK
Case Management Discharge Note      Final Note: Patient has orders to discharge today.  Spoke with Tracy at [facility] office who now advise that [facility] will not be able to provided patient infusion but instead he will need to go to [facility] and request CM make those arrangements.  Order has been placed for infusion x1wk and labs x2wks.  Called [facility] who can provide infusion and has made an apppointment tomorrow at 1500, they will draw his labs on 4/24 but patient will need to go to  [facility] for his lab draw on 5/1.  Called [facility] who can do the lab draw if order placed in system of patient has order in hand.  Called [facility] and spoke to  who advised that patient is following up with MD tomorrow and they will provide him with Lab Orders then.  Spoke with patient daughter at bedside regarding discharge plan, patient sleeping and did not arouse and advised that he has an appointment at 1500 tomorrow at  [facility] and an appointment at 0900 at [facility].  She verbalizes understanding and agreement with plan.  Patient discharging home today via car with family to transport.         Selected Continued Care - Admitted Since 4/11/2023     Destination    No services have been selected for the patient.              Durable Medical Equipment    No services have been selected for the patient.              Dialysis/Infusion    No services have been selected for the patient.              Home Medical Care    No services have been selected for the patient.              Therapy    No services have been selected for the patient.              Community Resources    No services have been selected for the patient.              Community & DME    No services have been selected for the patient.                       Final Discharge Disposition Code: 01 - home or self-care

## 2023-04-19 NOTE — PROGRESS NOTES
"Abdiaziz Enrique .  1935  [id number]      Chief Complaint: fatigue    Reason for Consultation: bactermic urinary infection    History of present illness:     Patient is a 87 y.o.  Yr old male not a great historian with respect to past medical care, daughter is present to give information, with history of renal calculi/bladder calculi and prior care at the [facility], has had cystoscopy previously and a prior stent although the specifics of that are not clear.  Daughter had been out of town, reports returning to town and finding her dad confused, lethargic and poor p.o. intake with dysuria; diagnosed with acute UTI and abnormal CT scan with concern for obstructing stone at the left ureter/bilateral nephrolithiasis in addition to severe wall edema involving the duodenum and possibility for large inflamed duodenal diverticulum and possible adjacent abscess on April 11.  Repeat CT scan April 12 with obstructing calculus in the mid left ureter resolved, mild left hydronephrosis/moderate left proximal ureteral dilation improved per radiology with large calculus in the bladder.  Duodenitis noted with cystic lesion in the pancreatic head but no other description of abscess.  Surgery has seen him.    4/15/23 had EGD and abd MRI;  \"EGD shows multiple ulcers in the duodenal bulb and second portion duodenum\" per GI    4/18/23  working on plan regarding VA approval     4/19/23 anticipating discharge if VA approval achieved; up in bedside chair, hoping to get home ;    Denies any current abdominal pain.  Appetite diminished but denies overt vomiting/diarrhea and no hematochezia melena or hematemesis.   previous urinary urgency/frequency but denies having to strain and no current dysuria.  No hematuria or pyuria.  Currently no flank pain    No headache photophobia or neck stiffness.  No shortness of breath cough or hemoptysis.  No skin rash      Past Medical History:   Diagnosis Date   • Bladder stone  " "  • BPH (benign prostatic hyperplasia) 04/11/2023   • Gout 04/11/2023   • Hypertension 04/11/2023       Past Surgical History:   Procedure Laterality Date   • CYSTOSCOPY     • CYSTOSCOPY W/ URETERAL STENT PLACEMENT     • ENDOSCOPY N/A 4/15/2023    Procedure: ESOPHAGOGASTRODUODENOSCOPY;  Surgeon: Brunner, Mark I, MD;  Location: [facility] ENDOSCOPY;  Service: Gastroenterology;  Laterality: N/A;   • TONSILLECTOMY         Pediatric History   Patient Parents   • Not on file     Other Topics Concern   • Not on file   Social History Narrative   • Not on file       family history includes Alcohol abuse in his father; COPD in his mother; Cerebral aneurysm in his father; Dementia in his mother.    No Known Allergies     Review of Systems    4/19/23  Per nursing also    Constitutional--fever better with no chills or sweats.  Appetite improving with fatigue.  Heent-- No new vision, hearing or throat complaints.  No epistaxis or oral sores.  Denies odynophagia or dysphagia.  No flashers, floaters or eye pain. No odynophagia or dysphagia. No headache, photophobia or neck stiffness.  CV-- No chest pain, palpitation or syncope  Resp-- No SOB/cough/Hemoptysis  GI-see above.   No nausea, vomiting, or diarrhea.  No hematochezia, melena, or hematemesis. Denies jaundice or chronic liver disease.  --see above  Lymph- no swollen lymph nodes in neck/axilla or groin.   Heme- No active bruising or bleeding; no Hx of DVT or PE.  MS-- no swelling or pain in the bones or joints of arms/legs.  No new back pain.  Neuro-- No acute focal weakness or numbness in the arms or legs.  No seizures.    Full 12 point review of systems reviewed and negative otherwise for acute complaints, except for above    Physical Exam:   Vital Signs   /71 (BP Location: Right arm, Patient Position: Sitting)   Pulse 69   Temp 98 °F (36.7 °C) (Oral)   Resp 18   Ht 172.7 cm (68\")   Wt 81.6 kg (180 lb)   SpO2 93%   BMI 27.37 kg/m²     GENERAL: sleepy, in no acute " distress.  Chronically ill-appearing  HEENT: Normocephalic, atraumatic. No conjunctival injection. No icterus. Oropharynx clear without evidence of thrush or exudate. No evidence of peridontal disease.    NECK: Supple without nuchal rigidity. No mass.  HEART: RRR; No murmur, rubs, gallops.   LUNGS: Clear to auscultation bilaterally without wheezing, rales, rhonchi. Normal respiratory effort. Nonlabored. No dullness.  ABDOMEN: Soft, nontender, nondistended. Positive bowel sounds. No rebound or guarding. NO mass or HSM.  EXT:  No cyanosis, clubbing or edema. No cord.  MSK: FROM without joint effusions noted arms/legs.    SKIN: Warm and dry without cutaneous eruptions on Inspection/palpation.    NEURO: sleepy    No peripheral stigmata/phenomena of endocarditis    IV without obvious redness or drainage    Laboratory Data    Results from last 7 days   Lab Units 04/17/23  0543 04/15/23  0632 04/14/23  0523   WBC 10*3/mm3 12.70* 12.40* 13.13*   HEMOGLOBIN g/dL 12.5* 13.4 12.5*   HEMATOCRIT % 39.6 41.1 37.7   PLATELETS 10*3/mm3 449 377 275     Results from last 7 days   Lab Units 04/17/23  0543   SODIUM mmol/L 140   POTASSIUM mmol/L 4.0   CHLORIDE mmol/L 109*   CO2 mmol/L 22.0   BUN mg/dL 21   CREATININE mg/dL 1.26   GLUCOSE mg/dL 147*   CALCIUM mg/dL 8.2*     Results from last 7 days   Lab Units 04/17/23  0543   ALK PHOS U/L 193*   BILIRUBIN mg/dL 0.5   ALT (SGPT) U/L 57*   AST (SGOT) U/L 65*     Results from last 7 days   Lab Units 04/13/23  0337   SED RATE mm/hr 85*     Results from last 7 days   Lab Units 04/13/23  0332   CRP mg/dL 23.79*       Estimated Creatinine Clearance: 47.7 mL/min (by C-G formula based on SCr of 1.26 mg/dL).      Microbiology:      Radiology:  Imaging Results (Last 72 Hours)     ** No results found for the last 72 hours. **            Impression:     --Acute Klebsiella bacteremia/septicemia, urinary source associated with renal/bladder calculi, ongoing ceftriaxone.  Urology following for further  functional/anatomic assessment and further intervention at their discretion.    --Renal/bladder calculi.  Left side hydronephrosis improved and subsequent scan and urology following as above.    --Abnormal imaging with concern for duodenitis and pancreatic cystic lesion.    initial scan had raised some concern for possible abscess but subsequent scan less convincing per radiology and instead description of cystic lesions as above.  If stronger evidence for progressive intra-abdominal infection despite current therapy, you could consider empirically broader therapy    --Abnormal creatinine.  Baseline unclear and needs further clarification by medicine team.  Monitor to help guide future adjustments and antimicrobials    --Abnormal LFTs.  Chronicity unclear.  Monitor        PLAN:       -- IV ceftriaxone    -- Check/review labs cultures and scans    -- Partial history per nursing staff    -- Discussed with microbiology    -- Discussed with pharmacy    -- Further imaging pending as per surgical team    -- Highly complex set of issues with high risk for further serious morbidity and other serious sequela    --discussed with Dr. Barnard.  I anticipate him coming to the office daily for IV ceftriaxone after discharge.   Follow-up with me Thursday, April 20; Case management working on [facility] approval    **Copied text in this note has been reviewed and is accurate as of 04/19/23.        Justice Mcmillan MD  4/19/2023

## 2023-04-19 NOTE — PLAN OF CARE
Goal Outcome Evaluation:  Plan of Care Reviewed With: patient            Pt. Spent a good shift, no complain. VS stable. Ambulate to bathroom with 1 assist BM times 2. U/O Adequate, Awaiting approval and discharge to Rehab.

## 2023-04-19 NOTE — PLAN OF CARE
Goal Outcome Evaluation:  Plan of Care Reviewed With: patient        Progress: no change  Outcome Evaluation: patient ambulated 400 feet with SBA and rolling walker for support, cues to keep walker close and  to avoiding obstacles in hallway, slightly unsteady gait but no LOB noticed, distance this date limited by weakness. Recommend home with assist and OPPT.

## 2023-04-19 NOTE — THERAPY TREATMENT NOTE
Patient Name: Abdiaziz Enrique Jr.  : 1935    MRN: [id number]                              Today's Date: 2023       Admit Date: 2023    Visit Dx:     ICD-10-CM ICD-9-CM   1. Generalized weakness  R53.1 780.79   2. Acute kidney injury  N17.9 584.9   3. Hyponatremia  E87.1 276.1   4. Confusion  R41.0 298.9   5. Acute UTI  N39.0 599.0   6. Elevated liver enzymes  R74.8 790.5   7. Duodenitis  K29.80 535.60     Patient Active Problem List   Diagnosis   • Generalized weakness   • Acute UTI   • Elevated serum creatinine   • Elevated liver enzymes   • Hypertension   • Gout   • BPH (benign prostatic hyperplasia)   • Left obsructing nephrolithiasis   • Gram-negative bacteremia   • Duodenitis     Past Medical History:   Diagnosis Date   • Bladder stone    • BPH (benign prostatic hyperplasia) 2023   • Gout 2023   • Hypertension 2023     Past Surgical History:   Procedure Laterality Date   • CYSTOSCOPY     • CYSTOSCOPY W/ URETERAL STENT PLACEMENT     • ENDOSCOPY N/A 4/15/2023    Procedure: ESOPHAGOGASTRODUODENOSCOPY;  Surgeon: Brunner, Mark I, MD;  Location: [facility] ENDOSCOPY;  Service: Gastroenterology;  Laterality: N/A;   • TONSILLECTOMY        General Information     Row Name 23 0932          Physical Therapy Time and Intention    Document Type therapy note (daily note)  -AS     Mode of Treatment physical therapy  -AS     Row Name 23 0932          General Information    Patient Profile Reviewed yes  -AS     Existing Precautions/Restrictions fall  -AS     Barriers to Rehab medically complex;hearing deficit  -AS     Row Name 23 0932          Cognition    Orientation Status (Cognition) oriented x 3  -AS     Row Name 23 0932          Safety Issues, Functional Mobility    Safety Issues Affecting Function (Mobility) awareness of need for assistance;insight into deficits/self-awareness;safety precaution awareness;safety precautions follow-through/compliance;sequencing  abilities;positioning of assistive device  -AS     Impairments Affecting Function (Mobility) balance;coordination;endurance/activity tolerance;strength  -AS     Comment, Safety Issues/Impairments (Mobility) alert and following commands, [place]  -AS           User Key  (r) = Recorded By, (t) = Taken By, (c) = Cosigned By    Initials Name Provider Type    AS Nina Benitez PTA Physical Therapist Assistant               Mobility     Row Name 04/19/23 0933          Bed Mobility    Comment, (Bed Mobility) sitting UIC pre/post tx  -AS     Row Name 04/19/23 0933          Transfers    Comment, (Transfers) verbal cues for hand placement  -AS     Row Name 04/19/23 0933          Sit-Stand Transfer    Sit-Stand [place] (Transfers) standby assist  -AS     Assistive Device (Sit-Stand Transfers) walker, front-wheeled  -AS     Comment, (Sit-Stand Transfer) cues for sequencing  -AS     Row Name 04/19/23 0933          Gait/Stairs (Locomotion)    [place] Level (Gait) standby assist  -AS     Assistive Device (Gait) walker, front-wheeled  -AS     Distance in Feet (Gait) 400  -AS     Deviations/Abnormal Patterns (Gait) bilateral deviations;fletcher decreased;gait speed decreased  -AS     Bilateral Gait Deviations forward flexed posture  -AS     Comment, (Gait/Stairs) patient ambulated 400 feet with SBA and rolling walker for support, cues to keep walker close adn to avoiding obstacles in hallway, slightly unsteady gait no LOB noticed, distance this date limited by weakness.  -AS           User Key  (r) = Recorded By, (t) = Taken By, (c) = Cosigned By    Initials Name Provider Type    AS Nina Benitez PTA Physical Therapist Assistant               Obj/Interventions     Row Name 04/19/23 0936          Motor Skills    Therapeutic Exercise knee;ankle  -AS     Row Name 04/19/23 0936          Knee (Therapeutic Exercise)    Knee (Therapeutic Exercise) strengthening exercise  -AS     Knee Strengthening (Therapeutic Exercise)  bilateral;marching while seated;LAQ (long arc quad);sitting;10 repetitions  -AS     [facility] Name 04/19/23 0936          Ankle (Therapeutic Exercise)    Ankle (Therapeutic Exercise) AROM (active range of motion)  -AS     Ankle AROM (Therapeutic Exercise) bilateral;plantarflexion;dorsiflexion;sitting;10 repetitions  -AS     [facility] Name 04/19/23 0936          Balance    Dynamic Standing Balance verbal cues;contact guard;1-person assist  -AS     Position/Device Used, Standing Balance supported;walker, front-wheeled  -AS           User Key  (r) = Recorded By, (t) = Taken By, (c) = Cosigned By    Initials Name Provider Type    AS Nina Benitez, RASTA Physical Therapist Assistant               Goals/Plan    No documentation.                Clinical Impression     [facility] Name 04/19/23 0936          Pain    Pretreatment Pain Rating 0/10 - no pain  -AS     Posttreatment Pain Rating 0/10 - no pain  -AS     Row Name 04/19/23 0936          Plan of Care Review    Plan of Care Reviewed With patient  -AS     Progress no change  -AS     Outcome Evaluation patient ambulated 400 feet with SBA and rolling walker for support, cues to keep walker close and  to avoiding obstacles in hallway, slightly unsteady gait but no LOB noticed, distance this date limited by weakness. Recommend home with assist and OPPT.  -AS     [facility] 04/19/23 0936          Positioning and Restraints    Pre-Treatment Position sitting in chair/recliner  -AS     Post Treatment Position chair  -AS     In Chair reclined;call light within reach;encouraged to call for assist;exit alarm on;waffle cushion;legs elevated  -AS           User Key  (r) = Recorded By, (t) = Taken By, (c) = Cosigned By    Initials Name Provider Type    AS Nina Benitez PTA Physical Therapist Assistant               Outcome Measures     Row Name 04/19/23 0937          How much help from another person do you currently need...    Turning from your back to your side while in flat bed without  using bedrails? 4  -AS     Moving from lying on back to sitting on the side of a flat bed without bedrails? 3  -AS     Moving to and from a bed to a chair (including a wheelchair)? 3  -AS     Standing up from a chair using your arms (e.g., wheelchair, bedside chair)? 4  -AS     Climbing 3-5 steps with a railing? 3  -AS     To walk in hospital room? 3  -AS     AM-PAC 6 Clicks Score (PT) 20  -AS     Highest level of mobility 6 --> Walked 10 steps or more  -AS     Row Name 04/19/23 0937          Functional Assessment    Outcome Measure Options AM-PAC 6 Clicks Basic Mobility (PT)  -AS           User Key  (r) = Recorded By, (t) = Taken By, (c) = Cosigned By    Initials Name Provider Type    AS Nina Benitez PTA Physical Therapist Assistant                             Physical Therapy Education     Title: PT OT SLP Therapies (In Progress)     Topic: Physical Therapy (In Progress)     Point: Mobility training (In Progress)     Learning Progress Summary           Patient Acceptance, E, NR by AS at 4/19/2023 0937    Acceptance, E, VU,NR by ML at 4/17/2023 1606    Acceptance, E, NR by BA at 4/15/2023 1643    Acceptance, E,D, VU,NR by LR at 4/12/2023 1438    Comment: Educated on benefits of mobility and being OOB. Educated on safety with mobility, correct supine to sit t/f technique, correct sit<->stand t/f technique, correct gait mechanics, and progression of POC.   Family Acceptance, E, VU,NR by ML at 4/17/2023 1606                   Point: Home exercise program (In Progress)     Learning Progress Summary           Patient Acceptance, E, NR by AS at 4/19/2023 0937    Acceptance, E,D, VU,NR by LR at 4/12/2023 1438    Comment: Educated on benefits of mobility and being OOB. Educated on safety with mobility, correct supine to sit t/f technique, correct sit<->stand t/f technique, correct gait mechanics, and progression of POC.                   Point: Body mechanics (In Progress)     Learning Progress Summary            Patient Acceptance, E, NR by AS at 4/19/2023 0937    Acceptance, E, NR by BA at 4/15/2023 1643    Acceptance, E,D, VU,NR by LR at 4/12/2023 1438    Comment: Educated on benefits of mobility and being OOB. Educated on safety with mobility, correct supine to sit t/f technique, correct sit<->stand t/f technique, correct gait mechanics, and progression of POC.                   Point: Precautions (In Progress)     Learning Progress Summary           Patient Acceptance, E, NR by AS at 4/19/2023 0937    Acceptance, E, VU,NR by ML at 4/17/2023 1606    Acceptance, E, NR by BA at 4/15/2023 1643    Acceptance, E,D, VU,NR by LR at 4/12/2023 1438    Comment: Educated on benefits of mobility and being OOB. Educated on safety with mobility, correct supine to sit t/f technique, correct sit<->stand t/f technique, correct gait mechanics, and progression of POC.   Family Acceptance, E, VU,NR by ML at 4/17/2023 1606                               User Key     Initials Effective Dates Name Provider Type Discipline    LR 02/03/23 -  Melissa Tang, PT Physical Therapist PT    AS 02/03/23 -  Nina Benitez, PTA Physical Therapist Assistant PT     04/22/21 -  Dee Duque Physical Therapist PT    BA 09/21/21 -  Wandy George, PT Physical Therapist PT              PT Recommendation and Plan     Plan of Care Reviewed With: patient  Progress: no change  Outcome Evaluation: patient ambulated 400 feet with SBA and rolling walker for support, cues to keep walker close and  to avoiding obstacles in hallway, slightly unsteady gait but no LOB noticed, distance this date limited by weakness. Recommend home with assist and OPPT.     Time Calculation:    PT Charges     Row Name 04/19/23 0938             Time Calculation    Start Time 0853  -AS      PT Received On 04/19/23  -AS      PT Goal Re-Cert Due Date 04/22/23  -AS         Timed Charges    67781 - PT Therapeutic Exercise Minutes 10  -AS      44107 - Gait Training Minutes  13   -AS         Total Minutes    Timed Charges Total Minutes 23  -AS       Total Minutes 23  -AS            User Key  (r) = Recorded By, (t) = Taken By, (c) = Cosigned By    Initials Name Provider Type    AS Nina Benitez PTA Physical Therapist Assistant              Therapy Charges for Today     Code Description Service Date Service Provider Modifiers Qty    33238183196 HC PT THER PROC EA 15 MIN 4/19/2023 Nina Benitez, RASTA GP 1    95667660941 HC GAIT TRAINING EA 15 MIN 4/19/2023 Nina Benitez PTA GP 1          PT G-Codes  Outcome Measure Options: AM-PAC 6 Clicks Basic Mobility (PT)  AM-PAC 6 Clicks Score (PT): 20  AM-PAC 6 Clicks Score (OT): 21       Nina Benitez PTA  4/19/2023

## 2023-04-19 NOTE — CASE MANAGEMENT/SOCIAL WORK
Continued Stay Note  [facility]     Patient Name: Abdiaziz Enrique Jr.  MRN: [id number]  Today's Date: 4/19/2023    Admit Date: 4/11/2023    Plan: update   Discharge Plan     Row Name 04/19/23 0838       Plan    Plan update    Patient/Family in Agreement with Plan yes    Plan Comments Spoke to Kristina with [facility] this a.m. who request RFS form be refaxed to them this morning so they can process.  CM advised that [facility] filled out the form and faxed it but they do not open up until 0900.  Will call and request form be faxed again today to [phone].  CM following.  Anticipated completion of request for [facility] to cover cost of infusions at [facility] today and discharge home with family to transport.    Final Discharge Disposition Code 01 - home or self-care               Discharge Codes    No documentation.               Expected Discharge Date and Time     Expected Discharge Date Expected Discharge Time    Apr 19, 2023             Mia Phan RN

## 2023-04-20 ENCOUNTER — HOSPITAL ENCOUNTER (OUTPATIENT)
Dept: ONCOLOGY | Facility: HOSPITAL | Age: 88
Discharge: HOME | End: 2023-04-20
Payer: OTHER GOVERNMENT

## 2023-04-20 VITALS
WEIGHT: 174 LBS | HEART RATE: 73 BPM | TEMPERATURE: 97.9 F | HEIGHT: 68 IN | RESPIRATION RATE: 16 BRPM | DIASTOLIC BLOOD PRESSURE: 66 MMHG | SYSTOLIC BLOOD PRESSURE: 145 MMHG | BODY MASS INDEX: 26.37 KG/M2

## 2023-04-20 DIAGNOSIS — N39.0 ACUTE UTI: Primary | ICD-10-CM

## 2023-04-20 DIAGNOSIS — R53.1 GENERALIZED WEAKNESS: ICD-10-CM

## 2023-04-20 PROCEDURE — 25010000002 CEFTRIAXONE PER 250 MG: Performed by: INTERNAL MEDICINE

## 2023-04-20 PROCEDURE — 96365 THER/PROPH/DIAG IV INF INIT: CPT

## 2023-04-20 RX ORDER — SODIUM CHLORIDE 0.9 % (FLUSH) 0.9 %
10 SYRINGE (ML) INJECTION AS NEEDED
Status: CANCELLED | OUTPATIENT
Start: 2023-04-20

## 2023-04-20 RX ORDER — SODIUM CHLORIDE 9 MG/ML
250 INJECTION, SOLUTION INTRAVENOUS ONCE
Status: CANCELLED | OUTPATIENT
Start: 2023-04-24

## 2023-04-20 RX ADMIN — CEFTRIAXONE 2 G: 2 INJECTION, POWDER, FOR SOLUTION INTRAMUSCULAR; INTRAVENOUS at 15:01

## 2023-04-20 NOTE — DISCHARGE PLACEMENT REQUEST
"Pankaj Enrique Jr. (87 y.o. Male)   Sergio Moon, RN Case MAnager  [phone]    Date of Birth   1935    Social Security Number       Address   [address] [address]    Home Phone   [phone]    MRN   [id number]       Denominational   None    Marital Status                               Admission Date   23    Admission Type   Emergency    Admitting Provider   Miguel Barnard MD    Attending Provider       Department, Room/Bed   [address], S[address]/1       Discharge Date   2023    Discharge Disposition   Home or Self Care    Discharge Destination                               Attending Provider: (none)   Allergies: No Known Allergies    Isolation: None   Infection: None   Code Status: Prior    Ht: 172.7 cm (68\")   Wt: 81.6 kg (180 lb)    Admission Cmt: None   Principal Problem: Gram-negative bacteremia [R78.81]                 Active Insurance as of 2023     Primary Coverage     Payor Plan Insurance Group Employer/Plan Group    [facility] OPTUM      Payor Plan Address Payor Plan Phone Number Payor Plan Fax Number Effective Dates    [address] [phone]  2022 - None Entered    [facility] [id number]       Subscriber Name Subscriber Birth Date Member ID       PANKAJ ENRIQUE JR. 1935 [id number]                 Emergency Contacts      (Rel.) Home Phone Work Phone Mobile Phone    LINDA CARTER (Daughter) [phone] -- [phone]      [address] [address]  Phone:  [phone]  Fax:  [phone]        Patient: [facility]   Pankaj Enrique Jr. MRN:  [id number]   [address] [id number] :  1935  SSN:    Phone: [phone] Sex:  M   PCP: System, Provider Not In                Emergency Contact Information      Name Relation Home Work Mobile     LINDA CARTER Daughter [phone]     "      INSURANCE PAYOR PLAN GROUP # SUBSCRIBER ID   Primary:    [facility] [id number]   [id number]   Admitting Diagnosis: Generalized weakness [R53.1]  Order Date:  Apr 19, 2023        Case Management  Consult       (Order ID: [id number])     Diagnosis:         Priority:  Routine Expected Date:   Expiration Date:        Interval:   Count:    Comments:  to arrange ceftriaxone 2 g IV daily for 1 week at [facility] outpatient infusion area.  Every Monday CBC/CMP for 2 weeks.  Follow-up with me on April 20 at 9 AM in the clinic  Reason for Consult: discharge planning        Authorizing Provider:Justice Mcmillan MD  Authorizing Provider's NPI: [id number]  Order Entered By: Justice Mcmillan MD 4/19/2023 12:55 PM     Electronically signed by: Justice Mcmillan MD 4/19/2023 12:55 PM

## 2023-04-20 NOTE — OUTREACH NOTE
Prep Survey    Flowsheet Row Responses   [facility] facility patient discharged from? [place]   Is LACE score < 7 ? No   Eligibility Readm Mgmt   Discharge diagnosis Acute UTI   Does the patient have one of the following disease processes/diagnoses(primary or secondary)? Other   Does the patient have Home health ordered? No   Is there a DME ordered? No   Prep survey completed? Yes          Ester KENDRICK - Registered Nurse

## 2023-04-20 NOTE — OUTREACH NOTE
Prep Survey    Flowsheet Row Responses   [facility] facility patient discharged from? [place]   Is LACE score < 7 ? No   Eligibility Matt Norman S - Registered Nurse

## 2023-04-21 ENCOUNTER — HOSPITAL ENCOUNTER (OUTPATIENT)
Dept: ONCOLOGY | Facility: HOSPITAL | Age: 88
Discharge: HOME | End: 2023-04-21

## 2023-04-21 VITALS
BODY MASS INDEX: 26.67 KG/M2 | TEMPERATURE: 97.4 F | DIASTOLIC BLOOD PRESSURE: 60 MMHG | RESPIRATION RATE: 20 BRPM | HEIGHT: 68 IN | SYSTOLIC BLOOD PRESSURE: 130 MMHG | WEIGHT: 176 LBS | HEART RATE: 69 BPM

## 2023-04-21 DIAGNOSIS — R53.1 GENERALIZED WEAKNESS: Primary | ICD-10-CM

## 2023-04-21 PROCEDURE — 96365 THER/PROPH/DIAG IV INF INIT: CPT

## 2023-04-21 PROCEDURE — 25010000002 CEFTRIAXONE PER 250 MG: Performed by: INTERNAL MEDICINE

## 2023-04-21 RX ORDER — SODIUM CHLORIDE 9 MG/ML
250 INJECTION, SOLUTION INTRAVENOUS ONCE
Status: DISCONTINUED | OUTPATIENT
Start: 2023-04-21 | End: 2023-04-22 | Stop reason: HOSPADM

## 2023-04-21 RX ORDER — SODIUM CHLORIDE 9 MG/ML
250 INJECTION, SOLUTION INTRAVENOUS ONCE
Status: CANCELLED | OUTPATIENT
Start: 2023-04-24

## 2023-04-21 RX ADMIN — CEFTRIAXONE 2 G: 2 INJECTION, POWDER, FOR SOLUTION INTRAMUSCULAR; INTRAVENOUS at 13:54

## 2023-04-22 ENCOUNTER — HOSPITAL ENCOUNTER (OUTPATIENT)
Dept: ONCOLOGY | Facility: HOSPITAL | Age: 88
Discharge: HOME | End: 2023-04-22

## 2023-04-22 VITALS
SYSTOLIC BLOOD PRESSURE: 146 MMHG | HEART RATE: 60 BPM | DIASTOLIC BLOOD PRESSURE: 50 MMHG | TEMPERATURE: 97.2 F | RESPIRATION RATE: 16 BRPM

## 2023-04-22 DIAGNOSIS — R53.1 GENERALIZED WEAKNESS: Primary | ICD-10-CM

## 2023-04-22 PROCEDURE — 25010000002 CEFTRIAXONE PER 250 MG: Performed by: INTERNAL MEDICINE

## 2023-04-22 PROCEDURE — 96365 THER/PROPH/DIAG IV INF INIT: CPT

## 2023-04-22 RX ORDER — SODIUM CHLORIDE 9 MG/ML
250 INJECTION, SOLUTION INTRAVENOUS ONCE
Status: CANCELLED | OUTPATIENT
Start: 2023-04-24

## 2023-04-22 RX ORDER — SODIUM CHLORIDE 9 MG/ML
250 INJECTION, SOLUTION INTRAVENOUS ONCE
Status: DISCONTINUED | OUTPATIENT
Start: 2023-04-22 | End: 2023-04-23 | Stop reason: HOSPADM

## 2023-04-22 RX ADMIN — CEFTRIAXONE 2 G: 2 INJECTION, POWDER, FOR SOLUTION INTRAMUSCULAR; INTRAVENOUS at 09:28

## 2023-04-23 ENCOUNTER — HOSPITAL ENCOUNTER (OUTPATIENT)
Dept: ONCOLOGY | Facility: HOSPITAL | Age: 88
Discharge: HOME | End: 2023-04-23

## 2023-04-23 VITALS
DIASTOLIC BLOOD PRESSURE: 48 MMHG | HEART RATE: 63 BPM | TEMPERATURE: 97 F | SYSTOLIC BLOOD PRESSURE: 145 MMHG | RESPIRATION RATE: 19 BRPM

## 2023-04-23 DIAGNOSIS — R53.1 GENERALIZED WEAKNESS: Primary | ICD-10-CM

## 2023-04-23 PROCEDURE — 96365 THER/PROPH/DIAG IV INF INIT: CPT

## 2023-04-23 PROCEDURE — 25010000002 CEFTRIAXONE PER 250 MG: Performed by: INTERNAL MEDICINE

## 2023-04-23 RX ORDER — SODIUM CHLORIDE 9 MG/ML
250 INJECTION, SOLUTION INTRAVENOUS ONCE
Status: DISCONTINUED | OUTPATIENT
Start: 2023-04-23 | End: 2023-04-24 | Stop reason: HOSPADM

## 2023-04-23 RX ORDER — SODIUM CHLORIDE 9 MG/ML
250 INJECTION, SOLUTION INTRAVENOUS ONCE
Status: CANCELLED | OUTPATIENT
Start: 2023-04-24

## 2023-04-23 RX ADMIN — CEFTRIAXONE 2 G: 2 INJECTION, POWDER, FOR SOLUTION INTRAMUSCULAR; INTRAVENOUS at 09:27

## 2023-04-24 ENCOUNTER — HOSPITAL ENCOUNTER (OUTPATIENT)
Dept: ONCOLOGY | Facility: HOSPITAL | Age: 88
Discharge: HOME | End: 2023-04-24
Admitting: INTERNAL MEDICINE
Payer: OTHER GOVERNMENT

## 2023-04-24 VITALS
HEART RATE: 65 BPM | DIASTOLIC BLOOD PRESSURE: 63 MMHG | SYSTOLIC BLOOD PRESSURE: 146 MMHG | BODY MASS INDEX: 26.67 KG/M2 | HEIGHT: 68 IN | WEIGHT: 176 LBS | TEMPERATURE: 96.9 F | RESPIRATION RATE: 18 BRPM

## 2023-04-24 DIAGNOSIS — R53.1 GENERALIZED WEAKNESS: Primary | ICD-10-CM

## 2023-04-24 LAB
ALBUMIN SERPL-MCNC: 3.3 G/DL (ref 3.5–5.2)
ALBUMIN/GLOB SERPL: 1.1 G/DL
ALP SERPL-CCNC: 158 U/L (ref 39–117)
ALT SERPL W P-5'-P-CCNC: 20 U/L (ref 1–41)
ANION GAP SERPL CALCULATED.3IONS-SCNC: 10 MMOL/L (ref 5–15)
AST SERPL-CCNC: 27 U/L (ref 1–40)
BILIRUB SERPL-MCNC: 0.2 MG/DL (ref 0–1.2)
BUN SERPL-MCNC: 18 MG/DL (ref 8–23)
BUN/CREAT SERPL: 14.1 (ref 7–25)
CALCIUM SPEC-SCNC: 8.8 MG/DL (ref 8.6–10.5)
CHLORIDE SERPL-SCNC: 109 MMOL/L (ref 98–107)
CO2 SERPL-SCNC: 24 MMOL/L (ref 22–29)
CREAT SERPL-MCNC: 1.28 MG/DL (ref 0.76–1.27)
DEPRECATED RDW RBC AUTO: 47.3 FL (ref 37–54)
EGFRCR SERPLBLD CKD-EPI 2021: 54.2 ML/MIN/1.73
ERYTHROCYTE [DISTWIDTH] IN BLOOD BY AUTOMATED COUNT: 13.1 % (ref 12.3–15.4)
GLOBULIN UR ELPH-MCNC: 3 GM/DL
GLUCOSE SERPL-MCNC: 119 MG/DL (ref 65–99)
HCT VFR BLD AUTO: 36.6 % (ref 37.5–51)
HGB BLD-MCNC: 11.9 G/DL (ref 13–17.7)
MCH RBC QN AUTO: 31.6 PG (ref 26.6–33)
MCHC RBC AUTO-ENTMCNC: 32.5 G/DL (ref 31.5–35.7)
MCV RBC AUTO: 97.1 FL (ref 79–97)
PLATELET # BLD AUTO: 360 10*3/MM3 (ref 140–450)
PMV BLD AUTO: 9.6 FL (ref 6–12)
POTASSIUM SERPL-SCNC: 4.2 MMOL/L (ref 3.5–5.2)
PROT SERPL-MCNC: 6.3 G/DL (ref 6–8.5)
RBC # BLD AUTO: 3.77 10*6/MM3 (ref 4.14–5.8)
SODIUM SERPL-SCNC: 143 MMOL/L (ref 136–145)
WBC NRBC COR # BLD: 7.44 10*3/MM3 (ref 3.4–10.8)

## 2023-04-24 PROCEDURE — 85027 COMPLETE CBC AUTOMATED: CPT | Performed by: INTERNAL MEDICINE

## 2023-04-24 PROCEDURE — 96365 THER/PROPH/DIAG IV INF INIT: CPT

## 2023-04-24 PROCEDURE — 80053 COMPREHEN METABOLIC PANEL: CPT | Performed by: INTERNAL MEDICINE

## 2023-04-24 PROCEDURE — 25010000002 CEFTRIAXONE PER 250 MG: Performed by: INTERNAL MEDICINE

## 2023-04-24 RX ORDER — SODIUM CHLORIDE 9 MG/ML
250 INJECTION, SOLUTION INTRAVENOUS ONCE
Status: CANCELLED | OUTPATIENT
Start: 2023-05-01

## 2023-04-24 RX ORDER — SODIUM CHLORIDE 9 MG/ML
250 INJECTION, SOLUTION INTRAVENOUS ONCE
Status: DISCONTINUED | OUTPATIENT
Start: 2023-04-24 | End: 2023-04-25 | Stop reason: HOSPADM

## 2023-04-24 RX ADMIN — CEFTRIAXONE 2 G: 2 INJECTION, POWDER, FOR SOLUTION INTRAMUSCULAR; INTRAVENOUS at 14:49

## 2023-04-25 ENCOUNTER — HOSPITAL ENCOUNTER (OUTPATIENT)
Dept: ONCOLOGY | Facility: HOSPITAL | Age: 88
Discharge: HOME | End: 2023-04-25
Admitting: INTERNAL MEDICINE
Payer: OTHER GOVERNMENT

## 2023-04-25 VITALS
TEMPERATURE: 96.8 F | BODY MASS INDEX: 26.83 KG/M2 | DIASTOLIC BLOOD PRESSURE: 67 MMHG | SYSTOLIC BLOOD PRESSURE: 169 MMHG | RESPIRATION RATE: 18 BRPM | WEIGHT: 177 LBS | HEART RATE: 68 BPM | HEIGHT: 68 IN

## 2023-04-25 DIAGNOSIS — R53.1 GENERALIZED WEAKNESS: Primary | ICD-10-CM

## 2023-04-25 PROCEDURE — 96365 THER/PROPH/DIAG IV INF INIT: CPT

## 2023-04-25 PROCEDURE — 25010000002 CEFTRIAXONE PER 250 MG: Performed by: INTERNAL MEDICINE

## 2023-04-25 RX ORDER — SODIUM CHLORIDE 9 MG/ML
250 INJECTION, SOLUTION INTRAVENOUS ONCE
Status: CANCELLED | OUTPATIENT
Start: 2023-05-01

## 2023-04-25 RX ADMIN — CEFTRIAXONE 2 G: 2 INJECTION, POWDER, FOR SOLUTION INTRAMUSCULAR; INTRAVENOUS at 07:55

## 2023-04-26 ENCOUNTER — HOSPITAL ENCOUNTER (OUTPATIENT)
Dept: ONCOLOGY | Facility: HOSPITAL | Age: 88
Discharge: HOME | End: 2023-04-26
Admitting: INTERNAL MEDICINE
Payer: OTHER GOVERNMENT

## 2023-04-26 VITALS
TEMPERATURE: 97.7 F | SYSTOLIC BLOOD PRESSURE: 162 MMHG | WEIGHT: 176 LBS | HEART RATE: 68 BPM | BODY MASS INDEX: 26.67 KG/M2 | HEIGHT: 68 IN | RESPIRATION RATE: 16 BRPM | DIASTOLIC BLOOD PRESSURE: 66 MMHG

## 2023-04-26 DIAGNOSIS — R53.1 GENERALIZED WEAKNESS: Primary | ICD-10-CM

## 2023-04-26 PROCEDURE — 25010000002 CEFTRIAXONE PER 250 MG: Performed by: INTERNAL MEDICINE

## 2023-04-26 PROCEDURE — 96365 THER/PROPH/DIAG IV INF INIT: CPT

## 2023-04-26 RX ORDER — SODIUM CHLORIDE 9 MG/ML
250 INJECTION, SOLUTION INTRAVENOUS ONCE
OUTPATIENT
Start: 2023-05-01

## 2023-04-26 RX ORDER — SODIUM CHLORIDE 9 MG/ML
250 INJECTION, SOLUTION INTRAVENOUS ONCE
Status: DISCONTINUED | OUTPATIENT
Start: 2023-04-26 | End: 2023-04-27 | Stop reason: HOSPADM

## 2023-04-26 RX ADMIN — CEFTRIAXONE 2 G: 2 INJECTION, POWDER, FOR SOLUTION INTRAMUSCULAR; INTRAVENOUS at 15:46

## 2023-04-28 ENCOUNTER — READMISSION MANAGEMENT (OUTPATIENT)
Dept: CALL CENTER | Facility: HOSPITAL | Age: 88
End: 2023-04-28
Payer: OTHER GOVERNMENT

## 2023-04-28 NOTE — OUTREACH NOTE
Medical Week 2 Survey    Flowsheet Row Responses   [facility] patient discharged from? [place]   Does the patient have one of the following disease processes/diagnoses(primary or secondary)? Other   Week 2 attempt successful? Yes   Call start time 1046   Discharge diagnosis Acute UTI   Call end time 1051   Is patient permission given to speak with other caregiver? Yes   List who call center can speak with Lizbet daughter    Person spoke with today (if not patient) and relationship Lizbet daughter    Meds reviewed with patient/caregiver? Yes   Is the patient taking all medications as directed (includes completed medication regime)? Yes   Does the patient have a primary care provider?  Yes  [[facility] ]   Has the patient kept scheduled appointments due by today? N/A   Comments Working on getting approval for bladder stone removal (insurance pending) through the [facility]    Has home health visited the patient within 72 hours of discharge? N/A   Did the patient receive a copy of their discharge instructions? Yes   Nursing interventions Reviewed instructions with patient   What is the patient's perception of their health status since discharge? Improving   Is the patient/caregiver able to teach back signs and symptoms related to disease process for when to call PCP? Yes   Is the patient/caregiver able to teach back signs and symptoms related to disease process for when to call 911? Yes   Is the patient/caregiver able to teach back the hierarchy of who to call/visit for symptoms/problems? PCP, Specialist, Home health nurse, Urgent Care, ED, 911 Yes   If the patient is a current smoker, are they able to teach back resources for cessation? Not a smoker   Week 2 Call Completed? Yes   Graduated Yes   Did the patient feel the follow up calls were helpful during their recovery period? Yes   Graduated/Revoked comments Doing better per av BRIONES - Registered Nurse

## 2023-05-02 ENCOUNTER — ANESTHESIA EVENT (OUTPATIENT)
Dept: PERIOP | Facility: HOSPITAL | Age: 88
End: 2023-05-02
Payer: OTHER GOVERNMENT

## 2023-05-02 RX ORDER — SODIUM CHLORIDE 0.9 % (FLUSH) 0.9 %
10 SYRINGE (ML) INJECTION EVERY 12 HOURS SCHEDULED
Status: CANCELLED | OUTPATIENT
Start: 2023-05-02

## 2023-05-02 RX ORDER — SODIUM CHLORIDE 9 MG/ML
40 INJECTION, SOLUTION INTRAVENOUS AS NEEDED
Status: CANCELLED | OUTPATIENT
Start: 2023-05-02

## 2023-05-02 RX ORDER — FAMOTIDINE 10 MG/ML
20 INJECTION, SOLUTION INTRAVENOUS ONCE
Status: CANCELLED | OUTPATIENT
Start: 2023-05-02 | End: 2023-05-02

## 2023-05-02 RX ORDER — SODIUM CHLORIDE 0.9 % (FLUSH) 0.9 %
10 SYRINGE (ML) INJECTION AS NEEDED
Status: CANCELLED | OUTPATIENT
Start: 2023-05-02

## 2023-05-03 ENCOUNTER — HOSPITAL ENCOUNTER (OUTPATIENT)
Facility: HOSPITAL | Age: 88
Setting detail: HOSPITAL OUTPATIENT SURGERY
Discharge: HOME | End: 2023-05-03
Attending: UROLOGY | Admitting: UROLOGY
Payer: OTHER GOVERNMENT

## 2023-05-03 ENCOUNTER — ANESTHESIA (OUTPATIENT)
Dept: PERIOP | Facility: HOSPITAL | Age: 88
End: 2023-05-03
Payer: OTHER GOVERNMENT

## 2023-05-03 VITALS
TEMPERATURE: 97.6 F | OXYGEN SATURATION: 93 % | RESPIRATION RATE: 20 BRPM | HEART RATE: 79 BPM | WEIGHT: 176 LBS | SYSTOLIC BLOOD PRESSURE: 151 MMHG | DIASTOLIC BLOOD PRESSURE: 73 MMHG | HEIGHT: 68 IN | BODY MASS INDEX: 26.67 KG/M2

## 2023-05-03 DIAGNOSIS — N39.0 ACUTE UTI: Primary | ICD-10-CM

## 2023-05-03 DIAGNOSIS — N21.0 BLADDER STONES: ICD-10-CM

## 2023-05-03 LAB — GLUCOSE BLDC GLUCOMTR-MCNC: 110 MG/DL (ref 70–130)

## 2023-05-03 PROCEDURE — 25010000002 CEFAZOLIN IN DEXTROSE 2-4 GM/100ML-% SOLUTION

## 2023-05-03 PROCEDURE — 25010000002 ONDANSETRON PER 1 MG

## 2023-05-03 PROCEDURE — 25010000002 PROPOFOL 10 MG/ML EMULSION

## 2023-05-03 PROCEDURE — 25010000002 FENTANYL CITRATE (PF) 100 MCG/2ML SOLUTION

## 2023-05-03 PROCEDURE — 82948 REAGENT STRIP/BLOOD GLUCOSE: CPT

## 2023-05-03 PROCEDURE — 25010000002 DEXAMETHASONE PER 1 MG

## 2023-05-03 PROCEDURE — 82365 CALCULUS SPECTROSCOPY: CPT | Performed by: UROLOGY

## 2023-05-03 RX ORDER — SODIUM CHLORIDE 0.9 % (FLUSH) 0.9 %
3-10 SYRINGE (ML) INJECTION AS NEEDED
Status: DISCONTINUED | OUTPATIENT
Start: 2023-05-03 | End: 2023-05-03 | Stop reason: HOSPADM

## 2023-05-03 RX ORDER — PROPOFOL 10 MG/ML
VIAL (ML) INTRAVENOUS AS NEEDED
Status: DISCONTINUED | OUTPATIENT
Start: 2023-05-03 | End: 2023-05-03 | Stop reason: SURG

## 2023-05-03 RX ORDER — DOXYCYCLINE HYCLATE 100 MG/1
100 CAPSULE ORAL 2 TIMES DAILY
Qty: 14 CAPSULE | Refills: 0 | Status: SHIPPED | OUTPATIENT
Start: 2023-05-03 | End: 2023-05-10

## 2023-05-03 RX ORDER — FENTANYL CITRATE 50 UG/ML
25 INJECTION, SOLUTION INTRAMUSCULAR; INTRAVENOUS
Status: DISCONTINUED | OUTPATIENT
Start: 2023-05-03 | End: 2023-05-03 | Stop reason: HOSPADM

## 2023-05-03 RX ORDER — FENTANYL CITRATE 50 UG/ML
INJECTION, SOLUTION INTRAMUSCULAR; INTRAVENOUS AS NEEDED
Status: DISCONTINUED | OUTPATIENT
Start: 2023-05-03 | End: 2023-05-03 | Stop reason: SURG

## 2023-05-03 RX ORDER — HYDROCODONE BITARTRATE AND ACETAMINOPHEN 5; 325 MG/1; MG/1
1 TABLET ORAL ONCE AS NEEDED
Status: DISCONTINUED | OUTPATIENT
Start: 2023-05-03 | End: 2023-05-03 | Stop reason: HOSPADM

## 2023-05-03 RX ORDER — MIDAZOLAM HYDROCHLORIDE 1 MG/ML
0.5 INJECTION INTRAMUSCULAR; INTRAVENOUS
Status: DISCONTINUED | OUTPATIENT
Start: 2023-05-03 | End: 2023-05-03 | Stop reason: HOSPADM

## 2023-05-03 RX ORDER — FLUNISOLIDE 0.25 MG/ML
2 SOLUTION NASAL
Status: ON HOLD | COMMUNITY
End: 2023-05-03

## 2023-05-03 RX ORDER — LIDOCAINE HYDROCHLORIDE 10 MG/ML
0.5 INJECTION, SOLUTION EPIDURAL; INFILTRATION; INTRACAUDAL; PERINEURAL ONCE AS NEEDED
Status: COMPLETED | OUTPATIENT
Start: 2023-05-03 | End: 2023-05-03

## 2023-05-03 RX ORDER — HYDRALAZINE HYDROCHLORIDE 20 MG/ML
5 INJECTION INTRAMUSCULAR; INTRAVENOUS
Status: DISCONTINUED | OUTPATIENT
Start: 2023-05-03 | End: 2023-05-03 | Stop reason: HOSPADM

## 2023-05-03 RX ORDER — FAMOTIDINE 20 MG/1
20 TABLET, FILM COATED ORAL ONCE
Status: COMPLETED | OUTPATIENT
Start: 2023-05-03 | End: 2023-05-03

## 2023-05-03 RX ORDER — ONDANSETRON 2 MG/ML
4 INJECTION INTRAMUSCULAR; INTRAVENOUS ONCE AS NEEDED
Status: DISCONTINUED | OUTPATIENT
Start: 2023-05-03 | End: 2023-05-03 | Stop reason: HOSPADM

## 2023-05-03 RX ORDER — EPHEDRINE SULFATE 50 MG/ML
INJECTION INTRAVENOUS AS NEEDED
Status: DISCONTINUED | OUTPATIENT
Start: 2023-05-03 | End: 2023-05-03 | Stop reason: SURG

## 2023-05-03 RX ORDER — LIDOCAINE HYDROCHLORIDE 10 MG/ML
INJECTION, SOLUTION EPIDURAL; INFILTRATION; INTRACAUDAL; PERINEURAL AS NEEDED
Status: DISCONTINUED | OUTPATIENT
Start: 2023-05-03 | End: 2023-05-03 | Stop reason: SURG

## 2023-05-03 RX ORDER — MAGNESIUM HYDROXIDE 1200 MG/15ML
LIQUID ORAL AS NEEDED
Status: DISCONTINUED | OUTPATIENT
Start: 2023-05-03 | End: 2023-05-03 | Stop reason: HOSPADM

## 2023-05-03 RX ORDER — ONDANSETRON 2 MG/ML
INJECTION INTRAMUSCULAR; INTRAVENOUS AS NEEDED
Status: DISCONTINUED | OUTPATIENT
Start: 2023-05-03 | End: 2023-05-03 | Stop reason: SURG

## 2023-05-03 RX ORDER — LIDOCAINE HYDROCHLORIDE 20 MG/ML
JELLY TOPICAL AS NEEDED
Status: DISCONTINUED | OUTPATIENT
Start: 2023-05-03 | End: 2023-05-03 | Stop reason: HOSPADM

## 2023-05-03 RX ORDER — SODIUM CHLORIDE 0.9 % (FLUSH) 0.9 %
3 SYRINGE (ML) INJECTION EVERY 12 HOURS SCHEDULED
Status: DISCONTINUED | OUTPATIENT
Start: 2023-05-03 | End: 2023-05-03 | Stop reason: HOSPADM

## 2023-05-03 RX ORDER — LABETALOL HYDROCHLORIDE 5 MG/ML
5 INJECTION, SOLUTION INTRAVENOUS
Status: DISCONTINUED | OUTPATIENT
Start: 2023-05-03 | End: 2023-05-03 | Stop reason: HOSPADM

## 2023-05-03 RX ORDER — DEXAMETHASONE SODIUM PHOSPHATE 4 MG/ML
INJECTION, SOLUTION INTRA-ARTICULAR; INTRALESIONAL; INTRAMUSCULAR; INTRAVENOUS; SOFT TISSUE AS NEEDED
Status: DISCONTINUED | OUTPATIENT
Start: 2023-05-03 | End: 2023-05-03 | Stop reason: SURG

## 2023-05-03 RX ORDER — SODIUM CHLORIDE, SODIUM LACTATE, POTASSIUM CHLORIDE, CALCIUM CHLORIDE 600; 310; 30; 20 MG/100ML; MG/100ML; MG/100ML; MG/100ML
9 INJECTION, SOLUTION INTRAVENOUS CONTINUOUS
Status: DISCONTINUED | OUTPATIENT
Start: 2023-05-03 | End: 2023-05-03 | Stop reason: HOSPADM

## 2023-05-03 RX ORDER — CEFAZOLIN SODIUM 2 G/100ML
INJECTION, SOLUTION INTRAVENOUS AS NEEDED
Status: DISCONTINUED | OUTPATIENT
Start: 2023-05-03 | End: 2023-05-03 | Stop reason: SURG

## 2023-05-03 RX ORDER — IPRATROPIUM BROMIDE AND ALBUTEROL SULFATE 2.5; .5 MG/3ML; MG/3ML
3 SOLUTION RESPIRATORY (INHALATION) ONCE AS NEEDED
Status: DISCONTINUED | OUTPATIENT
Start: 2023-05-03 | End: 2023-05-03 | Stop reason: HOSPADM

## 2023-05-03 RX ORDER — SODIUM CHLORIDE 9 MG/ML
40 INJECTION, SOLUTION INTRAVENOUS AS NEEDED
Status: DISCONTINUED | OUTPATIENT
Start: 2023-05-03 | End: 2023-05-03 | Stop reason: HOSPADM

## 2023-05-03 RX ADMIN — PROPOFOL 50 MG: 10 INJECTION, EMULSION INTRAVENOUS at 10:59

## 2023-05-03 RX ADMIN — LIDOCAINE HYDROCHLORIDE 50 MG: 10 INJECTION, SOLUTION EPIDURAL; INFILTRATION; INTRACAUDAL; PERINEURAL at 10:58

## 2023-05-03 RX ADMIN — DEXAMETHASONE SODIUM PHOSPHATE 4 MG: 4 INJECTION, SOLUTION INTRAMUSCULAR; INTRAVENOUS at 11:01

## 2023-05-03 RX ADMIN — FENTANYL CITRATE 100 MCG: 50 INJECTION, SOLUTION INTRAMUSCULAR; INTRAVENOUS at 11:01

## 2023-05-03 RX ADMIN — FAMOTIDINE 20 MG: 20 TABLET ORAL at 09:34

## 2023-05-03 RX ADMIN — PROPOFOL 100 MG: 10 INJECTION, EMULSION INTRAVENOUS at 10:58

## 2023-05-03 RX ADMIN — EPHEDRINE SULFATE 10 MG: 50 INJECTION INTRAVENOUS at 11:16

## 2023-05-03 RX ADMIN — LIDOCAINE HYDROCHLORIDE 0.5 ML: 10 INJECTION, SOLUTION EPIDURAL; INFILTRATION; INTRACAUDAL; PERINEURAL at 09:25

## 2023-05-03 RX ADMIN — CEFAZOLIN SODIUM 2 G: 2 INJECTION, SOLUTION INTRAVENOUS at 11:02

## 2023-05-03 RX ADMIN — SODIUM CHLORIDE, POTASSIUM CHLORIDE, SODIUM LACTATE AND CALCIUM CHLORIDE 9 ML/HR: 600; 310; 30; 20 INJECTION, SOLUTION INTRAVENOUS at 09:34

## 2023-05-03 RX ADMIN — ONDANSETRON 4 MG: 2 INJECTION INTRAMUSCULAR; INTRAVENOUS at 11:38

## 2023-05-03 NOTE — OP NOTE
CYSTOSCOPY LITHOLAPAXY BLADDER STONE EXTRACTION  Procedure Report    Patient Name:  Abdiaziz Enrique Jr.  YOB: 1935    Date of Surgery:  5/3/2023     Indications: Large bladder calculus    Pre-op Diagnosis:   Large bladder calculus       Post-Op Diagnosis Codes:  Large bladder calculus    Procedure/CPT® Codes:      Procedure(s):  CYSTOLITHOLAPAXY WITH LASER        Staff:  Surgeon(s):  Ricardo Schofield MD         Anesthesia: General    Estimated Blood Loss: 0    Implants:    Nothing was implanted during the procedure    Specimen:          Specimens     ID Source Type Tests Collected By Collected At Frozen?    1 Urinary Bladder Calculus · STONE ANALYSIS   Ricardo Schofield MD 5/3/23 2795     Description: BLADDER STONES FOR ANALYSIS     This specimen was not marked as sent.              Findings: Large bladder calculus with 6 mm stone within the left ureter orifice within a Hutch diverticulum extracted    Complications: None    Description of Procedure: After satisfactory general esthesia patient was placed in lithotomy position.  Genitals prepped and draped in normal fashion.  22 German cystoscopy sheath introduced.  Bladder is unremarkable.  Prostate showed trilobar hypertrophy.  He had a moderate median lobe.  Upon in the bladder bladder inspected and there is a large stellate stone.  Also at the left ureteral orifice there was a stone present.  The ureteral orifice appeared to be within a Hutch diverticulum was widely patent.  The 940 µm laser fiber was used to fragment the stone down to tiny material this was evacuated out with the Urovac with several cycles.  Bladder was completely cleared the stone within the ureteral orifice was grasped with alligator forceps and removed.  It was fragmented further and was evacuated also.  All stone material was removed from the lower urinary tract.  Scope was slowly withdrawn.  Xylocaine gel instilled urethra.  Stricture postop care in stable  condition.  Stones will be sent for analysis.                      Ricardo Schofield MD     Date: 5/3/2023  Time: 12:01 EDT

## 2023-05-03 NOTE — ANESTHESIA PREPROCEDURE EVALUATION
Anesthesia Evaluation     Patient summary reviewed and Nursing notes reviewed   no history of anesthetic complications:  NPO Solid Status: > 8 hours  NPO Liquid Status: > 2 hours           Airway   Mallampati: II  TM distance: >3 FB  Neck ROM: full  No difficulty expected  Dental    (+) poor dentition    Pulmonary - normal exam   (+) a smoker Former,   Cardiovascular - normal exam    ECG reviewed    (+) hypertension, dysrhythmias PAC,       Neuro/Psych- negative ROS  GI/Hepatic/Renal/Endo    (+)   liver disease history of elevated LFT, renal disease stones and CRI, diabetes mellitus,   (-) GERD, no thyroid disorder    Musculoskeletal     Abdominal    Substance History      OB/GYN          Other                      Anesthesia Plan    ASA 3     general     intravenous induction     Anesthetic plan, risks, benefits, and alternatives have been provided, discussed and informed consent has been obtained with: patient.    Plan discussed with CRNA.        CODE STATUS:

## 2023-05-03 NOTE — ANESTHESIA PROCEDURE NOTES
Airway  Urgency: elective    Date/Time: 5/3/2023 10:59 AM  Airway not difficult    General Information and Staff    Patient location during procedure: OR  CRNA/CAA: Ho Sin CRNA    Indications and Patient Condition  Indications for airway management: airway protection    Preoxygenated: yes  Mask difficulty assessment: 0 - not attempted    Final Airway Details  Final airway type: supraglottic airway      Successful airway: I-gel  Size 4     Number of attempts at approach: 1  Assessment: lips, teeth, and gum same as pre-op    Additional Comments  LMA placed without difficulty, ventilation with assist, equal breath sounds and symmetric chest rise and fall

## 2023-05-03 NOTE — H&P
Pre-Op H&P  Abdiaziz Enrique Jr.  [id number]  1935      Chief complaint: Irritation during urination      Subjective:  Patient is a 87 y.o.male presents for scheduled surgery by Dr. Schofield. He anticipates a CYSTOLITHOLAPAXY WITH LASER today.  The patient stated that he has had irritation and burning during urination for approximately the past 18 months.  He denied any other associated symptoms such as painful urination or hematuria.  He stated that Flomax helps to alleviate his symptoms.       Review of Systems:  Constitutional-- No fever, chills or sweats. No fatigue.  CV-- No chest pain, palpitation or syncope. +HTN  Resp-- No SOB, cough, hemoptysis  Skin--No rashes or lesions      Allergies: No Known Allergies      Home Meds:  Medications Prior to Admission   Medication Sig Dispense Refill Last Dose   • amLODIPine (NORVASC) 5 MG tablet Take 1 tablet by mouth Daily.      • colchicine 0.6 MG tablet Take 1 tablet by mouth.      • finasteride (PROSCAR) 5 MG tablet Take 1 tablet by mouth Daily.      • flunisolide (NASALIDE) 25 MCG/ACT (0.025%) solution nasal spray Inhale 2 sprays.      • fluticasone (FLONASE) 50 MCG/ACT nasal spray 2 sprays into the nostril(s) as directed by provider Daily.      • losartan (COZAAR) 50 MG tablet Take 2 tablets by mouth Daily.      • metFORMIN (GLUCOPHAGE) 500 MG tablet Take 1 tablet by mouth 2 (Two) Times a Day With Meals.      • pantoprazole (Protonix) 40 MG EC tablet Take 1 tablet by mouth 2 (Two) Times a Day for 30 days. 60 tablet 0    • tamsulosin (FLOMAX) 0.4 MG capsule 24 hr capsule Take 1 capsule by mouth Every Night.            PMH:   Past Medical History:   Diagnosis Date   • Bladder stone    • BPH (benign prostatic hyperplasia) 04/11/2023   • Gout 04/11/2023   • Hypertension 04/11/2023     PSH:    Past Surgical History:   Procedure Laterality Date   • CYSTOSCOPY     • CYSTOSCOPY W/ URETERAL STENT PLACEMENT     • ENDOSCOPY N/A 4/15/2023    Procedure:  "ESOPHAGOGASTRODUODENOSCOPY;  Surgeon: Brunner, Mark I, MD;  Location: [facility] ENDOSCOPY;  Service: Gastroenterology;  Laterality: N/A;   • TONSILLECTOMY         Immunization History:  Influenza: 2022  Pneumococcal: No  Tetanus: No  Covid : 4x    Social History:   Tobacco:   Social History     Tobacco Use   Smoking Status Former   • Packs/day: 1.00   • Years: 30.00   • Pack years: 30.00   • Types: Cigarettes   Smokeless Tobacco Not on file      Alcohol:     Social History     Substance and Sexual Activity   Alcohol Use None         Physical Exam:/81 (BP Location: Right arm, Patient Position: Lying)   Pulse 74   Temp 97.5 °F (36.4 °C) (Temporal)   Resp 16   Ht 172.7 cm (68\")   Wt 79.8 kg (176 lb)   SpO2 98%   BMI 26.76 kg/m²       General Appearance:    Alert, cooperative, no distress, appears stated age   Head:    Normocephalic, without obvious abnormality, atraumatic   Lungs:     Clear to auscultation bilaterally, respirations unlabored    Heart:   Regular rate and rhythm, S1 and S2 normal    Abdomen:    Soft without tenderness   Extremities:   Extremities normal, atraumatic, no cyanosis or edema   Skin:   Skin color, texture, turgor normal, no rashes or lesions   Neurologic:   Grossly intact     Results Review:     LABS:  Lab Results   Component Value Date    WBC 7.44 04/24/2023    HGB 11.9 (L) 04/24/2023    HCT 36.6 (L) 04/24/2023    MCV 97.1 (H) 04/24/2023     04/24/2023    NEUTROABS 11.17 (H) 04/13/2023    GLUCOSE 119 (H) 04/24/2023    BUN 18 04/24/2023    CREATININE 1.28 (H) 04/24/2023     04/24/2023    K 4.2 04/24/2023     (H) 04/24/2023    CO2 24.0 04/24/2023    MG 2.3 04/11/2023    CALCIUM 8.8 04/24/2023    ALBUMIN 3.3 (L) 04/24/2023    AST 27 04/24/2023    ALT 20 04/24/2023    BILITOT 0.2 04/24/2023       RADIOLOGY:  Study Result    Narrative & Impression   CT ABDOMEN PELVIS WO CONTRAST     Date of Exam: 4/11/2023 10:34 PM EDT     Indication: new UTI in male , microscopic " hematuria.     Comparison: None available.     Findings:  The lung bases are clear. Evaluation of the body wall soft tissues demonstrates no acute or suspicious findings. There is advanced multilevel spondylosis and lumbar dextrocurvature. The liver is homogeneous. Cholelithiasis is present with the gallbladder   otherwise nondistended. Homogeneous spleen. No suspicious focal pancreatic lesion. There is extensive bilateral nephrolithiasis, without evidence of obstruction on the right. On the left, there is moderate to severe hydronephrosis and a rounded 6 mm   stone is present within the mid portion of the left ureter. A large stone is noted in the bladder. There is severe wall edema with adjacent mesenteric stranding present involving the transverse portion of the duodenum, incompletely characterized given   lack of IV contrast, with appearance concerning for an adjacent inflamed large duodenal diverticulum, with component of small adjacent abscess suspected, measuring approximately 3 cm on image 56 of the axial series. There is no evidence of associated   bowel obstruction. There is no overt pneumoperitoneum. Atherosclerotic abdominal aorta. The pelvic viscera demonstrate no additional acute findings.     IMPRESSION:  Impression:  Bilateral nephrolithiasis is present, with an obstructing stone present in the midportion of the left ureter, with associated left-sided hydronephrosis and nephropathy.       I reviewed the patient's new clinical results.      Impression: Ureter Stone      Plan: CYSTOLITHOLAPAXY WITH LASER      Adalberto Bustamante, CELE   5/3/2023   09:12 EDT

## 2023-05-03 NOTE — ANESTHESIA POSTPROCEDURE EVALUATION
Patient: Abdiaziz Enrique Jr.    Procedure Summary     Date: 05/03/23 Room / Location:  [facility]    Anesthesia Start: 1052 Anesthesia Stop: 1150    Procedure: CYSTOLITHOLAPAXY WITH LASER (Bladder) Diagnosis:     Surgeons: Ricardo Schofield MD Provider: Christiana Segovia MD    Anesthesia Type: general ASA Status: 3          Anesthesia Type: general    Vitals  Vitals Value Taken Time   /48 05/03/23 1150   Temp 97 °F (36.1 °C) 05/03/23 1150   Pulse 80 05/03/23 1150   Resp 14 05/03/23 1150   SpO2 94 % 05/03/23 1150           Post Anesthesia Care and Evaluation    Patient location during evaluation: PACU  Patient participation: complete - patient participated  Level of consciousness: awake and alert  Pain score: 0  Pain management: adequate    Airway patency: patent  Anesthetic complications: No anesthetic complications  PONV Status: none  Cardiovascular status: hemodynamically stable and acceptable  Respiratory status: nonlabored ventilation, acceptable and nasal cannula  Hydration status: acceptable    Comments: Pt arrived to PACU with no issues during transport. Pt placed directly to monitors. Vital signs are within parameters. Pt maintaining ventilation spontaneously. No changes to dental. Report given to PACU and all question and concerns were addressed as well as the plan of care.

## 2023-05-09 LAB
CALCIUM OXALATE DIHYDRATE MFR STONE IR: 20 %
COLOR STONE: NORMAL
COM MFR STONE: 80 %
COMPN STONE: NORMAL
LABORATORY COMMENT REPORT: NORMAL
LABORATORY COMMENT REPORT: NORMAL
Lab: NORMAL
Lab: NORMAL
PHOTO: NORMAL
SIZE STONE: NORMAL MM
SPEC SOURCE SUBJ: NORMAL
WT STONE: 1865 MG

## 2023-09-04 ENCOUNTER — HOSPITAL ENCOUNTER (EMERGENCY)
Facility: HOSPITAL | Age: 88
Discharge: HOME | End: 2023-09-04
Attending: EMERGENCY MEDICINE

## 2023-09-04 VITALS
WEIGHT: 210 LBS | SYSTOLIC BLOOD PRESSURE: 137 MMHG | BODY MASS INDEX: 31.83 KG/M2 | HEART RATE: 85 BPM | OXYGEN SATURATION: 95 % | DIASTOLIC BLOOD PRESSURE: 72 MMHG | RESPIRATION RATE: 22 BRPM | TEMPERATURE: 100.1 F | HEIGHT: 68 IN

## 2023-09-04 DIAGNOSIS — U07.1 COVID-19: Primary | ICD-10-CM

## 2023-09-04 LAB
FLUAV AG NPH QL: NEGATIVE
FLUBV AG NPH QL: NEGATIVE
S PYO AG THROAT QL: NEGATIVE
SARS-COV-2 RDRP RESP QL NAA+PROBE: DETECTED

## 2023-09-04 PROCEDURE — 6370000000 HC RX 637 (ALT 250 FOR IP): Performed by: EMERGENCY MEDICINE

## 2023-09-04 PROCEDURE — 87804 INFLUENZA ASSAY W/OPTIC: CPT

## 2023-09-04 PROCEDURE — 87880 STREP A ASSAY W/OPTIC: CPT

## 2023-09-04 PROCEDURE — 87635 SARS-COV-2 COVID-19 AMP PRB: CPT

## 2023-09-04 PROCEDURE — 99283 EMERGENCY DEPT VISIT LOW MDM: CPT

## 2023-09-04 RX ORDER — ACETAMINOPHEN 500 MG
1000 TABLET ORAL
Status: COMPLETED | OUTPATIENT
Start: 2023-09-04 | End: 2023-09-04

## 2023-09-04 RX ADMIN — ACETAMINOPHEN 1000 MG: 500 TABLET ORAL at 15:20

## 2023-09-04 ASSESSMENT — PAIN - FUNCTIONAL ASSESSMENT: PAIN_FUNCTIONAL_ASSESSMENT: NONE - DENIES PAIN

## 2023-09-04 NOTE — ED NOTES
Due to positive Covid, I placed the patient and his son in the [facility] room. His room air sat was 96%. Per the son, his fever was 101.3 at home.  DW, RRT

## 2023-09-04 NOTE — ED TRIAGE NOTES
PT arrived to lobby with son, reports +COVID test earlier in day. Pt reports cough and sore throat. Denies any SOA, difficulty breathing, or pain. No distress noted.

## 2023-10-09 ENCOUNTER — HOSPITAL ENCOUNTER (EMERGENCY)
Facility: HOSPITAL | Age: 88
Discharge: HOME | End: 2023-10-09
Attending: FAMILY MEDICINE
Payer: OTHER GOVERNMENT

## 2023-10-09 VITALS
HEART RATE: 60 BPM | WEIGHT: 183 LBS | TEMPERATURE: 97.8 F | OXYGEN SATURATION: 99 % | BODY MASS INDEX: 27.74 KG/M2 | RESPIRATION RATE: 16 BRPM | SYSTOLIC BLOOD PRESSURE: 156 MMHG | HEIGHT: 68 IN | DIASTOLIC BLOOD PRESSURE: 70 MMHG

## 2023-10-09 DIAGNOSIS — Z87.442 H/O RENAL CALCULI: Primary | ICD-10-CM

## 2023-10-09 LAB
BILIRUB UR QL STRIP.AUTO: NEGATIVE
CLARITY UR: CLEAR
COLOR UR: YELLOW
EPI CELLS #/AREA URNS HPF: NORMAL /HPF (ref 0–5)
GLUCOSE UR STRIP.AUTO-MCNC: NEGATIVE MG/DL
HGB UR QL STRIP.AUTO: ABNORMAL
KETONES UR STRIP.AUTO-MCNC: NEGATIVE MG/DL
LEUKOCYTE ESTERASE UR QL STRIP.AUTO: NEGATIVE
NITRITE UR QL STRIP.AUTO: NEGATIVE
PH UR STRIP.AUTO: 7 [PH] (ref 5–8)
PROT UR STRIP.AUTO-MCNC: NEGATIVE MG/DL
RBC #/AREA URNS HPF: NORMAL /HPF (ref 0–4)
SP GR UR STRIP.AUTO: 1.01 (ref 1–1.03)
UA COMPLETE W REFLEX CULTURE PNL UR: ABNORMAL
UA DIPSTICK W REFLEX MICRO PNL UR: YES
URN SPEC COLLECT METH UR: ABNORMAL
UROBILINOGEN UR STRIP-ACNC: 0.2 E.U./DL
WBC #/AREA URNS HPF: NORMAL /HPF (ref 0–5)

## 2023-10-09 PROCEDURE — 99283 EMERGENCY DEPT VISIT LOW MDM: CPT

## 2023-10-09 PROCEDURE — 81001 URINALYSIS AUTO W/SCOPE: CPT

## 2023-10-09 RX ORDER — AMLODIPINE BESYLATE 5 MG/1
5 TABLET ORAL NIGHTLY
COMMUNITY

## 2023-10-09 RX ORDER — LOSARTAN POTASSIUM 25 MG/1
25 TABLET ORAL DAILY
COMMUNITY

## 2023-10-09 RX ORDER — PANTOPRAZOLE SODIUM 40 MG/1
40 TABLET, DELAYED RELEASE ORAL DAILY
COMMUNITY

## 2023-10-09 RX ORDER — FINASTERIDE 5 MG/1
5 TABLET, FILM COATED ORAL NIGHTLY
COMMUNITY

## 2023-10-09 RX ORDER — M-VIT,TX,IRON,MINS/CALC/FOLIC 27MG-0.4MG
1 TABLET ORAL DAILY
COMMUNITY

## 2023-10-09 ASSESSMENT — PATIENT HEALTH QUESTIONNAIRE - PHQ9
SUM OF ALL RESPONSES TO PHQ QUESTIONS 1-9: 0
1. LITTLE INTEREST OR PLEASURE IN DOING THINGS: 0
SUM OF ALL RESPONSES TO PHQ QUESTIONS 1-9: 0
SUM OF ALL RESPONSES TO PHQ QUESTIONS 1-9: 0
SUM OF ALL RESPONSES TO PHQ9 QUESTIONS 1 & 2: 0
2. FEELING DOWN, DEPRESSED OR HOPELESS: 0
SUM OF ALL RESPONSES TO PHQ QUESTIONS 1-9: 0

## 2023-10-09 ASSESSMENT — LIFESTYLE VARIABLES
HOW OFTEN DO YOU HAVE A DRINK CONTAINING ALCOHOL: NEVER
HOW MANY STANDARD DRINKS CONTAINING ALCOHOL DO YOU HAVE ON A TYPICAL DAY: PATIENT DOES NOT DRINK

## 2023-10-09 ASSESSMENT — PAIN - FUNCTIONAL ASSESSMENT: PAIN_FUNCTIONAL_ASSESSMENT: NONE - DENIES PAIN

## 2023-10-09 NOTE — ED NOTES
Patient states that he had a clot in his urine yesterday, patient denies any pain at this time and states that his urine looks clear today.      Althea Price RN  10/09/23 [id number]

## 2023-10-09 NOTE — ED NOTES
Dc instructions given to patient at this time, patient to return for further problems or concerns.      Samira Tapia RN  10/09/23 [id number]

## 2023-10-11 NOTE — CARE COORDINATION
Placed call to patient urology regarding recent ER visit. Patient has an appointment with  on 10/13/23 at 10:30. Placed call to patient regarding this, patient number is D/C. Placed call to patient daughter regarding this, no answer left voicemail. Will sent letter to patient as well.

## (undated) DEVICE — EVAC BLDR UROVAC W ADAPT

## (undated) DEVICE — GLV SURG SENSICARE PI ORTHO PF SZ7 LF STRL

## (undated) DEVICE — THE BITE BLOCK MAXI, LATEX FREE STRAP IS USED TO PROTECT THE ENDOSCOPE INSERTION TUBE FROM BEING BITTEN BY THE PATIENT.

## (undated) DEVICE — SOLIDIFIER LIQ PREMISORB 1500CC

## (undated) DEVICE — SOL IRR H2O BTL 1000ML STRL

## (undated) DEVICE — CONTN GRAD MEAS TRIANG 32OZ BLK

## (undated) DEVICE — INTRO ACCSR BLNT TP

## (undated) DEVICE — SYR LUERLOK 50ML

## (undated) DEVICE — PK CYSTO-TUR BASIC 10

## (undated) DEVICE — TUBING, SUCTION, 1/4" X 10', STRAIGHT: Brand: MEDLINE

## (undated) DEVICE — HYBRID CO2 TUBING/CAP SET FOR OLYMPUS® SCOPES & CO2 SOURCE: Brand: ERBE

## (undated) DEVICE — LUBE JELLY FOIL PACK 1.4 OZ: Brand: MEDLINE INDUSTRIES, INC.

## (undated) DEVICE — BLANKT WARM UPPR/BDY ARM/OUT 57X196CM

## (undated) DEVICE — ADAPT CLN LUM OLYMP AIR/H20

## (undated) DEVICE — ST LINER SAFECAP GRN RED CP STRL

## (undated) DEVICE — KT ORCA ORCAPOD DISP STRL

## (undated) DEVICE — FIRST STEP BEDSIDE ADD WATER KIT - RESEALABLE STAND-UP POUCH, ENDOSCOPIC CLEANING PAD - 1 POUCH: Brand: FIRST STEP BEDSIDE ADD WATER KIT - RESEALABLE STAND-UP POUCH, ENDOSCOPIC CLEANIN